# Patient Record
Sex: FEMALE | Race: WHITE | NOT HISPANIC OR LATINO
[De-identification: names, ages, dates, MRNs, and addresses within clinical notes are randomized per-mention and may not be internally consistent; named-entity substitution may affect disease eponyms.]

---

## 2017-01-10 ENCOUNTER — APPOINTMENT (OUTPATIENT)
Dept: OBGYN | Facility: CLINIC | Age: 37
End: 2017-01-10

## 2017-02-13 ENCOUNTER — RESULT REVIEW (OUTPATIENT)
Age: 37
End: 2017-02-13

## 2017-02-14 ENCOUNTER — APPOINTMENT (OUTPATIENT)
Dept: OBGYN | Facility: CLINIC | Age: 37
End: 2017-02-14

## 2017-05-31 ENCOUNTER — APPOINTMENT (OUTPATIENT)
Dept: OBGYN | Facility: CLINIC | Age: 37
End: 2017-05-31

## 2017-06-22 ENCOUNTER — APPOINTMENT (OUTPATIENT)
Dept: OBGYN | Facility: CLINIC | Age: 37
End: 2017-06-22

## 2017-07-10 ENCOUNTER — APPOINTMENT (OUTPATIENT)
Dept: OBGYN | Facility: CLINIC | Age: 37
End: 2017-07-10

## 2017-08-09 ENCOUNTER — APPOINTMENT (OUTPATIENT)
Dept: OBGYN | Facility: CLINIC | Age: 37
End: 2017-08-09
Payer: COMMERCIAL

## 2017-08-09 PROCEDURE — 36415 COLL VENOUS BLD VENIPUNCTURE: CPT

## 2017-08-09 PROCEDURE — 0502F SUBSEQUENT PRENATAL CARE: CPT

## 2017-08-31 ENCOUNTER — ASOB RESULT (OUTPATIENT)
Age: 37
End: 2017-08-31

## 2017-08-31 ENCOUNTER — APPOINTMENT (OUTPATIENT)
Dept: ANTEPARTUM | Facility: CLINIC | Age: 37
End: 2017-08-31
Payer: COMMERCIAL

## 2017-08-31 PROCEDURE — 76811 OB US DETAILED SNGL FETUS: CPT

## 2017-09-07 ENCOUNTER — APPOINTMENT (OUTPATIENT)
Dept: OBGYN | Facility: CLINIC | Age: 37
End: 2017-09-07

## 2017-09-11 ENCOUNTER — APPOINTMENT (OUTPATIENT)
Dept: OBGYN | Facility: CLINIC | Age: 37
End: 2017-09-11
Payer: COMMERCIAL

## 2017-09-11 PROCEDURE — 0502F SUBSEQUENT PRENATAL CARE: CPT

## 2017-10-03 ENCOUNTER — APPOINTMENT (OUTPATIENT)
Dept: OBGYN | Facility: CLINIC | Age: 37
End: 2017-10-03
Payer: COMMERCIAL

## 2017-10-03 PROCEDURE — 0502F SUBSEQUENT PRENATAL CARE: CPT

## 2017-10-25 ENCOUNTER — APPOINTMENT (OUTPATIENT)
Dept: OBGYN | Facility: CLINIC | Age: 37
End: 2017-10-25
Payer: COMMERCIAL

## 2017-10-25 PROCEDURE — 0502F SUBSEQUENT PRENATAL CARE: CPT

## 2017-10-25 PROCEDURE — 76817 TRANSVAGINAL US OBSTETRIC: CPT

## 2017-10-25 PROCEDURE — 36415 COLL VENOUS BLD VENIPUNCTURE: CPT

## 2017-10-25 PROCEDURE — 76816 OB US FOLLOW-UP PER FETUS: CPT

## 2017-10-30 ENCOUNTER — INPATIENT (INPATIENT)
Facility: HOSPITAL | Age: 37
LOS: 3 days | Discharge: HOME | DRG: 782 | End: 2017-11-03
Attending: OBSTETRICS & GYNECOLOGY | Admitting: OBSTETRICS & GYNECOLOGY
Payer: COMMERCIAL

## 2017-10-30 DIAGNOSIS — Z3A.00 WEEKS OF GESTATION OF PREGNANCY NOT SPECIFIED: ICD-10-CM

## 2017-10-30 DIAGNOSIS — O26.899 OTHER SPECIFIED PREGNANCY RELATED CONDITIONS, UNSPECIFIED TRIMESTER: ICD-10-CM

## 2017-10-30 DIAGNOSIS — Z34.80 ENCOUNTER FOR SUPERVISION OF OTHER NORMAL PREGNANCY, UNSPECIFIED TRIMESTER: ICD-10-CM

## 2017-10-31 ENCOUNTER — APPOINTMENT (OUTPATIENT)
Dept: ANTEPARTUM | Facility: CLINIC | Age: 37
End: 2017-10-31

## 2017-10-31 ENCOUNTER — ASOB RESULT (OUTPATIENT)
Age: 37
End: 2017-10-31

## 2017-10-31 VITALS — WEIGHT: 171.96 LBS | HEIGHT: 64.5 IN

## 2017-10-31 LAB
APPEARANCE UR: CLEAR — SIGNIFICANT CHANGE UP
APTT BLD: 27.5 SEC — SIGNIFICANT CHANGE UP (ref 27.5–37.4)
BACTERIA # UR AUTO: ABNORMAL /HPF
BASOPHILS # BLD AUTO: 0 K/UL — SIGNIFICANT CHANGE UP (ref 0–0.2)
BASOPHILS NFR BLD AUTO: 0.4 % — SIGNIFICANT CHANGE UP (ref 0–2)
BILIRUB UR-MCNC: NEGATIVE — SIGNIFICANT CHANGE UP
BLD GP AB SCN SERPL QL: NEGATIVE — SIGNIFICANT CHANGE UP
COLOR SPEC: SIGNIFICANT CHANGE UP
DIFF PNL FLD: ABNORMAL
EOSINOPHIL # BLD AUTO: 0 K/UL — SIGNIFICANT CHANGE UP (ref 0–0.5)
EOSINOPHIL NFR BLD AUTO: 0.4 % — SIGNIFICANT CHANGE UP (ref 0–6)
EPI CELLS # UR: SIGNIFICANT CHANGE UP /HPF
FIBRINOGEN PPP-MCNC: 466 MG/DL — SIGNIFICANT CHANGE UP (ref 310–510)
GLUCOSE UR QL: NEGATIVE — SIGNIFICANT CHANGE UP
HCT VFR BLD CALC: 33.9 % — LOW (ref 34.5–45)
HGB BLD-MCNC: 12.2 G/DL — SIGNIFICANT CHANGE UP (ref 11.5–15.5)
INR BLD: 0.93 RATIO — SIGNIFICANT CHANGE UP (ref 0.88–1.16)
KETONES UR-MCNC: NEGATIVE — SIGNIFICANT CHANGE UP
KLEIHAUER-BETKE CALCULATION: 0.1 % — SIGNIFICANT CHANGE UP (ref 0–0.3)
LEUKOCYTE ESTERASE UR-ACNC: NEGATIVE — SIGNIFICANT CHANGE UP
LYMPHOCYTES # BLD AUTO: 2.9 K/UL — SIGNIFICANT CHANGE UP (ref 1–3.3)
LYMPHOCYTES # BLD AUTO: 25.8 % — SIGNIFICANT CHANGE UP (ref 13–44)
MAGNESIUM SERPL-MCNC: 5.4 MG/DL — HIGH (ref 1.6–2.6)
MAGNESIUM SERPL-MCNC: 6 MG/DL — HIGH (ref 1.6–2.6)
MAGNESIUM SERPL-MCNC: 6.1 MG/DL — HIGH (ref 1.6–2.6)
MCHC RBC-ENTMCNC: 34.6 PG — HIGH (ref 27–34)
MCHC RBC-ENTMCNC: 36.1 GM/DL — HIGH (ref 32–36)
MCV RBC AUTO: 95.7 FL — SIGNIFICANT CHANGE UP (ref 80–100)
MONOCYTES # BLD AUTO: 0.9 K/UL — SIGNIFICANT CHANGE UP (ref 0–0.9)
MONOCYTES NFR BLD AUTO: 7.6 % — SIGNIFICANT CHANGE UP (ref 2–14)
NEUTROPHILS # BLD AUTO: 7.5 K/UL — HIGH (ref 1.8–7.4)
NEUTROPHILS NFR BLD AUTO: 65.9 % — SIGNIFICANT CHANGE UP (ref 43–77)
NITRITE UR-MCNC: NEGATIVE — SIGNIFICANT CHANGE UP
PH UR: 6.5 — SIGNIFICANT CHANGE UP (ref 5–8)
PLATELET # BLD AUTO: 197 K/UL — SIGNIFICANT CHANGE UP (ref 150–400)
PROT UR-MCNC: NEGATIVE — SIGNIFICANT CHANGE UP
PROTHROM AB SERPL-ACNC: 10.1 SEC — SIGNIFICANT CHANGE UP (ref 9.8–12.7)
RBC # BLD: 3.54 M/UL — LOW (ref 3.8–5.2)
RBC # FLD: 11.9 % — SIGNIFICANT CHANGE UP (ref 10.3–14.5)
RBC CASTS # UR COMP ASSIST: >50 /HPF (ref 0–2)
RH IG SCN BLD-IMP: POSITIVE — SIGNIFICANT CHANGE UP
SP GR SPEC: 1.01 — LOW (ref 1.01–1.02)
T PALLIDUM AB TITR SER: NEGATIVE — SIGNIFICANT CHANGE UP
UROBILINOGEN FLD QL: NEGATIVE — SIGNIFICANT CHANGE UP
WBC # BLD: 11.3 K/UL — HIGH (ref 3.8–10.5)
WBC # FLD AUTO: 11.3 K/UL — HIGH (ref 3.8–10.5)
WBC UR QL: SIGNIFICANT CHANGE UP /HPF (ref 0–5)

## 2017-10-31 PROCEDURE — 76816 OB US FOLLOW-UP PER FETUS: CPT | Mod: 26

## 2017-10-31 PROCEDURE — 76817 TRANSVAGINAL US OBSTETRIC: CPT | Mod: 26

## 2017-10-31 RX ORDER — AMPICILLIN TRIHYDRATE 250 MG
CAPSULE ORAL
Qty: 0 | Refills: 0 | Status: DISCONTINUED | OUTPATIENT
Start: 2017-10-31 | End: 2017-10-31

## 2017-10-31 RX ORDER — AMPICILLIN TRIHYDRATE 250 MG
2 CAPSULE ORAL ONCE
Qty: 0 | Refills: 0 | Status: COMPLETED | OUTPATIENT
Start: 2017-10-31 | End: 2017-10-31

## 2017-10-31 RX ORDER — MAGNESIUM SULFATE 500 MG/ML
4 VIAL (ML) INJECTION ONCE
Qty: 0 | Refills: 0 | Status: COMPLETED | OUTPATIENT
Start: 2017-10-30 | End: 2017-10-31

## 2017-10-31 RX ORDER — MAGNESIUM SULFATE 500 MG/ML
2.5 VIAL (ML) INJECTION
Qty: 40 | Refills: 0 | Status: DISCONTINUED | OUTPATIENT
Start: 2017-10-30 | End: 2017-11-01

## 2017-10-31 RX ORDER — INFLUENZA VIRUS VACCINE 15; 15; 15; 15 UG/.5ML; UG/.5ML; UG/.5ML; UG/.5ML
0.5 SUSPENSION INTRAMUSCULAR ONCE
Qty: 0 | Refills: 0 | Status: COMPLETED | OUTPATIENT
Start: 2017-10-31 | End: 2017-11-03

## 2017-10-31 RX ORDER — INDOMETHACIN 50 MG
50 CAPSULE ORAL ONCE
Qty: 0 | Refills: 0 | Status: COMPLETED | OUTPATIENT
Start: 2017-10-31 | End: 2017-10-31

## 2017-10-31 RX ORDER — SODIUM CHLORIDE 9 MG/ML
1000 INJECTION, SOLUTION INTRAVENOUS
Qty: 0 | Refills: 0 | Status: DISCONTINUED | OUTPATIENT
Start: 2017-10-30 | End: 2017-11-01

## 2017-10-31 RX ORDER — INDOMETHACIN 50 MG
25 CAPSULE ORAL EVERY 6 HOURS
Qty: 0 | Refills: 0 | Status: COMPLETED | OUTPATIENT
Start: 2017-10-31 | End: 2017-10-31

## 2017-10-31 RX ORDER — INDOMETHACIN 50 MG
25 CAPSULE ORAL EVERY 6 HOURS
Qty: 0 | Refills: 0 | Status: COMPLETED | OUTPATIENT
Start: 2017-10-31 | End: 2017-11-01

## 2017-10-31 RX ORDER — AMPICILLIN TRIHYDRATE 250 MG
1 CAPSULE ORAL EVERY 4 HOURS
Qty: 0 | Refills: 0 | Status: DISCONTINUED | OUTPATIENT
Start: 2017-10-31 | End: 2017-10-31

## 2017-10-31 RX ADMIN — Medication 108 GRAM(S): at 10:51

## 2017-10-31 RX ADMIN — Medication 25 MILLIGRAM(S): at 09:05

## 2017-10-31 RX ADMIN — SODIUM CHLORIDE 125 MILLILITER(S): 9 INJECTION, SOLUTION INTRAVENOUS at 06:33

## 2017-10-31 RX ADMIN — Medication 300 GRAM(S): at 00:25

## 2017-10-31 RX ADMIN — Medication 25 MILLIGRAM(S): at 14:58

## 2017-10-31 RX ADMIN — Medication 50 MILLIGRAM(S): at 03:06

## 2017-10-31 RX ADMIN — Medication 12 MILLIGRAM(S): at 00:18

## 2017-10-31 RX ADMIN — Medication 108 GRAM(S): at 06:37

## 2017-10-31 RX ADMIN — Medication 50 GM/HR: at 00:52

## 2017-10-31 RX ADMIN — Medication 25 MILLIGRAM(S): at 21:11

## 2017-10-31 RX ADMIN — Medication 116 GRAM(S): at 02:16

## 2017-10-31 RX ADMIN — Medication 108 GRAM(S): at 14:42

## 2017-11-01 LAB
GROUP B BETA STREP DNA (PCR): SIGNIFICANT CHANGE UP
GROUP B BETA STREP INTERPRETATION: SIGNIFICANT CHANGE UP
SOURCE GROUP B STREP: SIGNIFICANT CHANGE UP

## 2017-11-01 PROCEDURE — 99232 SBSQ HOSP IP/OBS MODERATE 35: CPT

## 2017-11-01 PROCEDURE — 59025 FETAL NON-STRESS TEST: CPT | Mod: 26

## 2017-11-01 RX ADMIN — Medication 25 MILLIGRAM(S): at 15:18

## 2017-11-01 RX ADMIN — Medication 25 MILLIGRAM(S): at 03:09

## 2017-11-01 RX ADMIN — Medication 1 TABLET(S): at 13:01

## 2017-11-01 RX ADMIN — Medication 12 MILLIGRAM(S): at 00:05

## 2017-11-01 RX ADMIN — Medication 25 MILLIGRAM(S): at 09:08

## 2017-11-02 ENCOUNTER — APPOINTMENT (OUTPATIENT)
Dept: OBGYN | Facility: CLINIC | Age: 37
End: 2017-11-02

## 2017-11-02 PROCEDURE — 59025 FETAL NON-STRESS TEST: CPT | Mod: 26

## 2017-11-02 RX ADMIN — Medication 1 TABLET(S): at 12:09

## 2017-11-03 ENCOUNTER — TRANSCRIPTION ENCOUNTER (OUTPATIENT)
Age: 37
End: 2017-11-03

## 2017-11-03 VITALS
HEART RATE: 82 BPM | SYSTOLIC BLOOD PRESSURE: 95 MMHG | TEMPERATURE: 98 F | RESPIRATION RATE: 18 BRPM | DIASTOLIC BLOOD PRESSURE: 58 MMHG

## 2017-11-03 PROCEDURE — 59025 FETAL NON-STRESS TEST: CPT

## 2017-11-03 PROCEDURE — 90686 IIV4 VACC NO PRSV 0.5 ML IM: CPT

## 2017-11-03 PROCEDURE — 85384 FIBRINOGEN ACTIVITY: CPT

## 2017-11-03 PROCEDURE — 85460 HEMOGLOBIN FETAL: CPT

## 2017-11-03 PROCEDURE — 86780 TREPONEMA PALLIDUM: CPT

## 2017-11-03 PROCEDURE — 85610 PROTHROMBIN TIME: CPT

## 2017-11-03 PROCEDURE — 85027 COMPLETE CBC AUTOMATED: CPT

## 2017-11-03 PROCEDURE — 86901 BLOOD TYPING SEROLOGIC RH(D): CPT

## 2017-11-03 PROCEDURE — G0463: CPT

## 2017-11-03 PROCEDURE — 85730 THROMBOPLASTIN TIME PARTIAL: CPT

## 2017-11-03 PROCEDURE — 86850 RBC ANTIBODY SCREEN: CPT

## 2017-11-03 PROCEDURE — 87653 STREP B DNA AMP PROBE: CPT

## 2017-11-03 PROCEDURE — 59050 FETAL MONITOR W/REPORT: CPT

## 2017-11-03 PROCEDURE — 83735 ASSAY OF MAGNESIUM: CPT

## 2017-11-03 PROCEDURE — 86900 BLOOD TYPING SEROLOGIC ABO: CPT

## 2017-11-03 PROCEDURE — 81001 URINALYSIS AUTO W/SCOPE: CPT

## 2017-11-03 RX ADMIN — Medication 1 TABLET(S): at 11:24

## 2017-11-03 RX ADMIN — INFLUENZA VIRUS VACCINE 0.5 MILLILITER(S): 15; 15; 15; 15 SUSPENSION INTRAMUSCULAR at 11:21

## 2017-11-03 NOTE — DISCHARGE NOTE ANTEPARTUM - MEDICATION SUMMARY - MEDICATIONS TO STOP TAKING
I will STOP taking the medications listed below when I get home from the hospital:    ibuprofen 600 mg oral tablet  -- 1 tab(s) by mouth every 6 hours, As needed, Mild-to-moderate pain or cramping

## 2017-11-03 NOTE — DISCHARGE NOTE ANTEPARTUM - PATIENT PORTAL LINK FT
“You can access the [employer] Patient Portal, offered by [facility], by registering with the following website: [email or web address]”

## 2017-11-03 NOTE — DISCHARGE NOTE ANTEPARTUM - CARE PLAN
Principal Discharge DX:	Vaginal bleeding in pregnancy, third trimester  Goal:	continue pregnancy  Instructions for follow-up, activity and diet:	no lifting or exercise, nothing in the vagina, do not return to work, reduced activity  Secondary Diagnosis:	Low lying placenta with hemorrhage, third trimester

## 2017-11-03 NOTE — DISCHARGE NOTE ANTEPARTUM - CARE PROVIDER_API CALL
Brandy Marquez (MD), OBSGYN  [address] [address]  Phone: [phone]  Fax: [phone]

## 2017-11-03 NOTE — DISCHARGE NOTE ANTEPARTUM - HOSPITAL COURSE
35yo P1 admitted @ 28+ weeks with vaginal bleeding, has known low lying placenta, pt found to be unique and treated with magnesium sulfate and indocin tocolysis, received betamethasone course.  Bleeding subsided and patient discharged home on modified bed rest

## 2017-11-03 NOTE — DISCHARGE NOTE ANTEPARTUM - MEDICATION SUMMARY - MEDICATIONS TO TAKE
I will START or STAY ON the medications listed below when I get home from the hospital:    Prenatal Multivitamins with Folic Acid 1 mg oral tablet  -- 1 tab(s) by mouth once a day  -- Indication: For nutrition    docusate sodium 100 mg oral capsule  -- 1 cap(s) by mouth 2 times a day, As needed, Stool Softening  -- Indication: For Stool softener

## 2017-11-03 NOTE — PROVIDER CONTACT NOTE (OTHER) - ASSESSMENT
Pt denies cramping ctx or feeling faint, verbalizes positive fetal movement, abdomen soft on palpation.  Spotting noted on underwear, Patient placed on NST, Vitals temp 98.2, HR 82, BP 95/58 Resp 18, No apparent signs of distress noted

## 2017-11-03 NOTE — DISCHARGE NOTE ANTEPARTUM - PLAN OF CARE
continue pregnancy no lifting or exercise, nothing in the vagina, do not return to work, reduced activity

## 2017-11-06 ENCOUNTER — APPOINTMENT (OUTPATIENT)
Dept: OBGYN | Facility: CLINIC | Age: 37
End: 2017-11-06
Payer: COMMERCIAL

## 2017-11-06 PROCEDURE — 0502F SUBSEQUENT PRENATAL CARE: CPT

## 2017-11-08 ENCOUNTER — APPOINTMENT (OUTPATIENT)
Dept: OBGYN | Facility: CLINIC | Age: 37
End: 2017-11-08

## 2017-11-14 ENCOUNTER — APPOINTMENT (OUTPATIENT)
Dept: OBGYN | Facility: CLINIC | Age: 37
End: 2017-11-14
Payer: COMMERCIAL

## 2017-11-14 PROCEDURE — 76815 OB US LIMITED FETUS(S): CPT

## 2017-11-14 PROCEDURE — 76817 TRANSVAGINAL US OBSTETRIC: CPT

## 2017-11-21 ENCOUNTER — APPOINTMENT (OUTPATIENT)
Dept: OBGYN | Facility: CLINIC | Age: 37
End: 2017-11-21
Payer: COMMERCIAL

## 2017-11-21 PROCEDURE — 0502F SUBSEQUENT PRENATAL CARE: CPT

## 2017-11-30 ENCOUNTER — APPOINTMENT (OUTPATIENT)
Dept: OBGYN | Facility: CLINIC | Age: 37
End: 2017-11-30
Payer: COMMERCIAL

## 2017-11-30 PROCEDURE — 0502F SUBSEQUENT PRENATAL CARE: CPT

## 2017-12-04 ENCOUNTER — APPOINTMENT (OUTPATIENT)
Dept: OBGYN | Facility: CLINIC | Age: 37
End: 2017-12-04
Payer: COMMERCIAL

## 2017-12-04 ENCOUNTER — OUTPATIENT (OUTPATIENT)
Dept: OUTPATIENT SERVICES | Facility: HOSPITAL | Age: 37
LOS: 1 days | End: 2017-12-04
Payer: COMMERCIAL

## 2017-12-04 DIAGNOSIS — Z3A.00 WEEKS OF GESTATION OF PREGNANCY NOT SPECIFIED: ICD-10-CM

## 2017-12-04 DIAGNOSIS — O26.899 OTHER SPECIFIED PREGNANCY RELATED CONDITIONS, UNSPECIFIED TRIMESTER: ICD-10-CM

## 2017-12-04 PROCEDURE — 59025 FETAL NON-STRESS TEST: CPT

## 2017-12-04 PROCEDURE — 76819 FETAL BIOPHYS PROFIL W/O NST: CPT

## 2017-12-04 PROCEDURE — 0502F SUBSEQUENT PRENATAL CARE: CPT

## 2017-12-04 PROCEDURE — 76816 OB US FOLLOW-UP PER FETUS: CPT

## 2017-12-04 PROCEDURE — G0463: CPT

## 2017-12-14 ENCOUNTER — APPOINTMENT (OUTPATIENT)
Dept: OBGYN | Facility: CLINIC | Age: 37
End: 2017-12-14
Payer: COMMERCIAL

## 2017-12-14 PROCEDURE — 76817 TRANSVAGINAL US OBSTETRIC: CPT

## 2017-12-14 PROCEDURE — 76818 FETAL BIOPHYS PROFILE W/NST: CPT

## 2017-12-14 PROCEDURE — 0502F SUBSEQUENT PRENATAL CARE: CPT

## 2017-12-18 ENCOUNTER — APPOINTMENT (OUTPATIENT)
Dept: OBGYN | Facility: CLINIC | Age: 37
End: 2017-12-18
Payer: COMMERCIAL

## 2017-12-18 PROCEDURE — 0502F SUBSEQUENT PRENATAL CARE: CPT

## 2017-12-18 PROCEDURE — 76819 FETAL BIOPHYS PROFIL W/O NST: CPT

## 2017-12-26 ENCOUNTER — APPOINTMENT (OUTPATIENT)
Dept: OBGYN | Facility: CLINIC | Age: 37
End: 2017-12-26
Payer: COMMERCIAL

## 2017-12-26 PROCEDURE — 0502F SUBSEQUENT PRENATAL CARE: CPT

## 2018-01-03 ENCOUNTER — APPOINTMENT (OUTPATIENT)
Dept: OBGYN | Facility: CLINIC | Age: 38
End: 2018-01-03
Payer: COMMERCIAL

## 2018-01-03 PROCEDURE — 0502F SUBSEQUENT PRENATAL CARE: CPT

## 2018-01-03 PROCEDURE — 59025 FETAL NON-STRESS TEST: CPT

## 2018-01-08 ENCOUNTER — ASOB RESULT (OUTPATIENT)
Age: 38
End: 2018-01-08

## 2018-01-08 ENCOUNTER — APPOINTMENT (OUTPATIENT)
Dept: ANTEPARTUM | Facility: CLINIC | Age: 38
End: 2018-01-08
Payer: COMMERCIAL

## 2018-01-08 ENCOUNTER — OUTPATIENT (OUTPATIENT)
Dept: OUTPATIENT SERVICES | Facility: HOSPITAL | Age: 38
LOS: 1 days | End: 2018-01-08
Payer: COMMERCIAL

## 2018-01-08 DIAGNOSIS — Z01.818 ENCOUNTER FOR OTHER PREPROCEDURAL EXAMINATION: ICD-10-CM

## 2018-01-08 PROCEDURE — 59412 ANTEPARTUM MANIPULATION: CPT

## 2018-01-08 PROCEDURE — 76805 OB US >/= 14 WKS SNGL FETUS: CPT

## 2018-01-08 PROCEDURE — 76818 FETAL BIOPHYS PROFILE W/NST: CPT

## 2018-01-08 PROCEDURE — 76818 FETAL BIOPHYS PROFILE W/NST: CPT | Mod: 26

## 2018-01-08 PROCEDURE — 76805 OB US >/= 14 WKS SNGL FETUS: CPT | Mod: 26

## 2018-01-09 ENCOUNTER — RESULT REVIEW (OUTPATIENT)
Age: 38
End: 2018-01-09

## 2018-01-09 ENCOUNTER — INPATIENT (INPATIENT)
Facility: HOSPITAL | Age: 38
LOS: 2 days | Discharge: HOME | End: 2018-01-12
Attending: OBSTETRICS & GYNECOLOGY | Admitting: OBSTETRICS & GYNECOLOGY
Payer: COMMERCIAL

## 2018-01-09 VITALS — HEIGHT: 64 IN | WEIGHT: 178.57 LBS

## 2018-01-09 DIAGNOSIS — O44.10 COMPLETE PLACENTA PREVIA WITH HEMORRHAGE, UNSPECIFIED TRIMESTER: ICD-10-CM

## 2018-01-09 DIAGNOSIS — O46.93 ANTEPARTUM HEMORRHAGE, UNSPECIFIED, THIRD TRIMESTER: ICD-10-CM

## 2018-01-09 DIAGNOSIS — O44.1: ICD-10-CM

## 2018-01-09 LAB
BASOPHILS # BLD AUTO: 0.1 K/UL — SIGNIFICANT CHANGE UP (ref 0–0.2)
BASOPHILS NFR BLD AUTO: 0.8 % — SIGNIFICANT CHANGE UP (ref 0–2)
BLD GP AB SCN SERPL QL: NEGATIVE — SIGNIFICANT CHANGE UP
EOSINOPHIL # BLD AUTO: 0 K/UL — SIGNIFICANT CHANGE UP (ref 0–0.5)
EOSINOPHIL NFR BLD AUTO: 0.4 % — SIGNIFICANT CHANGE UP (ref 0–6)
HCT VFR BLD CALC: 35.6 % — SIGNIFICANT CHANGE UP (ref 34.5–45)
HGB BLD-MCNC: 12.2 G/DL — SIGNIFICANT CHANGE UP (ref 11.5–15.5)
LYMPHOCYTES # BLD AUTO: 1.8 K/UL — SIGNIFICANT CHANGE UP (ref 1–3.3)
LYMPHOCYTES # BLD AUTO: 21.5 % — SIGNIFICANT CHANGE UP (ref 13–44)
MCHC RBC-ENTMCNC: 32.9 PG — SIGNIFICANT CHANGE UP (ref 27–34)
MCHC RBC-ENTMCNC: 34.2 GM/DL — SIGNIFICANT CHANGE UP (ref 32–36)
MCV RBC AUTO: 96.1 FL — SIGNIFICANT CHANGE UP (ref 80–100)
MONOCYTES # BLD AUTO: 0.7 K/UL — SIGNIFICANT CHANGE UP (ref 0–0.9)
MONOCYTES NFR BLD AUTO: 8.2 % — SIGNIFICANT CHANGE UP (ref 2–14)
NEUTROPHILS # BLD AUTO: 5.7 K/UL — SIGNIFICANT CHANGE UP (ref 1.8–7.4)
NEUTROPHILS NFR BLD AUTO: 69.1 % — SIGNIFICANT CHANGE UP (ref 43–77)
PLATELET # BLD AUTO: 164 K/UL — SIGNIFICANT CHANGE UP (ref 150–400)
RBC # BLD: 3.71 M/UL — LOW (ref 3.8–5.2)
RBC # FLD: 11.7 % — SIGNIFICANT CHANGE UP (ref 10.3–14.5)
RH IG SCN BLD-IMP: POSITIVE — SIGNIFICANT CHANGE UP
T PALLIDUM AB TITR SER: NEGATIVE — SIGNIFICANT CHANGE UP
WBC # BLD: 8.2 K/UL — SIGNIFICANT CHANGE UP (ref 3.8–10.5)
WBC # FLD AUTO: 8.2 K/UL — SIGNIFICANT CHANGE UP (ref 3.8–10.5)

## 2018-01-09 PROCEDURE — 59510 CESAREAN DELIVERY: CPT

## 2018-01-09 RX ORDER — SODIUM CHLORIDE 9 MG/ML
1000 INJECTION, SOLUTION INTRAVENOUS ONCE
Qty: 0 | Refills: 0 | Status: COMPLETED | OUTPATIENT
Start: 2018-01-09 | End: 2018-01-09

## 2018-01-09 RX ORDER — NALOXONE HYDROCHLORIDE 4 MG/.1ML
0.1 SPRAY NASAL
Qty: 0 | Refills: 0 | Status: DISCONTINUED | OUTPATIENT
Start: 2018-01-09 | End: 2018-01-11

## 2018-01-09 RX ORDER — IBUPROFEN 200 MG
600 TABLET ORAL EVERY 6 HOURS
Qty: 0 | Refills: 0 | Status: COMPLETED | OUTPATIENT
Start: 2018-01-09 | End: 2018-12-08

## 2018-01-09 RX ORDER — METOCLOPRAMIDE HCL 10 MG
10 TABLET ORAL ONCE
Qty: 0 | Refills: 0 | Status: COMPLETED | OUTPATIENT
Start: 2018-01-09 | End: 2018-01-10

## 2018-01-09 RX ORDER — ACETAMINOPHEN 500 MG
975 TABLET ORAL EVERY 6 HOURS
Qty: 0 | Refills: 0 | Status: DISCONTINUED | OUTPATIENT
Start: 2018-01-09 | End: 2018-01-12

## 2018-01-09 RX ORDER — SODIUM CHLORIDE 9 MG/ML
1000 INJECTION, SOLUTION INTRAVENOUS
Qty: 0 | Refills: 0 | Status: DISCONTINUED | OUTPATIENT
Start: 2018-01-09 | End: 2018-01-12

## 2018-01-09 RX ORDER — HEPARIN SODIUM 5000 [USP'U]/ML
5000 INJECTION INTRAVENOUS; SUBCUTANEOUS EVERY 12 HOURS
Qty: 0 | Refills: 0 | Status: DISCONTINUED | OUTPATIENT
Start: 2018-01-09 | End: 2018-01-12

## 2018-01-09 RX ORDER — SODIUM CHLORIDE 9 MG/ML
1000 INJECTION, SOLUTION INTRAVENOUS
Qty: 0 | Refills: 0 | Status: DISCONTINUED | OUTPATIENT
Start: 2018-01-09 | End: 2018-01-09

## 2018-01-09 RX ORDER — LANOLIN
1 OINTMENT (GRAM) TOPICAL
Qty: 0 | Refills: 0 | Status: DISCONTINUED | OUTPATIENT
Start: 2018-01-09 | End: 2018-01-12

## 2018-01-09 RX ORDER — FAMOTIDINE 10 MG/ML
20 INJECTION INTRAVENOUS ONCE
Qty: 0 | Refills: 0 | Status: COMPLETED | OUTPATIENT
Start: 2018-01-09 | End: 2018-01-09

## 2018-01-09 RX ORDER — OXYTOCIN 10 UNIT/ML
41.67 VIAL (ML) INJECTION
Qty: 20 | Refills: 0 | Status: DISCONTINUED | OUTPATIENT
Start: 2018-01-09 | End: 2018-01-09

## 2018-01-09 RX ORDER — FERROUS SULFATE 325(65) MG
325 TABLET ORAL DAILY
Qty: 0 | Refills: 0 | Status: DISCONTINUED | OUTPATIENT
Start: 2018-01-09 | End: 2018-01-10

## 2018-01-09 RX ORDER — OXYTOCIN 10 UNIT/ML
41.67 VIAL (ML) INJECTION
Qty: 20 | Refills: 0 | Status: DISCONTINUED | OUTPATIENT
Start: 2018-01-09 | End: 2018-01-12

## 2018-01-09 RX ORDER — OXYCODONE HYDROCHLORIDE 5 MG/1
5 TABLET ORAL
Qty: 0 | Refills: 0 | Status: COMPLETED | OUTPATIENT
Start: 2018-01-09 | End: 2018-01-16

## 2018-01-09 RX ORDER — TETANUS TOXOID, REDUCED DIPHTHERIA TOXOID AND ACELLULAR PERTUSSIS VACCINE, ADSORBED 5; 2.5; 8; 8; 2.5 [IU]/.5ML; [IU]/.5ML; UG/.5ML; UG/.5ML; UG/.5ML
0.5 SUSPENSION INTRAMUSCULAR ONCE
Qty: 0 | Refills: 0 | Status: DISCONTINUED | OUTPATIENT
Start: 2018-01-09 | End: 2018-01-12

## 2018-01-09 RX ORDER — HYDROMORPHONE HYDROCHLORIDE 2 MG/ML
30 INJECTION INTRAMUSCULAR; INTRAVENOUS; SUBCUTANEOUS
Qty: 0 | Refills: 0 | Status: DISCONTINUED | OUTPATIENT
Start: 2018-01-09 | End: 2018-01-11

## 2018-01-09 RX ORDER — OXYCODONE HYDROCHLORIDE 5 MG/1
5 TABLET ORAL EVERY 4 HOURS
Qty: 0 | Refills: 0 | Status: COMPLETED | OUTPATIENT
Start: 2018-01-09 | End: 2018-01-16

## 2018-01-09 RX ORDER — DOCUSATE SODIUM 100 MG
100 CAPSULE ORAL
Qty: 0 | Refills: 0 | Status: DISCONTINUED | OUTPATIENT
Start: 2018-01-09 | End: 2018-01-12

## 2018-01-09 RX ORDER — ONDANSETRON 8 MG/1
4 TABLET, FILM COATED ORAL EVERY 6 HOURS
Qty: 0 | Refills: 0 | Status: DISCONTINUED | OUTPATIENT
Start: 2018-01-09 | End: 2018-01-11

## 2018-01-09 RX ORDER — DIPHENHYDRAMINE HCL 50 MG
25 CAPSULE ORAL EVERY 6 HOURS
Qty: 0 | Refills: 0 | Status: DISCONTINUED | OUTPATIENT
Start: 2018-01-09 | End: 2018-01-12

## 2018-01-09 RX ORDER — GLYCERIN ADULT
1 SUPPOSITORY, RECTAL RECTAL AT BEDTIME
Qty: 0 | Refills: 0 | Status: DISCONTINUED | OUTPATIENT
Start: 2018-01-09 | End: 2018-01-12

## 2018-01-09 RX ORDER — KETOROLAC TROMETHAMINE 30 MG/ML
30 SYRINGE (ML) INJECTION EVERY 6 HOURS
Qty: 0 | Refills: 0 | Status: DISCONTINUED | OUTPATIENT
Start: 2018-01-09 | End: 2018-01-12

## 2018-01-09 RX ORDER — CITRIC ACID/SODIUM CITRATE 300-500 MG
30 SOLUTION, ORAL ORAL ONCE
Qty: 0 | Refills: 0 | Status: COMPLETED | OUTPATIENT
Start: 2018-01-09 | End: 2018-01-09

## 2018-01-09 RX ORDER — SIMETHICONE 80 MG/1
80 TABLET, CHEWABLE ORAL EVERY 4 HOURS
Qty: 0 | Refills: 0 | Status: DISCONTINUED | OUTPATIENT
Start: 2018-01-09 | End: 2018-01-12

## 2018-01-09 RX ORDER — OXYTOCIN 10 UNIT/ML
333.33 VIAL (ML) INJECTION
Qty: 20 | Refills: 0 | Status: DISCONTINUED | OUTPATIENT
Start: 2018-01-09 | End: 2018-01-12

## 2018-01-09 RX ADMIN — Medication 125 MILLIUNIT(S)/MIN: at 15:23

## 2018-01-09 RX ADMIN — Medication 0.2 MILLIGRAM(S): at 14:10

## 2018-01-09 RX ADMIN — Medication 30 MILLIGRAM(S): at 14:15

## 2018-01-09 RX ADMIN — SODIUM CHLORIDE 125 MILLILITER(S): 9 INJECTION, SOLUTION INTRAVENOUS at 18:14

## 2018-01-09 RX ADMIN — SODIUM CHLORIDE 125 MILLILITER(S): 9 INJECTION, SOLUTION INTRAVENOUS at 13:21

## 2018-01-09 RX ADMIN — Medication 1000 MILLIUNIT(S)/MIN: at 15:23

## 2018-01-09 RX ADMIN — SODIUM CHLORIDE 2000 MILLILITER(S): 9 INJECTION, SOLUTION INTRAVENOUS at 12:00

## 2018-01-09 RX ADMIN — HYDROMORPHONE HYDROCHLORIDE 30 MILLILITER(S): 2 INJECTION INTRAMUSCULAR; INTRAVENOUS; SUBCUTANEOUS at 18:52

## 2018-01-09 RX ADMIN — Medication 30 MILLIGRAM(S): at 21:05

## 2018-01-09 RX ADMIN — FAMOTIDINE 20 MILLIGRAM(S): 10 INJECTION INTRAVENOUS at 12:20

## 2018-01-09 RX ADMIN — Medication 30 MILLILITER(S): at 12:20

## 2018-01-09 RX ADMIN — HYDROMORPHONE HYDROCHLORIDE 30 MILLILITER(S): 2 INJECTION INTRAMUSCULAR; INTRAVENOUS; SUBCUTANEOUS at 16:15

## 2018-01-09 RX ADMIN — Medication 30 MILLIGRAM(S): at 20:34

## 2018-01-10 ENCOUNTER — TRANSCRIPTION ENCOUNTER (OUTPATIENT)
Age: 38
End: 2018-01-10

## 2018-01-10 ENCOUNTER — APPOINTMENT (OUTPATIENT)
Dept: OBGYN | Facility: CLINIC | Age: 38
End: 2018-01-10

## 2018-01-10 LAB
BASOPHILS # BLD AUTO: 0.01 K/UL — SIGNIFICANT CHANGE UP (ref 0–0.2)
BASOPHILS NFR BLD AUTO: 0.1 % — SIGNIFICANT CHANGE UP (ref 0–2)
EOSINOPHIL # BLD AUTO: 0.01 K/UL — SIGNIFICANT CHANGE UP (ref 0–0.5)
EOSINOPHIL NFR BLD AUTO: 0.1 % — SIGNIFICANT CHANGE UP (ref 0–6)
HCT VFR BLD CALC: 26.4 % — LOW (ref 34.5–45)
HGB BLD-MCNC: 8.6 G/DL — LOW (ref 11.5–15.5)
IMM GRANULOCYTES NFR BLD AUTO: 0.2 % — SIGNIFICANT CHANGE UP (ref 0–1.5)
LYMPHOCYTES # BLD AUTO: 1.73 K/UL — SIGNIFICANT CHANGE UP (ref 1–3.3)
LYMPHOCYTES # BLD AUTO: 18 % — SIGNIFICANT CHANGE UP (ref 13–44)
MCHC RBC-ENTMCNC: 31.3 PG — SIGNIFICANT CHANGE UP (ref 27–34)
MCHC RBC-ENTMCNC: 32.6 GM/DL — SIGNIFICANT CHANGE UP (ref 32–36)
MCV RBC AUTO: 96 FL — SIGNIFICANT CHANGE UP (ref 80–100)
MONOCYTES # BLD AUTO: 0.99 K/UL — HIGH (ref 0–0.9)
MONOCYTES NFR BLD AUTO: 10.3 % — SIGNIFICANT CHANGE UP (ref 2–14)
NEUTROPHILS # BLD AUTO: 6.84 K/UL — SIGNIFICANT CHANGE UP (ref 1.8–7.4)
NEUTROPHILS NFR BLD AUTO: 71.3 % — SIGNIFICANT CHANGE UP (ref 43–77)
PLATELET # BLD AUTO: 142 K/UL — LOW (ref 150–400)
RBC # BLD: 2.75 M/UL — LOW (ref 3.8–5.2)
RBC # FLD: 13.5 % — SIGNIFICANT CHANGE UP (ref 10.3–14.5)
WBC # BLD: 9.6 K/UL — SIGNIFICANT CHANGE UP (ref 3.8–10.5)
WBC # FLD AUTO: 9.6 K/UL — SIGNIFICANT CHANGE UP (ref 3.8–10.5)

## 2018-01-10 RX ORDER — FERROUS SULFATE 325(65) MG
325 TABLET ORAL
Qty: 0 | Refills: 0 | Status: DISCONTINUED | OUTPATIENT
Start: 2018-01-10 | End: 2018-01-12

## 2018-01-10 RX ORDER — ASCORBIC ACID 60 MG
250 TABLET,CHEWABLE ORAL THREE TIMES A DAY
Qty: 0 | Refills: 0 | Status: DISCONTINUED | OUTPATIENT
Start: 2018-01-10 | End: 2018-01-12

## 2018-01-10 RX ADMIN — HEPARIN SODIUM 5000 UNIT(S): 5000 INJECTION INTRAVENOUS; SUBCUTANEOUS at 06:30

## 2018-01-10 RX ADMIN — Medication 30 MILLIGRAM(S): at 02:25

## 2018-01-10 RX ADMIN — ONDANSETRON 4 MILLIGRAM(S): 8 TABLET, FILM COATED ORAL at 08:22

## 2018-01-10 RX ADMIN — Medication 975 MILLIGRAM(S): at 18:26

## 2018-01-10 RX ADMIN — HEPARIN SODIUM 5000 UNIT(S): 5000 INJECTION INTRAVENOUS; SUBCUTANEOUS at 17:56

## 2018-01-10 RX ADMIN — HYDROMORPHONE HYDROCHLORIDE 30 MILLILITER(S): 2 INJECTION INTRAMUSCULAR; INTRAVENOUS; SUBCUTANEOUS at 16:46

## 2018-01-10 RX ADMIN — Medication 30 MILLIGRAM(S): at 16:16

## 2018-01-10 RX ADMIN — Medication 30 MILLIGRAM(S): at 23:00

## 2018-01-10 RX ADMIN — Medication 30 MILLIGRAM(S): at 08:50

## 2018-01-10 RX ADMIN — Medication 250 MILLIGRAM(S): at 22:32

## 2018-01-10 RX ADMIN — Medication 30 MILLIGRAM(S): at 16:30

## 2018-01-10 RX ADMIN — Medication 30 MILLIGRAM(S): at 01:55

## 2018-01-10 RX ADMIN — Medication 10 MILLIGRAM(S): at 10:30

## 2018-01-10 RX ADMIN — Medication 975 MILLIGRAM(S): at 11:53

## 2018-01-10 RX ADMIN — Medication 975 MILLIGRAM(S): at 12:30

## 2018-01-10 RX ADMIN — Medication 30 MILLIGRAM(S): at 22:32

## 2018-01-10 RX ADMIN — Medication 325 MILLIGRAM(S): at 18:26

## 2018-01-10 RX ADMIN — Medication 30 MILLIGRAM(S): at 08:33

## 2018-01-10 RX ADMIN — Medication 975 MILLIGRAM(S): at 17:57

## 2018-01-10 NOTE — CHART NOTE - NSCHARTNOTEFT_GEN_A_CORE
ALEXIS AVILES      POD#1     Vital Signs Last 24 Hrs  T(C): 36.5 (10 Fernando 2018 13:00), Max: 36.9 (2018 17:40)  T(F): 97.7 (10 Fernando 2018 13:00), Max: 98.4 (2018 17:40)  HR: 88 (10 Fernando 2018 13:00) (78 - 98)  BP: 98/67 (10 Fernando 2018 13:00) (94/60 - 127/62)  BP(mean): 70 (2018 17:40) (70 - 77)  RR: 18 (10 Fernando 2018 13:00) (16 - 43)  SpO2: 100% (10 Fernando 2018 13:00) (95% - 100%)                          8.6    9.60  )-----------( 142      ( 10 Fernando 2018 09:25 )             26.4           Plan:  - Ferrous Sulfate, Colace, Vitamin C supplementation.  - Repeat CBC POD #3  - Monitor for signs/symptoms of anemia.   Marcella Sahni PA-C ALEXIS AVILES      POD#1     Denies complaints, denies dizziness, SOB, CP or palpitation, feels well    Vital Signs Last 24 Hrs  T(C): 36.5 (10 Fernando 2018 13:00), Max: 36.9 (2018 17:40)  T(F): 97.7 (10 Fernando 2018 13:00), Max: 98.4 (2018 17:40)  HR: 88 (10 Fernando 2018 13:00) (78 - 98)  BP: 98/67 (10 Fernando 2018 13:00) (94/60 - 127/62)  BP(mean): 70 (2018 17:40) (70 - 77)  RR: 18 (10 Fernando 2018 13:00) (16 - 43)  SpO2: 100% (10 Fernando 2018 13:00) (95% - 100%)                          8.6    9.60  )-----------( 142      ( 10 Fernando 2018 09:25 )             26.4           Plan:  - Ferrous Sulfate, Colace, Vitamin C supplementation.  - Repeat CBC POD #3  - Monitor for signs/symptoms of anemia.   Marcella Sahni PA-C

## 2018-01-11 LAB
HCT VFR BLD CALC: 28.7 % — LOW (ref 34.5–45)
HGB BLD-MCNC: 9.5 G/DL — LOW (ref 11.5–15.5)
MCHC RBC-ENTMCNC: 31.9 PG — SIGNIFICANT CHANGE UP (ref 27–34)
MCHC RBC-ENTMCNC: 33.1 GM/DL — SIGNIFICANT CHANGE UP (ref 32–36)
MCV RBC AUTO: 96.3 FL — SIGNIFICANT CHANGE UP (ref 80–100)
PLATELET # BLD AUTO: 184 K/UL — SIGNIFICANT CHANGE UP (ref 150–400)
RBC # BLD: 2.98 M/UL — LOW (ref 3.8–5.2)
RBC # FLD: 13.5 % — SIGNIFICANT CHANGE UP (ref 10.3–14.5)
WBC # BLD: 12.18 K/UL — HIGH (ref 3.8–10.5)
WBC # FLD AUTO: 12.18 K/UL — HIGH (ref 3.8–10.5)

## 2018-01-11 RX ORDER — OXYCODONE HYDROCHLORIDE 5 MG/1
5 TABLET ORAL
Qty: 0 | Refills: 0 | Status: DISCONTINUED | OUTPATIENT
Start: 2018-01-11 | End: 2018-01-12

## 2018-01-11 RX ORDER — OXYCODONE HYDROCHLORIDE 5 MG/1
5 TABLET ORAL EVERY 4 HOURS
Qty: 0 | Refills: 0 | Status: DISCONTINUED | OUTPATIENT
Start: 2018-01-11 | End: 2018-01-12

## 2018-01-11 RX ORDER — IBUPROFEN 200 MG
600 TABLET ORAL EVERY 6 HOURS
Qty: 0 | Refills: 0 | Status: DISCONTINUED | OUTPATIENT
Start: 2018-01-11 | End: 2018-01-12

## 2018-01-11 RX ADMIN — Medication 100 MILLIGRAM(S): at 05:37

## 2018-01-11 RX ADMIN — Medication 250 MILLIGRAM(S): at 13:48

## 2018-01-11 RX ADMIN — Medication 30 MILLIGRAM(S): at 06:10

## 2018-01-11 RX ADMIN — Medication 975 MILLIGRAM(S): at 14:00

## 2018-01-11 RX ADMIN — Medication 975 MILLIGRAM(S): at 06:10

## 2018-01-11 RX ADMIN — Medication 975 MILLIGRAM(S): at 13:48

## 2018-01-11 RX ADMIN — Medication 325 MILLIGRAM(S): at 11:43

## 2018-01-11 RX ADMIN — Medication 1 TABLET(S): at 11:43

## 2018-01-11 RX ADMIN — Medication 250 MILLIGRAM(S): at 21:53

## 2018-01-11 RX ADMIN — OXYCODONE HYDROCHLORIDE 5 MILLIGRAM(S): 5 TABLET ORAL at 18:10

## 2018-01-11 RX ADMIN — Medication 325 MILLIGRAM(S): at 08:04

## 2018-01-11 RX ADMIN — HEPARIN SODIUM 5000 UNIT(S): 5000 INJECTION INTRAVENOUS; SUBCUTANEOUS at 05:38

## 2018-01-11 RX ADMIN — Medication 600 MILLIGRAM(S): at 11:43

## 2018-01-11 RX ADMIN — HEPARIN SODIUM 5000 UNIT(S): 5000 INJECTION INTRAVENOUS; SUBCUTANEOUS at 17:39

## 2018-01-11 RX ADMIN — Medication 975 MILLIGRAM(S): at 05:38

## 2018-01-11 RX ADMIN — Medication 325 MILLIGRAM(S): at 17:39

## 2018-01-11 RX ADMIN — Medication 975 MILLIGRAM(S): at 22:22

## 2018-01-11 RX ADMIN — Medication 100 MILLIGRAM(S): at 21:52

## 2018-01-11 RX ADMIN — Medication 600 MILLIGRAM(S): at 12:10

## 2018-01-11 RX ADMIN — Medication 600 MILLIGRAM(S): at 17:39

## 2018-01-11 RX ADMIN — Medication 975 MILLIGRAM(S): at 00:54

## 2018-01-11 RX ADMIN — Medication 975 MILLIGRAM(S): at 01:25

## 2018-01-11 RX ADMIN — Medication 30 MILLIGRAM(S): at 05:38

## 2018-01-11 RX ADMIN — OXYCODONE HYDROCHLORIDE 5 MILLIGRAM(S): 5 TABLET ORAL at 16:18

## 2018-01-11 RX ADMIN — Medication 975 MILLIGRAM(S): at 21:52

## 2018-01-11 RX ADMIN — Medication 250 MILLIGRAM(S): at 05:38

## 2018-01-11 RX ADMIN — Medication 600 MILLIGRAM(S): at 18:10

## 2018-01-12 ENCOUNTER — TRANSCRIPTION ENCOUNTER (OUTPATIENT)
Age: 38
End: 2018-01-12

## 2018-01-12 VITALS
RESPIRATION RATE: 18 BRPM | DIASTOLIC BLOOD PRESSURE: 60 MMHG | SYSTOLIC BLOOD PRESSURE: 109 MMHG | TEMPERATURE: 98 F | HEART RATE: 76 BPM

## 2018-01-12 LAB
BASOPHILS # BLD AUTO: 0 K/UL — SIGNIFICANT CHANGE UP (ref 0–0.2)
BASOPHILS NFR BLD AUTO: 0.3 % — SIGNIFICANT CHANGE UP (ref 0–2)
EOSINOPHIL # BLD AUTO: 0.1 K/UL — SIGNIFICANT CHANGE UP (ref 0–0.5)
EOSINOPHIL NFR BLD AUTO: 0.7 % — SIGNIFICANT CHANGE UP (ref 0–6)
HCT VFR BLD CALC: 26.2 % — LOW (ref 34.5–45)
HGB BLD-MCNC: 9.1 G/DL — LOW (ref 11.5–15.5)
LYMPHOCYTES # BLD AUTO: 2.2 K/UL — SIGNIFICANT CHANGE UP (ref 1–3.3)
LYMPHOCYTES # BLD AUTO: 26.7 % — SIGNIFICANT CHANGE UP (ref 13–44)
MCHC RBC-ENTMCNC: 33.4 PG — SIGNIFICANT CHANGE UP (ref 27–34)
MCHC RBC-ENTMCNC: 34.9 GM/DL — SIGNIFICANT CHANGE UP (ref 32–36)
MCV RBC AUTO: 95.8 FL — SIGNIFICANT CHANGE UP (ref 80–100)
MONOCYTES # BLD AUTO: 0.5 K/UL — SIGNIFICANT CHANGE UP (ref 0–0.9)
MONOCYTES NFR BLD AUTO: 6.2 % — SIGNIFICANT CHANGE UP (ref 2–14)
NEUTROPHILS # BLD AUTO: 5.4 K/UL — SIGNIFICANT CHANGE UP (ref 1.8–7.4)
NEUTROPHILS NFR BLD AUTO: 66.1 % — SIGNIFICANT CHANGE UP (ref 43–77)
PLATELET # BLD AUTO: 174 K/UL — SIGNIFICANT CHANGE UP (ref 150–400)
RBC # BLD: 2.73 M/UL — LOW (ref 3.8–5.2)
RBC # FLD: 11.7 % — SIGNIFICANT CHANGE UP (ref 10.3–14.5)
WBC # BLD: 8.2 K/UL — SIGNIFICANT CHANGE UP (ref 3.8–10.5)
WBC # FLD AUTO: 8.2 K/UL — SIGNIFICANT CHANGE UP (ref 3.8–10.5)

## 2018-01-12 PROCEDURE — 59025 FETAL NON-STRESS TEST: CPT

## 2018-01-12 PROCEDURE — 86901 BLOOD TYPING SEROLOGIC RH(D): CPT

## 2018-01-12 PROCEDURE — 86850 RBC ANTIBODY SCREEN: CPT

## 2018-01-12 PROCEDURE — 59050 FETAL MONITOR W/REPORT: CPT

## 2018-01-12 PROCEDURE — 86900 BLOOD TYPING SEROLOGIC ABO: CPT

## 2018-01-12 PROCEDURE — 85027 COMPLETE CBC AUTOMATED: CPT

## 2018-01-12 PROCEDURE — 86780 TREPONEMA PALLIDUM: CPT

## 2018-01-12 RX ORDER — OXYCODONE HYDROCHLORIDE 5 MG/1
1 TABLET ORAL
Qty: 24 | Refills: 0
Start: 2018-01-12 | End: 2018-01-15

## 2018-01-12 RX ORDER — IBUPROFEN 200 MG
1 TABLET ORAL
Qty: 0 | Refills: 0 | DISCHARGE
Start: 2018-01-12

## 2018-01-12 RX ORDER — ACETAMINOPHEN 500 MG
3 TABLET ORAL
Qty: 0 | Refills: 0 | DISCHARGE
Start: 2018-01-12

## 2018-01-12 RX ADMIN — Medication 250 MILLIGRAM(S): at 05:51

## 2018-01-12 RX ADMIN — Medication 325 MILLIGRAM(S): at 09:40

## 2018-01-12 RX ADMIN — Medication 100 MILLIGRAM(S): at 05:52

## 2018-01-12 RX ADMIN — Medication 600 MILLIGRAM(S): at 06:21

## 2018-01-12 RX ADMIN — Medication 600 MILLIGRAM(S): at 05:51

## 2018-01-12 RX ADMIN — Medication 975 MILLIGRAM(S): at 09:40

## 2018-01-12 RX ADMIN — HEPARIN SODIUM 5000 UNIT(S): 5000 INJECTION INTRAVENOUS; SUBCUTANEOUS at 05:52

## 2018-01-12 NOTE — DISCHARGE NOTE OB - PATIENT PORTAL LINK FT
“You can access the Follow[employer] Patient Portal, offered by [facility], by registering with the following website: [email or web address]”

## 2018-01-12 NOTE — DISCHARGE NOTE OB - MEDICATION SUMMARY - MEDICATIONS TO TAKE
I will START or STAY ON the medications listed below when I get home from the hospital:    ibuprofen 600 mg oral tablet  -- 1 tab(s) by mouth every 6 hours  -- Indication: For  delivery delivered    acetaminophen 325 mg oral tablet  -- 3 tab(s) by mouth every 6 hours  -- Indication: For  delivery delivered    oxyCODONE 5 mg oral tablet  -- 1 tab(s) by mouth every 4 hours, As needed, Severe Pain (7 - 10) MDD:5  -- Indication: For  delivery delivered

## 2018-01-12 NOTE — DISCHARGE NOTE OB - CARE PLAN
Principal Discharge DX:	 delivery delivered  Goal:	Return to normal activity  Instructions for follow-up, activity and diet:	regular diet; activity as tolerated; Appt 2 weeks- please call Principal Discharge DX:	 delivery delivered  Goal:	Return to normal activity  Assessment and plan of treatment:	regular diet; activity as tolerated; Appt 2 weeks- please call

## 2018-01-12 NOTE — DISCHARGE NOTE OB - HOSPITAL COURSE
section was performed without complications.  The patient met all appropriate post operative milestones.  The patient was able to void, tolerated a regular diet, and ambulated on her own.  The patient was discharged afebrile, with stable vital signs and with instructions on follow up.

## 2018-01-12 NOTE — DISCHARGE NOTE OB - MATERIALS PROVIDED
Shaken Baby Prevention Handout/Breastfeeding Guide and Packet/Birth Certificate Instructions/Breastfeeding Mother’s Support Group Information/Guide to Postpartum Care/Back To Sleep Handout/[facility]  Screening Program/Breastfeeding Log/Bottle Feeding Log

## 2018-01-12 NOTE — PROGRESS NOTE ADULT - SUBJECTIVE AND OBJECTIVE BOX
OB Postpartum Note:  Delivery, POD#3    S: 36yo  POD#3 s/p LTCS. The patient feels well.  Pain is well controlled. She is tolerating a regular diet and passing flatus. She is voiding spontaneously, and ambulating without difficulty. Denies CP/SOB. Denies lightheadedness/dizziness. Denies N/V.    O:  Vitals:  Vital Signs Last 24 Hrs  T(C): 36.4 (2018 21:15), Max: 36.6 (2018 05:00)  T(F): 97.5 (2018 21:15), Max: 97.9 (2018 05:00)  HR: 83 (2018 21:15) (74 - 85)  BP: 103/69 (2018 21:15) (95/66 - 116/74)  BP(mean): --  RR: 18 (2018 21:15) (18 - 18)  SpO2: --    MEDICATIONS  (STANDING):  acetaminophen   Tablet. 975 milliGRAM(s) Oral every 6 hours  ascorbic acid 250 milliGRAM(s) Oral three times a day  diphtheria/tetanus/pertussis (acellular) Vaccine (ADAcel) 0.5 milliLiter(s) IntraMuscular once  ferrous    sulfate 325 milliGRAM(s) Oral three times a day with meals  heparin  Injectable 5000 Unit(s) SubCutaneous every 12 hours  ibuprofen  Tablet 600 milliGRAM(s) Oral every 6 hours  ketorolac   Injectable 30 milliGRAM(s) IV Push every 6 hours  lactated ringers. 1000 milliLiter(s) (125 mL/Hr) IV Continuous <Continuous>  oxyCODONE    IR 5 milliGRAM(s) Oral every 3 hours  oxytocin Infusion 333.333 milliUNIT(s)/Min (1000 mL/Hr) IV Continuous <Continuous>  oxytocin Infusion 41.667 milliUNIT(s)/Min (125 mL/Hr) IV Continuous <Continuous>  prenatal multivitamin 1 Tablet(s) Oral daily    MEDICATIONS  (PRN):  diphenhydrAMINE   Capsule 25 milliGRAM(s) Oral every 6 hours PRN Itching  docusate sodium 100 milliGRAM(s) Oral two times a day PRN Stool Softening  glycerin Suppository - Adult 1 Suppository(s) Rectal at bedtime PRN Constipation  lanolin Ointment 1 Application(s) Topical every 3 hours PRN Sore Nipples  oxyCODONE    IR 5 milliGRAM(s) Oral every 4 hours PRN Severe Pain (7 - 10)  simethicone 80 milliGRAM(s) Chew every 4 hours PRN Gas      LABS:  Blood type: A Positive  Rubella IgG: RPR: Negative                          9.5<L>   12.18<H> >-----------< 184    (  @ 09:02 )             28.7<L>                        8.6<L>   9.60 >-----------< 142<L>    ( 01-10 @ 09:25 )             26.4<L>                        12.2   8.2 >-----------< 164    (  @ 11:08 )             35.6                  Physical exam:  Gen: NAD  Abdomen: Soft, nontender, no distension , firm uterine fundus  Incision: Clean, dry, and intact   Pelvic: Normal lochia noted  Ext: No calf tenderness
Day 1 of Anesthesia Pain Management Service    SUBJECTIVE: I'm okay  Pain Scale Score:    [X] Refer to charted pain scores    THERAPY: Epidural Bupivacaine 0.01 % and Fentanyl 3 micrograms/mL     Demand Dose: 3 mL  Lockout: 15 minutes   Continuous Rate:  10 mL    MEDICATIONS  (STANDING):  acetaminophen   Tablet. 975 milliGRAM(s) Oral every 6 hours  diphtheria/tetanus/pertussis (acellular) Vaccine (ADAcel) 0.5 milliLiter(s) IntraMuscular once  ferrous    sulfate 325 milliGRAM(s) Oral daily  heparin  Injectable 5000 Unit(s) SubCutaneous every 12 hours  HYDROmorphone PCA (1 mG/mL) 30 milliLiter(s) PCA Continuous PCA Continuous  ibuprofen  Tablet 600 milliGRAM(s) Oral every 6 hours  ketorolac   Injectable 30 milliGRAM(s) IV Push every 6 hours  lactated ringers. 1000 milliLiter(s) (125 mL/Hr) IV Continuous <Continuous>  oxyCODONE    IR 5 milliGRAM(s) Oral every 3 hours  oxytocin Infusion 333.333 milliUNIT(s)/Min (1000 mL/Hr) IV Continuous <Continuous>  oxytocin Infusion 41.667 milliUNIT(s)/Min (125 mL/Hr) IV Continuous <Continuous>  prenatal multivitamin 1 Tablet(s) Oral daily    MEDICATIONS  (PRN):  diphenhydrAMINE   Capsule 25 milliGRAM(s) Oral every 6 hours PRN Itching  docusate sodium 100 milliGRAM(s) Oral two times a day PRN Stool Softening  glycerin Suppository - Adult 1 Suppository(s) Rectal at bedtime PRN Constipation  lanolin Ointment 1 Application(s) Topical every 3 hours PRN Sore Nipples  metoclopramide Injectable 10 milliGRAM(s) IV Push once PRN Nausea and/or Vomiting  naloxone Injectable 0.1 milliGRAM(s) IV Push every 3 minutes PRN For ANY of the following changes in patient status:  A. RR LESS THAN 10 breaths per minute, B. Oxygen saturation LESS THAN 90%, C. Sedation score of 6  ondansetron Injectable 4 milliGRAM(s) IV Push every 6 hours PRN Nausea  oxyCODONE    IR 5 milliGRAM(s) Oral every 4 hours PRN Severe Pain (7 - 10)  simethicone 80 milliGRAM(s) Chew every 4 hours PRN Gas      OBJECTIVE:    Assessment of Epidural Catheter Site: 	    [X] Dressing intact	[X] Site non-tender	[X] Site without erythema, discharge, edema  [X] Epidural tubing and connection checked	[X] Gross neurological exam within normal limits  [ ] Catheter removed – tip intact		                          12.2   8.2   )-----------( 164      ( 09 Jan 2018 11:08 )             35.6     Vital Signs Last 24 Hrs  T(C): 36.7 (01-10-18 @ 09:00), Max: 36.9 (01-09-18 @ 17:40)  T(F): 98.1 (01-10-18 @ 09:00), Max: 98.4 (01-09-18 @ 17:40)  HR: 78 (01-10-18 @ 09:00) (76 - 98)  BP: 95/60 (01-10-18 @ 09:00) (91/59 - 127/62)  BP(mean): 70 (01-09-18 @ 17:40) (70 - 78)  RR: 18 (01-10-18 @ 09:00) (15 - 43)  SpO2: 99% (01-10-18 @ 09:00) (94% - 100%)      Sedation Score:	[X] Alert	[ ] Drowsy	[ ] Arousable  [ ] Asleep  [ ] Unresponsive    Side Effects:	[X] None	[ ] Nausea	[ ] Vomiting  [ ] Pruritus  		[ ] Weakness  [ ] Numbness  [ ] Other:    ASSESSMENT/ PLAN:    Therapy:                         [X] Continue   [ ] Discontinue   [ ] Change to PRN Analgesics    Documentation and Verification of current medications:  [X] Done	[ ] Not done, not eligible, reason:    Comments: I feel a little dizzy. Continuous rate decreased to 8mL.
Day 1 of Anesthesia Pain Management Service    SUBJECTIVE: Patient is doing well with IV PCA    Pain Scale Score:	[X] Refer to charted pain scores    THERAPY:    [ ] IV PCA Morphine		[ ] 5 mg/mL	[ ] 1 mg/mL  [X] IV PCA Hydromorphone	[ ] 5 mg/mL	[X] 1 mg/mL  [ ] IV PCA Fentanyl		[ ] 50 micrograms/mL    Demand dose: 0.2 mg     Lockout: 6 minutes   Continuous Rate: 0 mg/hr  4 Hour Limit: 4 mg    MEDICATIONS  (STANDING):  acetaminophen   Tablet. 975 milliGRAM(s) Oral every 6 hours  diphtheria/tetanus/pertussis (acellular) Vaccine (ADAcel) 0.5 milliLiter(s) IntraMuscular once  ferrous    sulfate 325 milliGRAM(s) Oral daily  heparin  Injectable 5000 Unit(s) SubCutaneous every 12 hours  HYDROmorphone PCA (1 mG/mL) 30 milliLiter(s) PCA Continuous PCA Continuous  ibuprofen  Tablet 600 milliGRAM(s) Oral every 6 hours  ketorolac   Injectable 30 milliGRAM(s) IV Push every 6 hours  lactated ringers. 1000 milliLiter(s) (125 mL/Hr) IV Continuous <Continuous>  oxyCODONE    IR 5 milliGRAM(s) Oral every 3 hours  oxytocin Infusion 333.333 milliUNIT(s)/Min (1000 mL/Hr) IV Continuous <Continuous>  oxytocin Infusion 41.667 milliUNIT(s)/Min (125 mL/Hr) IV Continuous <Continuous>  prenatal multivitamin 1 Tablet(s) Oral daily    MEDICATIONS  (PRN):  diphenhydrAMINE   Capsule 25 milliGRAM(s) Oral every 6 hours PRN Itching  docusate sodium 100 milliGRAM(s) Oral two times a day PRN Stool Softening  glycerin Suppository - Adult 1 Suppository(s) Rectal at bedtime PRN Constipation  lanolin Ointment 1 Application(s) Topical every 3 hours PRN Sore Nipples  metoclopramide Injectable 10 milliGRAM(s) IV Push once PRN Nausea and/or Vomiting  naloxone Injectable 0.1 milliGRAM(s) IV Push every 3 minutes PRN For ANY of the following changes in patient status:  A. RR LESS THAN 10 breaths per minute, B. Oxygen saturation LESS THAN 90%, C. Sedation score of 6  ondansetron Injectable 4 milliGRAM(s) IV Push every 6 hours PRN Nausea  oxyCODONE    IR 5 milliGRAM(s) Oral every 4 hours PRN Severe Pain (7 - 10)  simethicone 80 milliGRAM(s) Chew every 4 hours PRN Gas      OBJECTIVE:    Sedation Score:	[ X] Alert	[ ] Drowsy 	[ ] Arousable	[ ] Asleep	[ ] Unresponsive    Side Effects:	[X ] None	[ ] Nausea	[ ] Vomiting	[ ] Pruritus  		[ ] Other:    Vital Signs Last 24 Hrs  T(C): 36.7 (10 Fernando 2018 09:00), Max: 36.9 (09 Jan 2018 17:40)  T(F): 98.1 (10 Fernando 2018 09:00), Max: 98.4 (09 Jan 2018 17:40)  HR: 78 (10 Fernando 2018 09:00) (76 - 98)  BP: 95/60 (10 Fernando 2018 09:00) (91/59 - 127/62)  BP(mean): 70 (09 Jan 2018 17:40) (70 - 78)  RR: 18 (10 Fernando 2018 09:00) (15 - 43)  SpO2: 99% (10 Fernando 2018 09:00) (94% - 100%)    ASSESSMENT/ PLAN    Therapy to  be:               [X] Continued   [ ] Discontinued   [ ] Changed to PRN Analgesics    Documentation and Verification of current medications:   [X] Done	[ ] Not done, not eligible    Comments:
Day 2 of Anesthesia Pain Management Service    SUBJECTIVE: Patient is doing well with IV PCA    Pain Scale Score:	[X] Refer to charted pain scores    THERAPY:    [ ] IV PCA Morphine		[ ] 5 mg/mL	[ ] 1 mg/mL  [X] IV PCA Hydromorphone	[ ] 5 mg/mL	[X] 1 mg/mL  [ ] IV PCA Fentanyl		[ ] 50 micrograms/mL    Demand dose: 0.1 mg     Lockout: 6 minutes   Continuous Rate: 0 mg/hr  4 Hour Limit: 4 mg    MEDICATIONS  (STANDING):  acetaminophen   Tablet. 975 milliGRAM(s) Oral every 6 hours  ascorbic acid 250 milliGRAM(s) Oral three times a day  diphtheria/tetanus/pertussis (acellular) Vaccine (ADAcel) 0.5 milliLiter(s) IntraMuscular once  ferrous    sulfate 325 milliGRAM(s) Oral three times a day with meals  heparin  Injectable 5000 Unit(s) SubCutaneous every 12 hours  HYDROmorphone PCA (1 mG/mL) 30 milliLiter(s) PCA Continuous PCA Continuous  ibuprofen  Tablet 600 milliGRAM(s) Oral every 6 hours  ketorolac   Injectable 30 milliGRAM(s) IV Push every 6 hours  lactated ringers. 1000 milliLiter(s) (125 mL/Hr) IV Continuous <Continuous>  oxyCODONE    IR 5 milliGRAM(s) Oral every 3 hours  oxytocin Infusion 333.333 milliUNIT(s)/Min (1000 mL/Hr) IV Continuous <Continuous>  oxytocin Infusion 41.667 milliUNIT(s)/Min (125 mL/Hr) IV Continuous <Continuous>  prenatal multivitamin 1 Tablet(s) Oral daily    MEDICATIONS  (PRN):  diphenhydrAMINE   Capsule 25 milliGRAM(s) Oral every 6 hours PRN Itching  docusate sodium 100 milliGRAM(s) Oral two times a day PRN Stool Softening  glycerin Suppository - Adult 1 Suppository(s) Rectal at bedtime PRN Constipation  lanolin Ointment 1 Application(s) Topical every 3 hours PRN Sore Nipples  naloxone Injectable 0.1 milliGRAM(s) IV Push every 3 minutes PRN For ANY of the following changes in patient status:  A. RR LESS THAN 10 breaths per minute, B. Oxygen saturation LESS THAN 90%, C. Sedation score of 6  ondansetron Injectable 4 milliGRAM(s) IV Push every 6 hours PRN Nausea  oxyCODONE    IR 5 milliGRAM(s) Oral every 4 hours PRN Severe Pain (7 - 10)  simethicone 80 milliGRAM(s) Chew every 4 hours PRN Gas      OBJECTIVE:    Sedation Score:	[ X] Alert	[ ] Drowsy 	[ ] Arousable	[ ] Asleep	[ ] Unresponsive    Side Effects:	[X ] None	[ ] Nausea	[ ] Vomiting	[ ] Pruritus  		[ ] Other:    Vital Signs Last 24 Hrs  T(C): 36.6 (11 Jan 2018 05:00), Max: 36.9 (10 Fernando 2018 21:46)  T(F): 97.9 (11 Jan 2018 05:00), Max: 98.5 (10 Fernando 2018 21:46)  HR: 85 (11 Jan 2018 05:00) (78 - 92)  BP: 116/74 (11 Jan 2018 05:00) (90/55 - 116/74)  BP(mean): --  RR: 18 (11 Jan 2018 05:00) (18 - 18)  SpO2: 98% (10 Fernando 2018 21:46) (98% - 100%)    ASSESSMENT/ PLAN    Therapy to  be:               [X] Continued   [ ] Discontinued   [ ] Changed to PRN Analgesics    Documentation and Verification of current medications:   [X] Done	[ ] Not done, not elligible    Comments:
Day 2 of Anesthesia Pain Management Service    SUBJECTIVE: Patient is doing well with IV PCA    Pain Scale Score:	[X] Refer to charted pain scores    THERAPY:    [ ] IV PCA Morphine		[ ] 5 mg/mL	[ ] 1 mg/mL  [X] IV PCA Hydromorphone	[ ] 5 mg/mL	[X] 1 mg/mL  [ ] IV PCA Fentanyl		[ ] 50 micrograms/mL    Demand dose: 0.1 mg     Lockout: 6 minutes   Continuous Rate: 0 mg/hr  4 Hour Limit: 4 mg    MEDICATIONS  (STANDING):  acetaminophen   Tablet. 975 milliGRAM(s) Oral every 6 hours  ascorbic acid 250 milliGRAM(s) Oral three times a day  diphtheria/tetanus/pertussis (acellular) Vaccine (ADAcel) 0.5 milliLiter(s) IntraMuscular once  ferrous    sulfate 325 milliGRAM(s) Oral three times a day with meals  heparin  Injectable 5000 Unit(s) SubCutaneous every 12 hours  ibuprofen  Tablet 600 milliGRAM(s) Oral every 6 hours  ketorolac   Injectable 30 milliGRAM(s) IV Push every 6 hours  lactated ringers. 1000 milliLiter(s) (125 mL/Hr) IV Continuous <Continuous>  oxyCODONE    IR 5 milliGRAM(s) Oral every 3 hours  oxytocin Infusion 333.333 milliUNIT(s)/Min (1000 mL/Hr) IV Continuous <Continuous>  oxytocin Infusion 41.667 milliUNIT(s)/Min (125 mL/Hr) IV Continuous <Continuous>  prenatal multivitamin 1 Tablet(s) Oral daily    MEDICATIONS  (PRN):  diphenhydrAMINE   Capsule 25 milliGRAM(s) Oral every 6 hours PRN Itching  docusate sodium 100 milliGRAM(s) Oral two times a day PRN Stool Softening  glycerin Suppository - Adult 1 Suppository(s) Rectal at bedtime PRN Constipation  lanolin Ointment 1 Application(s) Topical every 3 hours PRN Sore Nipples  oxyCODONE    IR 5 milliGRAM(s) Oral every 4 hours PRN Severe Pain (7 - 10)  simethicone 80 milliGRAM(s) Chew every 4 hours PRN Gas      OBJECTIVE:    Sedation Score:	[ X] Alert	[ ] Drowsy 	[ ] Arousable	[ ] Asleep	[ ] Unresponsive    Side Effects:	[ ] None	[ ] Nausea	[ ] Vomiting	[X] Pruritus  		[ ] Other:    Vital Signs Last 24 Hrs  T(C): 36.6 (11 Jan 2018 05:00), Max: 36.9 (10 Fernando 2018 21:46)  T(F): 97.9 (11 Jan 2018 05:00), Max: 98.5 (10 Fernando 2018 21:46)  HR: 85 (11 Jan 2018 05:00) (79 - 92)  BP: 116/74 (11 Jan 2018 05:00) (90/55 - 116/74)  BP(mean): --  RR: 18 (11 Jan 2018 05:00) (18 - 18)  SpO2: 98% (10 Fernando 2018 21:46) (98% - 100%)    ASSESSMENT/ PLAN    Therapy to  be:               [ ] Continued   [X] Discontinued   [X] Changed to PRN Analgesics    Documentation and Verification of current medications:   [X] Done	[ ] Not done, not eligible    Comments:
OB Progress Note:  Delivery, POD#1    S: 38yo POD#1 s/p LTCS . Her pain is well controlled. She is tolerating a regular diet and passing flatus. Denies N/V. Denies CP/SOB/lightheadedness/dizziness.   She has been minimally ambulating in room without difficulty.  Indwelling catheter has been removed in AM, DTV.     O:   Vital Signs Last 24 Hrs  T(C): 36.7 (10 Fernando 2018 05:00), Max: 36.9 (2018 17:40)  T(F): 98 (10 Fernando 2018 05:00), Max: 98.4 (2018 17:40)  HR: 91 (10 Fernando 2018 05:00) (76 - 98)  BP: 127/62 (10 Fernando 2018 05:00) (91/59 - 127/62)  BP(mean): 70 (2018 17:40) (70 - 78)  RR: 18 (10 Fernando 2018 05:00) (15 - 43)  SpO2: 97% (10 Fernando 2018 05:00) (94% - 100%)    Labs:  Blood type: A Positive  Rubella IgG: RPR: Negative                          12.2   8.2 >-----------< 164    (  @ 11:08 )             35.6                  PE:  General: NAD  Abdomen: Mildly distended, appropriately tender, incision c/d/i.  Extremities: No erythema, no pitting edema
OB Progress Note: TLCS, POD#2    S: 38yo  POD#2 s/p LTCS. Pain is well controlled. She is tolerating a regular diet and passing flatus. She is voiding spontaneously, and ambulating without difficulty. Denies CP/SOB. Denies lightheadedness/dizziness. Denies N/V.    O:  Vitals:  Vital Signs Last 24 Hrs  T(C): 36.8 (11 Jan 2018 00:59), Max: 36.9 (10 Fernando 2018 21:46)  T(F): 98.2 (11 Jan 2018 00:59), Max: 98.5 (10 Fernando 2018 21:46)  HR: 85 (11 Jan 2018 00:59) (78 - 92)  BP: 90/55 (11 Jan 2018 00:59) (90/55 - 101/64)  BP(mean): --  RR: 18 (11 Jan 2018 00:59) (18 - 18)  SpO2: 98% (10 Fernando 2018 21:46) (98% - 100%)    MEDICATIONS  (STANDING):  acetaminophen   Tablet. 975 milliGRAM(s) Oral every 6 hours  ascorbic acid 250 milliGRAM(s) Oral three times a day  diphtheria/tetanus/pertussis (acellular) Vaccine (ADAcel) 0.5 milliLiter(s) IntraMuscular once  ferrous    sulfate 325 milliGRAM(s) Oral three times a day with meals  heparin  Injectable 5000 Unit(s) SubCutaneous every 12 hours  HYDROmorphone PCA (1 mG/mL) 30 milliLiter(s) PCA Continuous PCA Continuous  ibuprofen  Tablet 600 milliGRAM(s) Oral every 6 hours  ketorolac   Injectable 30 milliGRAM(s) IV Push every 6 hours  lactated ringers. 1000 milliLiter(s) (125 mL/Hr) IV Continuous <Continuous>  oxyCODONE    IR 5 milliGRAM(s) Oral every 3 hours  oxytocin Infusion 333.333 milliUNIT(s)/Min (1000 mL/Hr) IV Continuous <Continuous>  oxytocin Infusion 41.667 milliUNIT(s)/Min (125 mL/Hr) IV Continuous <Continuous>  prenatal multivitamin 1 Tablet(s) Oral daily      MEDICATIONS  (PRN):  diphenhydrAMINE   Capsule 25 milliGRAM(s) Oral every 6 hours PRN Itching  docusate sodium 100 milliGRAM(s) Oral two times a day PRN Stool Softening  glycerin Suppository - Adult 1 Suppository(s) Rectal at bedtime PRN Constipation  lanolin Ointment 1 Application(s) Topical every 3 hours PRN Sore Nipples  naloxone Injectable 0.1 milliGRAM(s) IV Push every 3 minutes PRN For ANY of the following changes in patient status:  A. RR LESS THAN 10 breaths per minute, B. Oxygen saturation LESS THAN 90%, C. Sedation score of 6  ondansetron Injectable 4 milliGRAM(s) IV Push every 6 hours PRN Nausea  oxyCODONE    IR 5 milliGRAM(s) Oral every 4 hours PRN Severe Pain (7 - 10)  simethicone 80 milliGRAM(s) Chew every 4 hours PRN Gas      Labs:  Blood type: A Positive  Rubella IgG: RPR: Negative                          8.6<L>   9.60 >-----------< 142<L>    ( 01-10 @ 09:25 )             26.4<L>                        12.2   8.2 >-----------< 164    ( 01-09 @ 11:08 )             35.6                  PE:  General: NAD  Abdomen: Soft, appropriately tender, incision c/d/i.  Pelvic: Lochia normal   Extremities: NTBL
Pain Management Attending Addendum    SUBJECTIVE: Patient doing well with PCEA    Therapy:    [X] PCEA    OBJECTIVE:   [X] Pain appropriately controlled    [ ] Other:    Side Effects:  [X] None	             [ ] Nausea              [ ] Pruritis        [ ] Weakness          [ ] Numbness        	[ ] Other:    ASSESSMENT/PLAN:    [ ] Continue current therapy    [X] Therapy changed to:    [X] PRN Analgesics   [ ] IV PCA    Comments:    I discussed the case with the resident and agree with their plan.
Pain Management Attending Addendum    SUBJECTIVE: Patient doing well with PCEA    Therapy:    [X] PCEA    OBJECTIVE:   [X] Pain appropriately controlled    [ ] Other:    Side Effects:  [X] None	             [ ] Nausea              [ ] Pruritis        [ ] Weakness          [ ] Numbness        	[ ] Other:    ASSESSMENT/PLAN:    [X] Continue current therapy    [ ] Therapy changed to:    [ ] PRN Analgesics   [ ] IV PCA    Comments:

## 2018-01-12 NOTE — PROGRESS NOTE ADULT - PROBLEM SELECTOR PLAN 1
- Continue motrin, tylenol, oxycodone PRN for pain control.  - Increase ambulation  - Continue regular diet  - Discharge planning    Guerita Flynn DO PGY1
- Continue PO analgesia.   - Continue regular diet.  - Increase ambulation.      Guerita Flynn D.O. PGY1
- Continue regular diet.  - Increase ambulation.  - Continue motrin, tylenol, oxycodone PRN for pain control.  .  - DTV today   - F/u AM JESSE Swartz PGY-1

## 2018-01-12 NOTE — PROGRESS NOTE ADULT - ASSESSMENT
A/P: 36yo  POD#3 s/p LTCS.  Patient is stable and is doing well post-operatively.
A/P: 36yo  POD#1 s/p LTCS.  Patient is stable and doing well post-operatively.
A/P: 36yo POD#2 s/p LTCS.  Patient is stable and doing well post-operatively.

## 2018-01-12 NOTE — DISCHARGE NOTE OB - CARE PROVIDER_API CALL
Brandy Marquez (MD), OBSGYN  General [address] [address]  Phone: [phone]  Fax: [phone]

## 2018-01-23 ENCOUNTER — APPOINTMENT (OUTPATIENT)
Dept: OBGYN | Facility: CLINIC | Age: 38
End: 2018-01-23
Payer: COMMERCIAL

## 2018-01-23 LAB — SURGICAL PATHOLOGY STUDY: SIGNIFICANT CHANGE UP

## 2018-01-23 PROCEDURE — 0503F POSTPARTUM CARE VISIT: CPT

## 2018-02-23 ENCOUNTER — APPOINTMENT (OUTPATIENT)
Dept: OBGYN | Facility: CLINIC | Age: 38
End: 2018-02-23
Payer: COMMERCIAL

## 2018-02-23 PROCEDURE — 0503F POSTPARTUM CARE VISIT: CPT

## 2018-05-25 ENCOUNTER — APPOINTMENT (OUTPATIENT)
Dept: OBGYN | Facility: CLINIC | Age: 38
End: 2018-05-25
Payer: COMMERCIAL

## 2018-05-25 ENCOUNTER — RESULT REVIEW (OUTPATIENT)
Age: 38
End: 2018-05-25

## 2018-05-25 PROCEDURE — 99213 OFFICE O/P EST LOW 20 MIN: CPT | Mod: 25

## 2018-05-25 PROCEDURE — 99395 PREV VISIT EST AGE 18-39: CPT

## 2018-06-12 ENCOUNTER — APPOINTMENT (OUTPATIENT)
Dept: ULTRASOUND IMAGING | Facility: CLINIC | Age: 38
End: 2018-06-12
Payer: COMMERCIAL

## 2018-06-12 ENCOUNTER — OUTPATIENT (OUTPATIENT)
Dept: OUTPATIENT SERVICES | Facility: HOSPITAL | Age: 38
LOS: 1 days | End: 2018-06-12
Payer: COMMERCIAL

## 2018-06-12 DIAGNOSIS — Z00.8 ENCOUNTER FOR OTHER GENERAL EXAMINATION: ICD-10-CM

## 2018-06-12 PROCEDURE — 76830 TRANSVAGINAL US NON-OB: CPT

## 2018-06-12 PROCEDURE — 76856 US EXAM PELVIC COMPLETE: CPT | Mod: 26

## 2018-06-12 PROCEDURE — 76830 TRANSVAGINAL US NON-OB: CPT | Mod: 26

## 2018-06-12 PROCEDURE — 76856 US EXAM PELVIC COMPLETE: CPT

## 2018-07-02 ENCOUNTER — APPOINTMENT (OUTPATIENT)
Dept: OBGYN | Facility: CLINIC | Age: 38
End: 2018-07-02

## 2019-07-31 ENCOUNTER — RESULT REVIEW (OUTPATIENT)
Age: 39
End: 2019-07-31

## 2019-08-01 ENCOUNTER — APPOINTMENT (OUTPATIENT)
Dept: OBGYN | Facility: CLINIC | Age: 39
End: 2019-08-01
Payer: COMMERCIAL

## 2019-08-01 ENCOUNTER — FORM ENCOUNTER (OUTPATIENT)
Age: 39
End: 2019-08-01

## 2019-08-01 PROCEDURE — 99395 PREV VISIT EST AGE 18-39: CPT

## 2020-04-07 ENCOUNTER — FORM ENCOUNTER (OUTPATIENT)
Age: 40
End: 2020-04-07

## 2020-04-17 ENCOUNTER — TRANSCRIPTION ENCOUNTER (OUTPATIENT)
Age: 40
End: 2020-04-17

## 2020-06-08 NOTE — PROGRESS NOTE ADULT - PROBLEM/PLAN-1
Patient resting comfortably on stretcher, urinal provided, NAD and no requests at this time Side rails up x2, bed Low locked position   
DISPLAY PLAN FREE TEXT

## 2020-06-25 ENCOUNTER — APPOINTMENT (OUTPATIENT)
Dept: OBGYN | Facility: CLINIC | Age: 40
End: 2020-06-25
Payer: COMMERCIAL

## 2020-06-25 PROCEDURE — 99212 OFFICE O/P EST SF 10 MIN: CPT | Mod: 95

## 2020-10-20 ENCOUNTER — APPOINTMENT (OUTPATIENT)
Dept: ULTRASOUND IMAGING | Facility: IMAGING CENTER | Age: 40
End: 2020-10-20
Payer: COMMERCIAL

## 2020-10-20 ENCOUNTER — RESULT REVIEW (OUTPATIENT)
Age: 40
End: 2020-10-20

## 2020-10-20 ENCOUNTER — OUTPATIENT (OUTPATIENT)
Dept: OUTPATIENT SERVICES | Facility: HOSPITAL | Age: 40
LOS: 1 days | End: 2020-10-20
Payer: COMMERCIAL

## 2020-10-20 ENCOUNTER — APPOINTMENT (OUTPATIENT)
Dept: MAMMOGRAPHY | Facility: IMAGING CENTER | Age: 40
End: 2020-10-20
Payer: COMMERCIAL

## 2020-10-20 DIAGNOSIS — Z00.8 ENCOUNTER FOR OTHER GENERAL EXAMINATION: ICD-10-CM

## 2020-10-20 PROCEDURE — 77066 DX MAMMO INCL CAD BI: CPT

## 2020-10-20 PROCEDURE — 77066 DX MAMMO INCL CAD BI: CPT | Mod: 26

## 2020-10-20 PROCEDURE — 76641 ULTRASOUND BREAST COMPLETE: CPT | Mod: 26,50

## 2020-10-20 PROCEDURE — G0279: CPT

## 2020-10-20 PROCEDURE — G0279: CPT | Mod: 26

## 2020-10-20 PROCEDURE — 76641 ULTRASOUND BREAST COMPLETE: CPT

## 2020-10-25 ENCOUNTER — FORM ENCOUNTER (OUTPATIENT)
Age: 40
End: 2020-10-25

## 2020-11-24 ENCOUNTER — FORM ENCOUNTER (OUTPATIENT)
Age: 40
End: 2020-11-24

## 2020-11-25 ENCOUNTER — RESULT REVIEW (OUTPATIENT)
Age: 40
End: 2020-11-25

## 2020-11-25 ENCOUNTER — APPOINTMENT (OUTPATIENT)
Dept: OBGYN | Facility: CLINIC | Age: 40
End: 2020-11-25
Payer: COMMERCIAL

## 2020-11-25 VITALS
BODY MASS INDEX: 26.63 KG/M2 | SYSTOLIC BLOOD PRESSURE: 120 MMHG | WEIGHT: 156 LBS | HEIGHT: 64 IN | DIASTOLIC BLOOD PRESSURE: 74 MMHG

## 2020-11-25 PROCEDURE — 99395 PREV VISIT EST AGE 18-39: CPT

## 2020-11-25 PROCEDURE — 36415 COLL VENOUS BLD VENIPUNCTURE: CPT

## 2020-12-15 NOTE — PATIENT PROFILE OB - HOW PATIENT ADDRESSED, OB PROFILE
[facility] Intake/Output Summary (Last 24 hours) at 12/15/2020 0900  Last data filed at 12/15/2020 0900  Gross per 24 hour   Intake 1000 ml   Output --   Net 1000 ml

## 2021-04-06 ENCOUNTER — FORM ENCOUNTER (OUTPATIENT)
Age: 41
End: 2021-04-06

## 2021-11-12 ENCOUNTER — NON-APPOINTMENT (OUTPATIENT)
Age: 41
End: 2021-11-12

## 2021-11-12 DIAGNOSIS — Z78.9 OTHER SPECIFIED HEALTH STATUS: ICD-10-CM

## 2021-11-12 DIAGNOSIS — N91.2 AMENORRHEA, UNSPECIFIED: ICD-10-CM

## 2021-11-12 DIAGNOSIS — Z80.3 FAMILY HISTORY OF MALIGNANT NEOPLASM OF BREAST: ICD-10-CM

## 2021-11-12 DIAGNOSIS — Z80.0 FAMILY HISTORY OF MALIGNANT NEOPLASM OF DIGESTIVE ORGANS: ICD-10-CM

## 2021-12-13 DIAGNOSIS — M79.629 PAIN IN UNSPECIFIED UPPER ARM: ICD-10-CM

## 2021-12-13 DIAGNOSIS — Z92.89 PERSONAL HISTORY OF OTHER MEDICAL TREATMENT: ICD-10-CM

## 2021-12-13 DIAGNOSIS — Z78.9 OTHER SPECIFIED HEALTH STATUS: ICD-10-CM

## 2022-01-26 ENCOUNTER — APPOINTMENT (OUTPATIENT)
Dept: OBGYN | Facility: CLINIC | Age: 42
End: 2022-01-26
Payer: COMMERCIAL

## 2022-01-26 VITALS
WEIGHT: 165 LBS | BODY MASS INDEX: 28.17 KG/M2 | HEIGHT: 64 IN | SYSTOLIC BLOOD PRESSURE: 122 MMHG | HEART RATE: 88 BPM | DIASTOLIC BLOOD PRESSURE: 79 MMHG

## 2022-01-26 DIAGNOSIS — Z11.51 ENCOUNTER FOR SCREENING FOR HUMAN PAPILLOMAVIRUS (HPV): ICD-10-CM

## 2022-01-26 PROCEDURE — 99395 PREV VISIT EST AGE 18-39: CPT

## 2022-01-26 NOTE — PLAN
[FreeTextEntry1] : 40 y/o  LMP 22 female presents for annual wellness exam.\par \par -Discussed and reviewed importance of monthly BSE\par -Pap done today\par -Mammo UTD\par -f/u PCP for recommended HCM, vaccinations and CA screening\par -yearly mammo starting age 42\par -RTO 1 year

## 2022-01-26 NOTE — HISTORY OF PRESENT ILLNESS
[FreeTextEntry1] : 40 y/o  LMP 22 female presents for annual wellness exam. Last seen in 2020, negative pap and HPV. Pt had baseline mammo in 10/2020.  Pt expresses no desire to get pregnant and is currently tracking ovulation for contraception. Pt has never been on OCPs, and does not express interests in starting birth control or getting an IUD. Pt is working from home.\par \par ObHx: NSVDx1, CDx1 (2018, breech)\par NKDA

## 2022-01-28 LAB — HPV HIGH+LOW RISK DNA PNL CVX: NOT DETECTED

## 2022-02-04 LAB — CYTOLOGY CVX/VAG DOC THIN PREP: NORMAL

## 2023-01-13 ENCOUNTER — APPOINTMENT (OUTPATIENT)
Dept: ULTRASOUND IMAGING | Facility: IMAGING CENTER | Age: 43
End: 2023-01-13
Payer: COMMERCIAL

## 2023-01-13 ENCOUNTER — APPOINTMENT (OUTPATIENT)
Dept: MAMMOGRAPHY | Facility: IMAGING CENTER | Age: 43
End: 2023-01-13
Payer: COMMERCIAL

## 2023-01-13 ENCOUNTER — OUTPATIENT (OUTPATIENT)
Dept: OUTPATIENT SERVICES | Facility: HOSPITAL | Age: 43
LOS: 1 days | End: 2023-01-13
Payer: COMMERCIAL

## 2023-01-13 DIAGNOSIS — Z00.8 ENCOUNTER FOR OTHER GENERAL EXAMINATION: ICD-10-CM

## 2023-01-13 PROCEDURE — 77063 BREAST TOMOSYNTHESIS BI: CPT | Mod: 26

## 2023-01-13 PROCEDURE — 76641 ULTRASOUND BREAST COMPLETE: CPT

## 2023-01-13 PROCEDURE — 77067 SCR MAMMO BI INCL CAD: CPT

## 2023-01-13 PROCEDURE — 77067 SCR MAMMO BI INCL CAD: CPT | Mod: 26

## 2023-01-13 PROCEDURE — 76641 ULTRASOUND BREAST COMPLETE: CPT | Mod: 26,50

## 2023-01-13 PROCEDURE — 77063 BREAST TOMOSYNTHESIS BI: CPT

## 2023-01-17 ENCOUNTER — APPOINTMENT (OUTPATIENT)
Dept: MAMMOGRAPHY | Facility: IMAGING CENTER | Age: 43
End: 2023-01-17
Payer: COMMERCIAL

## 2023-01-17 ENCOUNTER — OUTPATIENT (OUTPATIENT)
Dept: OUTPATIENT SERVICES | Facility: HOSPITAL | Age: 43
LOS: 1 days | End: 2023-01-17
Payer: COMMERCIAL

## 2023-01-17 DIAGNOSIS — Z00.8 ENCOUNTER FOR OTHER GENERAL EXAMINATION: ICD-10-CM

## 2023-01-17 PROCEDURE — 77065 DX MAMMO INCL CAD UNI: CPT | Mod: 26,RT

## 2023-01-17 PROCEDURE — G0279: CPT | Mod: 26

## 2023-01-17 PROCEDURE — 77065 DX MAMMO INCL CAD UNI: CPT

## 2023-01-17 PROCEDURE — G0279: CPT

## 2023-01-19 ENCOUNTER — OUTPATIENT (OUTPATIENT)
Dept: OUTPATIENT SERVICES | Facility: HOSPITAL | Age: 43
LOS: 1 days | End: 2023-01-19
Payer: COMMERCIAL

## 2023-01-19 ENCOUNTER — RESULT REVIEW (OUTPATIENT)
Age: 43
End: 2023-01-19

## 2023-01-19 ENCOUNTER — APPOINTMENT (OUTPATIENT)
Dept: MAMMOGRAPHY | Facility: CLINIC | Age: 43
End: 2023-01-19
Payer: COMMERCIAL

## 2023-01-19 DIAGNOSIS — R92.1 MAMMOGRAPHIC CALCIFICATION FOUND ON DIAGNOSTIC IMAGING OF BREAST: ICD-10-CM

## 2023-01-19 PROCEDURE — A4648: CPT

## 2023-01-19 PROCEDURE — 77065 DX MAMMO INCL CAD UNI: CPT | Mod: 26,RT

## 2023-01-19 PROCEDURE — 19081 BX BREAST 1ST LESION STRTCTC: CPT

## 2023-01-19 PROCEDURE — 88360 TUMOR IMMUNOHISTOCHEM/MANUAL: CPT | Mod: 26

## 2023-01-19 PROCEDURE — 88360 TUMOR IMMUNOHISTOCHEM/MANUAL: CPT

## 2023-01-19 PROCEDURE — 77065 DX MAMMO INCL CAD UNI: CPT

## 2023-01-19 PROCEDURE — 88305 TISSUE EXAM BY PATHOLOGIST: CPT | Mod: 26

## 2023-01-19 PROCEDURE — 88341 IMHCHEM/IMCYTCHM EA ADD ANTB: CPT

## 2023-01-19 PROCEDURE — 88342 IMHCHEM/IMCYTCHM 1ST ANTB: CPT | Mod: 26

## 2023-01-19 PROCEDURE — 19081 BX BREAST 1ST LESION STRTCTC: CPT | Mod: RT

## 2023-01-19 PROCEDURE — 88305 TISSUE EXAM BY PATHOLOGIST: CPT

## 2023-01-26 ENCOUNTER — NON-APPOINTMENT (OUTPATIENT)
Age: 43
End: 2023-01-26

## 2023-01-27 ENCOUNTER — NON-APPOINTMENT (OUTPATIENT)
Age: 43
End: 2023-01-27

## 2023-01-31 ENCOUNTER — OUTPATIENT (OUTPATIENT)
Dept: OUTPATIENT SERVICES | Facility: HOSPITAL | Age: 43
LOS: 1 days | End: 2023-01-31
Payer: COMMERCIAL

## 2023-01-31 ENCOUNTER — APPOINTMENT (OUTPATIENT)
Dept: MRI IMAGING | Facility: CLINIC | Age: 43
End: 2023-01-31
Payer: COMMERCIAL

## 2023-01-31 DIAGNOSIS — D05.91 UNSPECIFIED TYPE OF CARCINOMA IN SITU OF RIGHT BREAST: ICD-10-CM

## 2023-01-31 PROCEDURE — 77049 MRI BREAST C-+ W/CAD BI: CPT | Mod: 26

## 2023-01-31 PROCEDURE — C8937: CPT

## 2023-01-31 PROCEDURE — A9585: CPT

## 2023-01-31 PROCEDURE — C8908: CPT

## 2023-02-02 ENCOUNTER — NON-APPOINTMENT (OUTPATIENT)
Age: 43
End: 2023-02-02

## 2023-02-02 ENCOUNTER — APPOINTMENT (OUTPATIENT)
Dept: SURGICAL ONCOLOGY | Facility: CLINIC | Age: 43
End: 2023-02-02
Payer: COMMERCIAL

## 2023-02-02 ENCOUNTER — APPOINTMENT (OUTPATIENT)
Dept: SURGICAL ONCOLOGY | Facility: CLINIC | Age: 43
End: 2023-02-02

## 2023-02-02 VITALS
BODY MASS INDEX: 26.63 KG/M2 | WEIGHT: 156 LBS | DIASTOLIC BLOOD PRESSURE: 83 MMHG | HEIGHT: 64 IN | RESPIRATION RATE: 16 BRPM | SYSTOLIC BLOOD PRESSURE: 130 MMHG | HEART RATE: 102 BPM | OXYGEN SATURATION: 98 %

## 2023-02-02 DIAGNOSIS — R92.8 OTHER ABNORMAL AND INCONCLUSIVE FINDINGS ON DIAGNOSTIC IMAGING OF BREAST: ICD-10-CM

## 2023-02-02 PROCEDURE — 99205 OFFICE O/P NEW HI 60 MIN: CPT

## 2023-02-03 ENCOUNTER — APPOINTMENT (OUTPATIENT)
Dept: OBGYN | Facility: CLINIC | Age: 43
End: 2023-02-03
Payer: COMMERCIAL

## 2023-02-03 ENCOUNTER — ASOB RESULT (OUTPATIENT)
Age: 43
End: 2023-02-03

## 2023-02-03 VITALS — DIASTOLIC BLOOD PRESSURE: 68 MMHG | SYSTOLIC BLOOD PRESSURE: 93 MMHG

## 2023-02-03 DIAGNOSIS — Z01.419 ENCOUNTER FOR GYNECOLOGICAL EXAMINATION (GENERAL) (ROUTINE) W/OUT ABNORMAL FINDINGS: ICD-10-CM

## 2023-02-03 PROCEDURE — 99396 PREV VISIT EST AGE 40-64: CPT

## 2023-02-03 PROCEDURE — 76830 TRANSVAGINAL US NON-OB: CPT

## 2023-02-03 NOTE — PROCEDURE
[Cervical Pap Smear] : cervical Pap smear [Liquid Base] : liquid base [Tolerated Well] : the patient tolerated the procedure well [No Complications] : there were no complications [Transvaginal Ultrasound] : transvaginal ultrasound [FreeTextEntry9] : recent diagnosis of breast cancer [FreeTextEntry4] : Left ovary corpus luteum cyst measuring 2.3 cm. Normal right ovary

## 2023-02-03 NOTE — END OF VISIT
[FreeTextEntry2] : I, Tracy Franks, acted as a scribe on behalf of Dr. Brandy Marquez on 02/03/2023 .\par \par All medical entries made by the scribe were at my, Dr. Brandy Marquez, direction and personally dictated by me on 02/03/2023. I have reviewed the chart and agree that the record accurately reflects my personal performance of the history, physical exam, assessment and plan. I have also personally directed, reviewed, and agreed with the chart.\par

## 2023-02-03 NOTE — PLAN
[FreeTextEntry1] : 43 y/o presents for routine annual GYN exam. \par \par -PAP/HPV\par -Pelvic US done today, normal\par -continue f/u with breast surgeon\par -BSE\par -RTO in 1 year

## 2023-02-06 ENCOUNTER — APPOINTMENT (OUTPATIENT)
Dept: ORTHOPEDIC SURGERY | Facility: CLINIC | Age: 43
End: 2023-02-06

## 2023-02-07 ENCOUNTER — APPOINTMENT (OUTPATIENT)
Dept: BREAST CENTER | Facility: CLINIC | Age: 43
End: 2023-02-07

## 2023-02-11 ENCOUNTER — TRANSCRIPTION ENCOUNTER (OUTPATIENT)
Age: 43
End: 2023-02-11

## 2023-02-11 LAB
CYTOLOGY CVX/VAG DOC THIN PREP: NORMAL
HPV HIGH+LOW RISK DNA PNL CVX: NOT DETECTED

## 2023-02-13 ENCOUNTER — OUTPATIENT (OUTPATIENT)
Dept: OUTPATIENT SERVICES | Facility: HOSPITAL | Age: 43
LOS: 1 days | End: 2023-02-13

## 2023-02-13 VITALS
TEMPERATURE: 97 F | RESPIRATION RATE: 16 BRPM | OXYGEN SATURATION: 99 % | HEART RATE: 79 BPM | SYSTOLIC BLOOD PRESSURE: 106 MMHG | WEIGHT: 156.97 LBS | HEIGHT: 64.5 IN | DIASTOLIC BLOOD PRESSURE: 62 MMHG

## 2023-02-13 DIAGNOSIS — D05.11 INTRADUCTAL CARCINOMA IN SITU OF RIGHT BREAST: ICD-10-CM

## 2023-02-13 DIAGNOSIS — Z98.891 HISTORY OF UTERINE SCAR FROM PREVIOUS SURGERY: Chronic | ICD-10-CM

## 2023-02-13 LAB
ANION GAP SERPL CALC-SCNC: 10 MMOL/L — SIGNIFICANT CHANGE UP (ref 7–14)
BUN SERPL-MCNC: 14 MG/DL — SIGNIFICANT CHANGE UP (ref 7–23)
CALCIUM SERPL-MCNC: 9 MG/DL — SIGNIFICANT CHANGE UP (ref 8.4–10.5)
CHLORIDE SERPL-SCNC: 105 MMOL/L — SIGNIFICANT CHANGE UP (ref 98–107)
CO2 SERPL-SCNC: 24 MMOL/L — SIGNIFICANT CHANGE UP (ref 22–31)
CREAT SERPL-MCNC: 0.71 MG/DL — SIGNIFICANT CHANGE UP (ref 0.5–1.3)
EGFR: 110 ML/MIN/1.73M2 — SIGNIFICANT CHANGE UP
GLUCOSE SERPL-MCNC: 86 MG/DL — SIGNIFICANT CHANGE UP (ref 70–99)
HCG SERPL-ACNC: <5 MIU/ML — SIGNIFICANT CHANGE UP
HCT VFR BLD CALC: 39.4 % — SIGNIFICANT CHANGE UP (ref 34.5–45)
HGB BLD-MCNC: 12.7 G/DL — SIGNIFICANT CHANGE UP (ref 11.5–15.5)
MCHC RBC-ENTMCNC: 29.5 PG — SIGNIFICANT CHANGE UP (ref 27–34)
MCHC RBC-ENTMCNC: 32.2 GM/DL — SIGNIFICANT CHANGE UP (ref 32–36)
MCV RBC AUTO: 91.6 FL — SIGNIFICANT CHANGE UP (ref 80–100)
NRBC # BLD: 0 /100 WBCS — SIGNIFICANT CHANGE UP (ref 0–0)
NRBC # FLD: 0 K/UL — SIGNIFICANT CHANGE UP (ref 0–0)
PLATELET # BLD AUTO: 223 K/UL — SIGNIFICANT CHANGE UP (ref 150–400)
POTASSIUM SERPL-MCNC: 3.9 MMOL/L — SIGNIFICANT CHANGE UP (ref 3.5–5.3)
POTASSIUM SERPL-SCNC: 3.9 MMOL/L — SIGNIFICANT CHANGE UP (ref 3.5–5.3)
RBC # BLD: 4.3 M/UL — SIGNIFICANT CHANGE UP (ref 3.8–5.2)
RBC # FLD: 12.7 % — SIGNIFICANT CHANGE UP (ref 10.3–14.5)
SODIUM SERPL-SCNC: 139 MMOL/L — SIGNIFICANT CHANGE UP (ref 135–145)
WBC # BLD: 5.57 K/UL — SIGNIFICANT CHANGE UP (ref 3.8–10.5)
WBC # FLD AUTO: 5.57 K/UL — SIGNIFICANT CHANGE UP (ref 3.8–10.5)

## 2023-02-13 NOTE — H&P PST ADULT - NSICDXFAMILYHX_GEN_ALL_CORE_FT
FAMILY HISTORY:  Father  Still living? No  FH: lung cancer, Age at diagnosis: Age Unknown  FHx: esophageal cancer, Age at diagnosis: Age Unknown    Mother  Still living? Unknown  FH: lung cancer, Age at diagnosis: Age Unknown

## 2023-02-13 NOTE — H&P PST ADULT - HISTORY OF PRESENT ILLNESS
Patient is a 42 year old presented to [facility] proep dx of breast Ca. 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 Patient is scheduled for right rosmery localized bracketed partial lumpectomy possible tissue transfer

## 2023-02-13 NOTE — H&P PST ADULT - NSWEIGHTCALCTOOLDRUG_GEN_A_CORE
Cardiology Progress Note - Verito Martinez 56 y o  male MRN: [id number]    Unit/Bed#: [facility] 631-01 Encounter: [id number]      Assessment:  Principal Problem:    COPD with exacerbation ([facility] )  Active Problems:    Colitis    Coronary artery disease involving native coronary artery of native heart    Leukocytosis    Dyslipidemia    Acute kidney failure (HCC)    Alcohol abuse    Tobacco abuse    Alcohol dependence with withdrawal (HCC)    Acute respiratory insufficiency    Agitation    Heart failure, systolic, due to idiopathic cardiomyopathy (HCC)    Presence of ileostomy ([facility])    Moderate protein-calorie malnutrition (HCC)    Anemia    Hypocalcemia    Perforation of colon ([facility] )      Plan:    He has no chest pain  He is in sinus rhythm on telemetry  He does appear to have mild confusion  He did have some respiratory difficulty which seems improved at the present time  He continues on amiodarone and metoprolol  His BMP today is stable  I have made no change in his medical regimen  He is postop day four after bowel surgery  Subjective:   Patient seen and examined  No significant events overnight   negative  Objective:     Vitals: Blood pressure 139/87, pulse (!) 109, temperature 98 4 °F (36 9 °C), temperature source Oral, resp  rate 22, height 5' 6" (1 676 m), weight 85 8 kg (189 lb 2 5 oz), SpO2 91 % , Body mass index is 30 53 kg/m² , Orthostatic Blood Pressures      Most Recent Value   Blood Pressure  139/87 filed at 05/26/2018 0126   Patient Position - Orthostatic VS  Lying filed at 05/25/2018 1900      ,      Intake/Output Summary (Last 24 hours) at 05/26/18 0722  Last data filed at 05/26/18 0558   Gross per 24 hour   Intake              738 ml   Output             2431 ml   Net            -1693 ml       No significant arrhythmias seen on telemetry review   Intermittent sinus tachycardia      Physical Exam:    GEN: Verito Martinez  NECK: supple, no carotid bruits, no JVD or HJR  HEART: normal rate, regular rhythm, normal S1 and S2, no murmurs, clicks, gallops or rubs   LUNGS:  Reduced breath sounds  ABDOMEN: normal bowel sounds, soft, no tenderness, no distention  EXTREMITIES: peripheral pulses normal; no clubbing, cyanosis, or edema  SKIN: warm and well perfused, no suspicious lesions on exposed skin    Labs & Results:    Admission on 05/15/2018   No results displayed because visit has over 200 results  Ct Chest Abdomen Pelvis Wo Contrast    Result Date: 5/15/2018  Narrative: CT CHEST, ABDOMEN AND PELVIS WITHOUT IV CONTRAST INDICATION:   sob  Malaise, weakness, incontinence COMPARISON: 12/7/2016 TECHNIQUE: CT examination of the chest, abdomen and pelvis was performed without intravenous contrast   Axial, sagittal, and coronal 2D reformatted images were created from the source data and submitted for interpretation  Radiation dose length product (DLP) for this visit:  727 44 mGy-cm   This examination, like all CT scans performed in the [facility], was performed utilizing techniques to minimize radiation dose exposure, including the use of iterative  reconstruction and automated exposure control  Enteric contrast was not administered  FINDINGS: CHEST LUNGS:  Subtle increased ground glass and nodular densities right lung apex may be inflammatory/infectious  3 mm right lower lung nodular density series 3 image 39, is unchanged  Additional scattered tiny nodular densities bilaterally appear similar  There is no tracheal or endobronchial lesion  PLEURA:  Unremarkable  HEART/GREAT VESSELS:  Unremarkable for patient's age  Coronary artery calcifications  MEDIASTINUM AND ADRIANNA:  Unremarkable  CHEST WALL AND LOWER NECK:   Unremarkable  ABDOMEN LIVER/BILIARY TREE:  Hepatic steatosis  GALLBLADDER:  Tiny layering calcified gallstone  No pericholecystic inflammatory change  SPLEEN:  Unremarkable  PANCREAS:  Unremarkable  ADRENAL GLANDS:  Unremarkable   KIDNEYS/URETERS:  Nonobstructing renal calculi bilaterally measuring up to 6 mm  No hydronephrosis  Stable probable proteinaceous cyst left upper kidney measuring 2 x 1 6 cm  STOMACH AND BOWEL:  Although exam is limited without intravenous contrast, there appears to be wall thickening centered around the sutures in the hepatic flexure of the colon  There is associated inflammatory change and infiltration of the adjacent fat  There may be some involvement of the adjacent descending duodenum  Findings may be related to the history of Crohn's disease/ulcerative colitis  Occult tumor not excluded at this time  No bowel obstruction  APPENDIX:  No findings to suggest appendicitis  ABDOMINOPELVIC CAVITY:  No ascites or free intraperitoneal air  No lymphadenopathy  VESSELS:  Unremarkable for patient's age  PELVIS REPRODUCTIVE ORGANS:  Unremarkable for patient's age  Prostate calcifications  URINARY BLADDER:  Unremarkable  ABDOMINAL WALL/INGUINAL REGIONS:  Unremarkable  OSSEOUS STRUCTURES:  No acute fracture or destructive osseous lesion  Impression: 1  Although exam is limited without intravenous contrast, there appears to be wall thickening centered around the sutures in the hepatic flexure of the colon, with associated inflammatory change and infiltration of the adjacent fat  There may be some involvement of the adjacent descending duodenum  Findings may be related to the history of Crohn's disease/ulcerative colitis  Occult tumor not excluded at this time  Follow-up to resolution recommended  2   Subtle increased ground glass and nodular densities right lung apex may be inflammatory/infectious  CT follow-up in 3-6 months may be considered to ensure resolution  Additional tiny nodular densities bilaterally appear similar  3   Nonobstructing renal calculi  Workstation performed: [id number]     Xr Chest Portable    Result Date: 5/23/2018  Narrative: CHEST INDICATION:   Intubated   COMPARISON:  Chest 5/22/2018 EXAM PERFORMED/VIEWS:  XR CHEST PORTABLE FINDINGS:  Endotracheal tube is present, in satisfactory position with its tip above the level of the amy  Enteric tube is present with its tip extending below the left hemidiaphragm  Cardiomediastinal silhouette appears unremarkable  There is a persistent left basilar opacity and blunting of left costophrenic angle  No additional focal consolidation or pneumothorax  Persistent pulmonary vascular congestion  Osseous structures appear within normal limits for patient age  Impression: 1  Persistent left basilar opacity compatible with effusion and a component of atelectasis or infection  2   Pulmonary vascular congestion  Workstation performed: [id number]     Xr Chest Portable    Result Date: 5/22/2018  Narrative: CHEST INDICATION:   Status post intubation  COMPARISON:  5/22/2018 EXAM PERFORMED/VIEWS:  XR CHEST PORTABLE FINDINGS:  Endotracheal tube is present, in satisfactory position with its tip above the level of the amy  Enteric tube is present with its tip extending below the left hemidiaphragm  Cardiomediastinal silhouette appears unremarkable  There is increased left basilar opacity compatible with small pleural effusion and probable left basilar consolidation on the basis of atelectasis or pneumonia  There is diffusely increased interstitial prominence system with fluid overload  No pneumothorax  Osseous structures appear within normal limits for patient age  Impression: Endotracheal tube in satisfactory position  Vascular congestion  Small left pleural effusion with probable left basilar consolidation due to atelectasis or pneumonia  Workstation performed: [id number]     Xr Chest Portable    Result Date: 5/19/2018  Narrative: CHEST INDICATION:   post intubation  COMPARISON:  5/19/2018 EXAM PERFORMED/VIEWS:  XR CHEST PORTABLE FINDINGS:  ET tube tip is approximately 2 5 cm above the amy  NG tube tip below the diaphragm  Cardiomediastinal silhouette appears unremarkable  Interval increased interstitial and hazy airspace densities bilaterally  Osseous structures appear within normal limits for patient age  Impression: Interval increased interstitial and hazy airspace densities bilaterally  Workstation performed: [id number]     Xr Chest Portable    Result Date: 5/19/2018  Narrative: CHEST INDICATION:   severe respiratory distress r/o Pneumothorax  COMPARISON:  5/19/2018 EXAM PERFORMED/VIEWS:  XR CHEST PORTABLE FINDINGS: Heart shadow is enlarged but unchanged from prior exam  The lungs are clear  No pneumothorax or pleural effusion  Osseous structures appear within normal limits for patient age  Impression: No acute cardiopulmonary disease  Workstation performed: [id number]     Xr Chest Portable    Result Date: 5/19/2018  Narrative: CHEST INDICATION:   hypoxia  COMPARISON:  5/17/2018 EXAM PERFORMED/VIEWS:  XR CHEST PORTABLE FINDINGS: The cardiac silhouette is without change from the prior study  No acute pulmonary disease, no pneumothorax or pleural effusion  Osseous structures appear within normal limits for patient age  Impression: No change from 5/17/2018 Workstation performed: [id number]     Xr Chest Portable    Result Date: 5/18/2018  Narrative: CHEST INDICATION:   hypoxia  COMPARISON:  5/16/2018 EXAM PERFORMED/VIEWS:  XR CHEST PORTABLE FINDINGS: Cardiomediastinal silhouette appears unremarkable  The lungs are clear  No pneumothorax or pleural effusion  Osseous structures appear within normal limits for patient age  Impression: No acute cardiopulmonary disease  Workstation performed: [id number]     Xr Chest Portable    Result Date: 5/16/2018  Narrative: CHEST INDICATION:   Shortness of breath  COMPARISON:  5/15/2018 EXAM PERFORMED/VIEWS:  XR CHEST PORTABLE FINDINGS: The cardiac silhouette is without change from the prior study  A very small left effusion is suspected  Bibasilar atelectasis noted  No pneumothorax or pulmonary edema   Osseous structures appear within normal limits for patient age  Impression: Very small left effusion with bibasilar subsegmental atelectasis Workstation performed: [id number]     Xr Chest Portable    Result Date: 5/15/2018  Narrative: CHEST INDICATION: 72-year-old male, shortness of breath COMPARISON: 12/7/2016 chest x-ray EXAM PERFORMED/VIEWS:  XR CHEST PORTABLE Single image FINDINGS: Cardiomediastinal silhouette appears unremarkable  The lungs are clear  No pneumothorax or pleural effusion  Osseous structures appear within normal limits for patient age  Impression: No acute cardiopulmonary disease  Findings are stable  Findings are consistent with emergency provider's preliminary reading Workstation performed: [id number]     Xr Abdomen 1 Vw Portable    Result Date: 5/22/2018  Narrative: ABDOMEN INDICATION: Instrument miscount in OR  COMPARISON:  10/23/2016 VIEWS:  AP supine FINDINGS: There are surgical drain projecting over the left lateral abdomen and right lower quadrant  No metallic surgical instruments identified  Partially imaged enterogastric feeding tube in the left upper quadrant  There is a nonspecific bowel gas pattern  Visualized osseous structures are unremarkable for the patient's age  Impression: No metallic surgical instrument identified  Workstation performed: [id number]     Xr Chest Portable Icu    Result Date: 5/22/2018  Narrative: CHEST INDICATION:   f/u resp failure  COMPARISON:  Chest x-ray from 5/21/2018  EXAM PERFORMED/VIEWS:  XR CHEST PORTABLE ICU FINDINGS:  Endotracheal tube and nasogastric tube have been removed  Cardiomediastinal silhouette appears unremarkable  The lungs are clear  No pneumothorax or pleural effusion  Osseous structures appear within normal limits for patient age  There is free intraperitoneal air  In retrospect, this was present on the 5/21/2018 chest x-ray, but was obscured by overlying EKG leads  Impression: There is free intraperitoneal air   I personally discussed this study with Diana Perez on 5/22/2018 at 8:51 AM  Workstation performed: [id number]     Xr Chest Portable Icu    Result Date: 5/21/2018  Narrative: CHEST INDICATION:   RESP FAILURE  COMPARISON:  5/20/2018 EXAM PERFORMED/VIEWS:  XR CHEST PORTABLE ICU FINDINGS: Cardiomegaly noted as before  Endotracheal and nasogastric tubes both in satisfactory position  Mild central congestion  No consolidation or pneumothorax  Small left basilar pleural effusion  Osseous structures appear within normal limits for patient age  Impression: Stable chest  Workstation performed: [id number]     Xr Chest Portable Icu    Result Date: 5/20/2018  Narrative: CHEST INDICATION:   Respiratory failure  COMPARISON:  May 19, 2018 EXAM PERFORMED/VIEWS:  XR CHEST PORTABLE ICU FINDINGS:  Endotracheal tube and nasogastric tube in place  Cardiomediastinal silhouette appears unremarkable  Patchy right infrahilar airspace opacity likely atelectasis  Stable from prior  Improved aeration in the left lung  Osseous structures appear within normal limits for patient age  Impression: Improved aeration left lung  Persistent right infrahilar opacity is likely atelectasis  Workstation performed: [id number]       EKG personally reviewed by Lawyer Abhinav MD      Counseling / Coordination of Care  Total floor / unit time spent today 30 minutes  Greater than 50% of total time was spent with the patient and / or family counseling and / or coordination of care   used

## 2023-02-13 NOTE — H&P PST ADULT - PROBLEM SELECTOR PLAN 1
Patient tentatively scheduled for Right Nika Localized bracketed partial lumpectomy possible tissue transfer on 2/27/23    Pre-op instructions provided. Pt given verbal and written instructions with teach back on chlorhexidine shampoo and Pepcid. Pt verbalized understanding with return demonstration.    Pt has a scheduled preop COVID test.

## 2023-02-13 NOTE — H&P PST ADULT - ATTENDING COMMENTS
Laterality was confirmed with the patient and marked accordingly.   H&P reviewed as above.  Risks  and benefits were discussed with the patient.  Informed consent was obtained.  Ok to proceed with scheduled surgery:  Right wire-localized partial mastectomy, possible tissue transfer.

## 2023-02-26 ENCOUNTER — TRANSCRIPTION ENCOUNTER (OUTPATIENT)
Age: 43
End: 2023-02-26

## 2023-02-27 ENCOUNTER — RESULT REVIEW (OUTPATIENT)
Age: 43
End: 2023-02-27

## 2023-02-27 ENCOUNTER — APPOINTMENT (OUTPATIENT)
Dept: MAMMOGRAPHY | Facility: IMAGING CENTER | Age: 43
End: 2023-02-27
Payer: COMMERCIAL

## 2023-02-27 ENCOUNTER — TRANSCRIPTION ENCOUNTER (OUTPATIENT)
Age: 43
End: 2023-02-27

## 2023-02-27 ENCOUNTER — APPOINTMENT (OUTPATIENT)
Dept: SURGICAL ONCOLOGY | Facility: AMBULATORY SURGERY CENTER | Age: 43
End: 2023-02-27

## 2023-02-27 ENCOUNTER — OUTPATIENT (OUTPATIENT)
Dept: OUTPATIENT SERVICES | Facility: HOSPITAL | Age: 43
LOS: 1 days | End: 2023-02-27
Payer: COMMERCIAL

## 2023-02-27 ENCOUNTER — OUTPATIENT (OUTPATIENT)
Dept: OUTPATIENT SERVICES | Facility: HOSPITAL | Age: 43
LOS: 1 days | Discharge: HOME | End: 2023-02-27
Payer: COMMERCIAL

## 2023-02-27 VITALS
WEIGHT: 156.97 LBS | HEIGHT: 64.5 IN | SYSTOLIC BLOOD PRESSURE: 106 MMHG | OXYGEN SATURATION: 100 % | TEMPERATURE: 98 F | DIASTOLIC BLOOD PRESSURE: 71 MMHG | RESPIRATION RATE: 15 BRPM | HEART RATE: 95 BPM

## 2023-02-27 VITALS
RESPIRATION RATE: 12 BRPM | SYSTOLIC BLOOD PRESSURE: 115 MMHG | OXYGEN SATURATION: 99 % | HEART RATE: 98 BPM | DIASTOLIC BLOOD PRESSURE: 66 MMHG

## 2023-02-27 DIAGNOSIS — D05.11 INTRADUCTAL CARCINOMA IN SITU OF RIGHT BREAST: ICD-10-CM

## 2023-02-27 DIAGNOSIS — Z98.891 HISTORY OF UTERINE SCAR FROM PREVIOUS SURGERY: Chronic | ICD-10-CM

## 2023-02-27 DIAGNOSIS — Z00.8 ENCOUNTER FOR OTHER GENERAL EXAMINATION: ICD-10-CM

## 2023-02-27 PROCEDURE — 19282 PERQ DEVICE BREAST EA IMAG: CPT

## 2023-02-27 PROCEDURE — 19282 PERQ DEVICE BREAST EA IMAG: CPT | Mod: 59,RT

## 2023-02-27 PROCEDURE — 76098 X-RAY EXAM SURGICAL SPECIMEN: CPT | Mod: 26

## 2023-02-27 PROCEDURE — 76098 X-RAY EXAM SURGICAL SPECIMEN: CPT

## 2023-02-27 PROCEDURE — 14301 TIS TRNFR ANY 30.1-60 SQ CM: CPT

## 2023-02-27 PROCEDURE — 19281 PERQ DEVICE BREAST 1ST IMAG: CPT

## 2023-02-27 PROCEDURE — 19301 PARTIAL MASTECTOMY: CPT | Mod: RT

## 2023-02-27 PROCEDURE — 88307 TISSUE EXAM BY PATHOLOGIST: CPT | Mod: 26

## 2023-02-27 PROCEDURE — 88305 TISSUE EXAM BY PATHOLOGIST: CPT | Mod: 26

## 2023-02-27 PROCEDURE — 19281 PERQ DEVICE BREAST 1ST IMAG: CPT | Mod: RT

## 2023-02-27 PROCEDURE — C1889: CPT

## 2023-02-27 PROCEDURE — 88360 TUMOR IMMUNOHISTOCHEM/MANUAL: CPT | Mod: 26

## 2023-02-27 PROCEDURE — C1769: CPT

## 2023-02-27 DEVICE — CLIP APPLIER ETHICON LIGACLIP 11.5" MEDIUM: Type: IMPLANTABLE DEVICE | Status: FUNCTIONAL

## 2023-02-27 RX ORDER — OXYCODONE HYDROCHLORIDE 5 MG/1
1 TABLET ORAL
Qty: 5 | Refills: 0
Start: 2023-02-27 | End: 2023-02-28

## 2023-02-27 RX ORDER — SODIUM CHLORIDE 9 MG/ML
1000 INJECTION, SOLUTION INTRAVENOUS
Refills: 0 | Status: DISCONTINUED | OUTPATIENT
Start: 2023-02-27 | End: 2023-03-13

## 2023-02-27 RX ORDER — OXYCODONE HYDROCHLORIDE 5 MG/1
5 TABLET ORAL
Qty: 5 | Refills: 0
Start: 2023-02-27

## 2023-02-27 NOTE — ASU DISCHARGE PLAN (ADULT/PEDIATRIC) - ASU DC SPECIAL INSTRUCTIONSFT
Please see discharge instruction sheet. Please see discharge instruction sheet.  tylenol given at 1100 AM next dose 5 PM

## 2023-02-27 NOTE — ASU DISCHARGE PLAN (ADULT/PEDIATRIC) - CARE PROVIDER_API CALL
Reyes, Sylvia A (MD)  Surgery  [address] [place] [address]  Phone: [phone]  Fax: [phone]  Follow Up Time: 2 weeks

## 2023-02-27 NOTE — ASU PREOP CHECKLIST - SKIN PREP
How Severe Is Your Skin Lesion?: severe Has Your Skin Lesion Been Treated?: not been treated Is This A New Presentation, Or A Follow-Up?: Growth n/a

## 2023-02-27 NOTE — BRIEF OPERATIVE NOTE - OPERATION/FINDINGS
Right breast mass at 6:00 removed - localized with Wire x3. Wires x2 localized calcifications both anterior and posterior to biopsy clip Right breast mass at 6:00 removed - localized with Wire x3. Wires localized the known cancer and calcifications both anterior and posterior to biopsy clip. Tissue transfer.

## 2023-02-27 NOTE — ASU DISCHARGE PLAN (ADULT/PEDIATRIC) - NS MD DC FALL RISK RISK
For information on Fall & Injury Prevention, visit: [email or web address] OR  [email or web address] OR  https://www.cdc.gov/steadi/patient.html

## 2023-03-06 ENCOUNTER — NON-APPOINTMENT (OUTPATIENT)
Age: 43
End: 2023-03-06

## 2023-03-07 LAB — SURGICAL PATHOLOGY STUDY: SIGNIFICANT CHANGE UP

## 2023-03-08 ENCOUNTER — NON-APPOINTMENT (OUTPATIENT)
Age: 43
End: 2023-03-08

## 2023-03-09 ENCOUNTER — APPOINTMENT (OUTPATIENT)
Dept: SURGICAL ONCOLOGY | Facility: CLINIC | Age: 43
End: 2023-03-09
Payer: COMMERCIAL

## 2023-03-09 PROCEDURE — 99214 OFFICE O/P EST MOD 30 MIN: CPT | Mod: 24

## 2023-03-09 NOTE — HISTORY OF PRESENT ILLNESS
[FreeTextEntry1] : SCOTTY ALANIZ is a 42 year F w biopsy proven Right Breast cancer (DCIS, LCIS) found on routine imaging. \par s/p Right breast Wire localized x3 partial mastectomy, Tissue transfer on 2/2/7/23 at [facility].   Final path upgrade to Invasive Ductal Carcinoma, 3.5mm, and DCIS.   \par [facility] Genetic panel Negative. \par \par Doing well POD 10.  Denies pain.  No fevers or chills.  No erythema.  \par

## 2023-03-09 NOTE — DATA REVIEWED
[FreeTextEntry1] : \par BREAST PATH/RAD REVIEW\par \par 10/20/2020 BL Dx MG/US: BIRADs 1, Heterogeneously dense \par 1/16/2023 BL SC MG/US: BIRADs 0, R slightly LOQ calc's \par 1/17/2023 R Dx MG: BIRADs 4B, R LOQ with 3 groupings of similar appearing calc's -> Rec R Stereo bx of middle grouping and lateral-most grouping, if bx is benign then rec 6 month f/u R Dx MG to confirm stability of other 2 groupings\par \par 1/19/23 R Stereo Bx (R Lower outer breast)\par - DCIS, intermediate nuclear grade, cribriform and micropapillary type with calc's and focal necrosis. (Top hat clip) ER: Positive ( 95%), [place]: Positive (90%), Concordant \par - Lobular carcinoma in situ (LCIS), classic type with calcs, Fibrocystic changes with Calc's\par - Note: additional clusters of faint calcifications seen both anterior and posterior to the site of biopsy spanning an entirety of up to 5 cm Anterior to posterior > Rec Breast MRI \par \par 1/31/23 Breast MRI: BIRADs 6, Biopsy proven malignancy in Right breast demonstrates no suspicious enhancement. No evidence for multifocal or multicentric disease. No lymphadenopathy \par \par ~~~~ Images were reviewed with breast radiology. Given the proximity of the 3 areas of calcifications, localization with rosmery will be suboptimal. Will proceed with wire localization bracketing on morning of surgery.\par \par ~ 2/27/23 s/p Right breast Wire localized x3 partial mastectomy, Tissue transfer at [facility] \par

## 2023-03-09 NOTE — ASSESSMENT
[FreeTextEntry1] : SCOTTY ALANIZ is a 42 year F with Right breast cancer,  dx January 2023 - ( R LOQ DCIS and LCIS, ER: positive 95%, [place]: Positive 90%), s/p Right breast Wire localized x3 partial mastectomy, Tissue transfer on 2/2/7/23, \par Final path with 3.5mm IDC, DCIS.  \par \par Scotty is doing well after surgery.  We discussed her surgical Pathology which confirmed well differentiated Invasive ductal carcinoma,3.5mm,  DCIS w/ intermediate grade nuclear atypia estimated to measure 1.8 cm  and LCIS classic and focal florid type.   We discussed that final margins are negative.  I reviewed her surgical pathology report in great detail and we discussed the high risk lesions that were prominent throughout her final margins.   We discussed that future surveillance will include an annual MRI and annual mammogram, given her high risk status.  After review of path, Scotty prefers to undergo bilateral mastectomy due to extent of non-invasive disease and the recent upgrade to invasive breast cancer.  We discussed the 1%/yr recurrence risk with invasive disease, however, future high risk screening in the setting of invasive disease would cause extreme anxiety for her and she prefers to proceed with mastectomy for her cancer treatment.   She is not a candidate for nipple sparing mastectomy.  We discussed expectations, risks and benefits of mastectomy.  \par \par - We discussed plastic surgery options for immediate reconstruction after mastectomy.  She will meet with Dr. Roland for consultation.  \par \par Surgical plan:   Bilateral skin sparing mastectomy, Right sentinel lymph node biopsy\par Date:  TBD\par \par \par \par

## 2023-03-09 NOTE — PHYSICAL EXAM
[Normal] : well developed, well nourished, in no acute distress [de-identified] : Right breast periareolar incision is well-approximated, no parenchymal defect, no evidence of infection, no erythema, no ecchymosis.   [de-identified] : Normal respiratory effort

## 2023-03-09 NOTE — REASON FOR VISIT
[de-identified] : Right breast Wire localized x3 partial mastectomy, Tissue transfer  [de-identified] : 2/27/2023 [de-identified] : 10 [de-identified] : 2

## 2023-03-10 ENCOUNTER — NON-APPOINTMENT (OUTPATIENT)
Age: 43
End: 2023-03-10

## 2023-03-15 ENCOUNTER — APPOINTMENT (OUTPATIENT)
Dept: PLASTIC SURGERY | Facility: CLINIC | Age: 43
End: 2023-03-15
Payer: COMMERCIAL

## 2023-03-15 VITALS
HEIGHT: 65 IN | HEART RATE: 83 BPM | TEMPERATURE: 97.2 F | RESPIRATION RATE: 2 BRPM | BODY MASS INDEX: 25.99 KG/M2 | SYSTOLIC BLOOD PRESSURE: 112 MMHG | WEIGHT: 156 LBS | OXYGEN SATURATION: 98 % | DIASTOLIC BLOOD PRESSURE: 77 MMHG

## 2023-03-15 DIAGNOSIS — Z80.1 FAMILY HISTORY OF MALIGNANT NEOPLASM OF TRACHEA, BRONCHUS AND LUNG: ICD-10-CM

## 2023-03-15 DIAGNOSIS — Z42.1 ENCOUNTER FOR BREAST RECONSTRUCTION FOLLOWING MASTECTOMY: ICD-10-CM

## 2023-03-15 PROCEDURE — 99205 OFFICE O/P NEW HI 60 MIN: CPT

## 2023-03-20 ENCOUNTER — APPOINTMENT (OUTPATIENT)
Dept: SURGICAL ONCOLOGY | Facility: CLINIC | Age: 43
End: 2023-03-20
Payer: COMMERCIAL

## 2023-03-20 PROCEDURE — 99213 OFFICE O/P EST LOW 20 MIN: CPT | Mod: 24,95

## 2023-03-28 ENCOUNTER — APPOINTMENT (OUTPATIENT)
Dept: SURGICAL ONCOLOGY | Facility: CLINIC | Age: 43
End: 2023-03-28
Payer: COMMERCIAL

## 2023-03-28 VITALS
WEIGHT: 156 LBS | BODY MASS INDEX: 25.99 KG/M2 | SYSTOLIC BLOOD PRESSURE: 121 MMHG | DIASTOLIC BLOOD PRESSURE: 76 MMHG | OXYGEN SATURATION: 98 % | HEART RATE: 84 BPM | HEIGHT: 65 IN | RESPIRATION RATE: 16 BRPM

## 2023-03-28 PROCEDURE — 99024 POSTOP FOLLOW-UP VISIT: CPT

## 2023-03-31 ENCOUNTER — OUTPATIENT (OUTPATIENT)
Dept: OUTPATIENT SERVICES | Facility: HOSPITAL | Age: 43
LOS: 1 days | End: 2023-03-31
Payer: COMMERCIAL

## 2023-03-31 VITALS
SYSTOLIC BLOOD PRESSURE: 114 MMHG | HEART RATE: 77 BPM | WEIGHT: 156.09 LBS | TEMPERATURE: 98 F | RESPIRATION RATE: 16 BRPM | OXYGEN SATURATION: 100 % | DIASTOLIC BLOOD PRESSURE: 80 MMHG | HEIGHT: 64 IN

## 2023-03-31 DIAGNOSIS — C50.911 MALIGNANT NEOPLASM OF UNSPECIFIED SITE OF RIGHT FEMALE BREAST: ICD-10-CM

## 2023-03-31 DIAGNOSIS — Z01.818 ENCOUNTER FOR OTHER PREPROCEDURAL EXAMINATION: ICD-10-CM

## 2023-03-31 DIAGNOSIS — Z98.890 OTHER SPECIFIED POSTPROCEDURAL STATES: Chronic | ICD-10-CM

## 2023-03-31 DIAGNOSIS — Z98.891 HISTORY OF UTERINE SCAR FROM PREVIOUS SURGERY: Chronic | ICD-10-CM

## 2023-03-31 LAB
HCG SERPL-ACNC: <1 MIU/ML — SIGNIFICANT CHANGE UP
SURGICAL PATHOLOGY STUDY: SIGNIFICANT CHANGE UP

## 2023-03-31 PROCEDURE — 84702 CHORIONIC GONADOTROPIN TEST: CPT

## 2023-03-31 PROCEDURE — 36415 COLL VENOUS BLD VENIPUNCTURE: CPT

## 2023-03-31 PROCEDURE — G0463: CPT

## 2023-03-31 NOTE — H&P PST ADULT - PROBLEM SELECTOR PLAN 1
pt scheduled for right axillary sentinel lymph node biopsy on 04/03/23  Preop instructions provided. Pt verbalized understanding.    written and verbal instructions with teach back on chlorhexidine shampoo provided,  pt verbalized understanding   CBC, BMP in chart, Hcg done @[facility]

## 2023-03-31 NOTE — H&P PST ADULT - ATTENDING COMMENTS
Laterality was confirmed with the patient and marked accordingly.   No new changes since H&P.   Risks and benefits of procedure were discussed with the patient, including but not limited to bleeding, infection, altered cosmesis, numbness, and possible need for additional surgery.  Informed consent was obtained.    OK to proceed with scheduled surgery.

## 2023-03-31 NOTE — H&P PST ADULT - LYMPHATICS COMMENTS
No cervical/supraclavicular lymphadenopathy palpated on exam. No cervical/supraclavicular, axillary lymphadenopathy palpated on exam.

## 2023-03-31 NOTE — H&P PST ADULT - LAST CARDIAC ANGIOGRAM/IMAGING
Anesthesia ROS/Med Hx        Cardiovascular Review:    Exercise tolerance: good    GI/HEPATIC/RENAL Review:    Pt. positive for GERD    End/Other Review:    Pt. positive for obesity class III - 40.00 - 49.99    Anesthesia Plan  ASA Status: 3  Anesthesia Type: General  Induction: Intravenous  Preferred Airway Type: ETT  Reviewed: Lab Results, Pre-Induction Reassessment, Patient Summary, Beta Blocker Status, NPO Status, Allergies, Medications and Past Med History  The proposed anesthetic plan, including its risks and benefits, have been discussed with the Patient - along with the risks and benefits of alternatives.  Questions were encouraged and answered and the patient and/or representative agrees to proceed.  Blood Products: Not Anticipated      Physical Exam  Mallampati: III  cardiovascular exam normal       denies

## 2023-03-31 NOTE — H&P PST ADULT - COMMENTS
activity: walking 30 min 3-4 x week, house chores, ADL, able to climb 1 flight of stairs w/o SOB  METS: >4

## 2023-03-31 NOTE — H&P PST ADULT - HISTORY OF PRESENT ILLNESS
42 year old presented to [facility] with preop dx of malignant neoplasm of unspecified site of right female breast. Patient is scheduled for right axillary sentinel lymph node biopsy 42 year old female with h/o malignant neoplasm of unspecified site of right female breast, s/p right breast lumpectomy, presents to [facility] for evaluation scheduled for right axillary sentinel lymph node biopsy

## 2023-03-31 NOTE — H&P PST ADULT - CONSTITUTIONAL
Patient crying in triage and won't answer any questions, seen at Urgent care about 1 week ago DX: anxiety
well-groomed/no distress

## 2023-03-31 NOTE — H&P PST ADULT - NSANTHOSAYNRD_GEN_A_CORE
neck 13.5 inches/No. DIEGO screening performed.  STOP BANG Legend: 0-2 = LOW Risk; 3-4 = INTERMEDIATE Risk; 5-8 = HIGH Risk

## 2023-03-31 NOTE — H&P PST ADULT - ASSESSMENT
42 y.o. female with preop diagnosis of malignant neoplasm of unspecified site of right female breast

## 2023-04-02 ENCOUNTER — TRANSCRIPTION ENCOUNTER (OUTPATIENT)
Age: 43
End: 2023-04-02

## 2023-04-03 ENCOUNTER — OUTPATIENT (OUTPATIENT)
Dept: OUTPATIENT SERVICES | Facility: HOSPITAL | Age: 43
LOS: 1 days | End: 2023-04-03
Payer: COMMERCIAL

## 2023-04-03 ENCOUNTER — TRANSCRIPTION ENCOUNTER (OUTPATIENT)
Age: 43
End: 2023-04-03

## 2023-04-03 ENCOUNTER — APPOINTMENT (OUTPATIENT)
Dept: SURGICAL ONCOLOGY | Facility: HOSPITAL | Age: 43
End: 2023-04-03

## 2023-04-03 ENCOUNTER — RESULT REVIEW (OUTPATIENT)
Age: 43
End: 2023-04-03

## 2023-04-03 VITALS
RESPIRATION RATE: 14 BRPM | OXYGEN SATURATION: 100 % | SYSTOLIC BLOOD PRESSURE: 117 MMHG | HEART RATE: 81 BPM | HEIGHT: 64 IN | TEMPERATURE: 97 F | DIASTOLIC BLOOD PRESSURE: 78 MMHG | WEIGHT: 156.53 LBS

## 2023-04-03 VITALS
RESPIRATION RATE: 16 BRPM | TEMPERATURE: 97 F | DIASTOLIC BLOOD PRESSURE: 73 MMHG | OXYGEN SATURATION: 98 % | HEART RATE: 87 BPM | SYSTOLIC BLOOD PRESSURE: 111 MMHG

## 2023-04-03 DIAGNOSIS — C50.911 MALIGNANT NEOPLASM OF UNSPECIFIED SITE OF RIGHT FEMALE BREAST: ICD-10-CM

## 2023-04-03 DIAGNOSIS — Z98.890 OTHER SPECIFIED POSTPROCEDURAL STATES: Chronic | ICD-10-CM

## 2023-04-03 DIAGNOSIS — Z98.891 HISTORY OF UTERINE SCAR FROM PREVIOUS SURGERY: Chronic | ICD-10-CM

## 2023-04-03 PROCEDURE — 88307 TISSUE EXAM BY PATHOLOGIST: CPT

## 2023-04-03 PROCEDURE — 38792 RA TRACER ID OF SENTINL NODE: CPT | Mod: 79,59,RT

## 2023-04-03 PROCEDURE — 38900 IO MAP OF SENT LYMPH NODE: CPT | Mod: RT

## 2023-04-03 PROCEDURE — 38525 BIOPSY/REMOVAL LYMPH NODES: CPT | Mod: RT

## 2023-04-03 PROCEDURE — A9541: CPT

## 2023-04-03 PROCEDURE — 38900 IO MAP OF SENT LYMPH NODE: CPT | Mod: 79,RT

## 2023-04-03 PROCEDURE — 38525 BIOPSY/REMOVAL LYMPH NODES: CPT | Mod: 79,RT

## 2023-04-03 PROCEDURE — 88307 TISSUE EXAM BY PATHOLOGIST: CPT | Mod: 26

## 2023-04-03 RX ORDER — ONDANSETRON 8 MG/1
4 TABLET, FILM COATED ORAL ONCE
Refills: 0 | Status: DISCONTINUED | OUTPATIENT
Start: 2023-04-03 | End: 2023-04-03

## 2023-04-03 RX ORDER — SODIUM CHLORIDE 9 MG/ML
1000 INJECTION, SOLUTION INTRAVENOUS
Refills: 0 | Status: DISCONTINUED | OUTPATIENT
Start: 2023-04-03 | End: 2023-04-03

## 2023-04-03 RX ORDER — HYDROMORPHONE HYDROCHLORIDE 2 MG/ML
0.5 INJECTION INTRAMUSCULAR; INTRAVENOUS; SUBCUTANEOUS
Refills: 0 | Status: DISCONTINUED | OUTPATIENT
Start: 2023-04-03 | End: 2023-04-03

## 2023-04-03 RX ADMIN — SODIUM CHLORIDE 50 MILLILITER(S): 9 INJECTION, SOLUTION INTRAVENOUS at 09:17

## 2023-04-03 NOTE — ASU PATIENT PROFILE, ADULT - MENTAL HEALTH CONDITIONS/SYMPTOMS, PROFILE
Bed: 07  Expected date: 7/1/17  Expected time: 12:46 AM  Means of arrival: [facility]  Comments:  Ingestion of midol and motrin, SI  25 y/o    none

## 2023-04-03 NOTE — ASU DISCHARGE PLAN (ADULT/PEDIATRIC) - CARE PROVIDER_API CALL
Reyes, Sylvia A (MD)  Surgery  [address] [place] [address]  Phone: [phone]  Fax: [phone]  Established Patient  Follow Up Time: 2 weeks

## 2023-04-03 NOTE — BRIEF OPERATIVE NOTE - NSICDXBRIEFPROCEDURE_GEN_ALL_CORE_FT
PROCEDURES:  Intraoperative sentinel node mapping 03-Apr-2023 12:05:18 Right Axilla Fernanda Luo  Biopsy or excision, deep lymph node, axillary 03-Apr-2023 12:05:52 Right axilla Fernanda Luo

## 2023-04-03 NOTE — ASU PATIENT PROFILE, ADULT - FALL HARM RISK - UNIVERSAL INTERVENTIONS
Bed in lowest position, wheels locked, appropriate side rails in place/Call bell, personal items and telephone in reach/Instruct patient to call for assistance before getting out of bed or chair/Non-slip footwear when patient is out of bed/[place] to call system/Physically safe environment - no spills, clutter or unnecessary equipment/Purposeful Proactive Rounding/Room/bathroom lighting operational, light cord in reach

## 2023-04-03 NOTE — BRIEF OPERATIVE NOTE - OPERATION/FINDINGS
Right Axilla injected with isosulfan blue and nuclear injection, localization of Right axillary [place] lymph node # 1 and # 2  (Both Reactive, Blue)

## 2023-04-03 NOTE — PRE-ANESTHESIA EVALUATION ADULT - NSANTHADDINFOFT_GEN_ALL_CORE
Discussed GA with LMA in detail with patient and all questions sought and answered. Pt. agrees to all plans and wishes to proceed with surgical care.

## 2023-04-03 NOTE — ASU DISCHARGE PLAN (ADULT/PEDIATRIC) - NS MD DC FALL RISK RISK
For information on Fall & Injury Prevention, visit: [email or web address] OR  [email or web address] OR  https://www.cdc.gov/steadi/patient.html

## 2023-04-09 NOTE — RESULTS/DATA
[FreeTextEntry1] : BREAST PATH/RAD REVIEW\par \par 10/20/2020 BL Dx MG/US: BIRADs 1, Heterogeneously dense \par 1/16/2023 BL SC MG/US: BIRADs 0, R slightly LOQ calc's \par 1/17/2023 R Dx MG: BIRADs 4B, R LOQ with 3 groupings of similar appearing calc's -> Rec R Stereo bx of middle grouping and lateral-most grouping, if bx is benign then rec 6 month f/u R Dx MG to confirm stability of other 2 groupings\par \par 1/19/23 R Stereo Bx (R Lower outer breast)\par - DCIS, intermediate nuclear grade, cribriform and micropapillary type with calc's and focal necrosis. (Top hat clip) ER: Positive ( 95%), VT: Positive (90%), Concordant \par - Lobular carcinoma in situ (LCIS), classic type with calcs, Fibrocystic changes with Calc's\par - Note: additional clusters of faint calcifications seen both anterior and posterior to the site of biopsy spanning an entirety of up to 5 cm Anterior to posterior > Rec Breast MRI \par \par 1/31/23 Breast MRI: BIRADs 6, Biopsy proven malignancy in Right breast demonstrates no suspicious enhancement. No evidence for multifocal or multicentric disease. No lymphadenopathy \par \par \par \par

## 2023-04-09 NOTE — HISTORY OF PRESENT ILLNESS
[de-identified] : SCOTTY ALANIZ is a 42 year F who presents for initial evaluation of Right Breast cancer (DCIS) found on routine imaging. \par \par Screen mammogram and US on 23 rated BIRADs 0, with repeat diagnostic mammo/US rated BIRADs 4B for 3 suspicious groupings of calcification in the lower outer right breast. 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 \par \par Referred by: Breast  \par GYN: Dr Brandy Marquez \par PCP: Dr Qi Graham  \par \par PMHx: Denies\par PSHx:  x1 (2018, breech) \par Meds: Multivitamin \par NKDA\par Family Hx: Breast Cancer ( Maternal Great aunt dx early 70s), No ovarian Cancer, Lung Cancer ( Mother dx 73), Lung and Cervical cancer ( maternal Grandmother dx 83), Esophageal and throat cancer ( Father) \par GYN: , menarche age 15, LMP 25. Age at first pregnancy 32.  Yes, 1-3 months .\par Has never been on OCPs \par Bra size: 34C \par 
English

## 2023-04-09 NOTE — PHYSICAL EXAM
[Normal] : normal breast inspection and palpation of axillas [Normal Neck Lymph Nodes] : normal neck lymph nodes  [Normal Supraclavicular Lymph Nodes] : normal supraclavicular lymph nodes [Normal Axillary Lymph Nodes] : normal axillary lymph nodes [Normal] : oriented to person, place and time, with appropriate affect [de-identified] : Normal respiratory effort [de-identified] : No palpable mass bilaterally.  No skin changes.  Bilateral normal nipple eversion.

## 2023-04-09 NOTE — ASSESSMENT
[FreeTextEntry1] : SCOTTY ALANIZ is a 42 year F with recently diagnosed Right breast cancer- ( R  LOQ DCIS and LCIS, ER: positive  95%, UT: Positive 90%)\par \par We had a lengthy discussion regarding the natural history of breast cancer,her pathology results, her breast Imaging, staging, surgical options and adjuvant treatment. Please see scanned progress noted for personalized care plan\par \par Breast MRI  Biopsy proven malignancy in Right breast demonstrates no additional suspicious enhancement. No evidence for multifocal or multicentric disease. No lymphadenopathy \par \par We also discussed additional clusters of faint calcification seen both anterior and posterior to the site of biopsy spanning an entirety of up to 5 cm (AP).  We discussed targeting the additional calcifications for a wide lumpectomy.  \par We discussed surgical options of wide lumpectomy vs mastectomy and the risks and benefits of each including but not limited to bleeding, infection, altered cosmesis, recovery time, possible need for reoperation, numbness, nerve or vessel injury, and recurrence risk.  She would like to proceed with wide lumpectomy.  \par \par \par Surgical Plan: Right rosmery localized wide lumpectomy (to be bracketed), possible tissue transfer\par Target Surgical Date: 2/27/23\par \par - Genetics to be drawn today in office\par - Preoperative planning: PST, COVID, Rosmery localization (to be bracketed) \par - RTO for post-operative visit \par \par \par ** Addendum/Update:   Images were reviewed with breast radiology.   Given the proximity of the 3 areas of calcifications, localization with rosmery will be suboptimal.  Will proceed with wire localization bracketing on morning of surgery.  This was discussed with Scotty who agrees to proceed with wire loc instead of rosmery.  \par  normal S1, S2 heard normal S1, S2 heard

## 2023-04-10 LAB — SURGICAL PATHOLOGY STUDY: SIGNIFICANT CHANGE UP

## 2023-04-13 ENCOUNTER — APPOINTMENT (OUTPATIENT)
Dept: SURGICAL ONCOLOGY | Facility: CLINIC | Age: 43
End: 2023-04-13
Payer: COMMERCIAL

## 2023-04-13 VITALS
DIASTOLIC BLOOD PRESSURE: 81 MMHG | RESPIRATION RATE: 15 BRPM | SYSTOLIC BLOOD PRESSURE: 119 MMHG | WEIGHT: 156 LBS | HEIGHT: 65 IN | BODY MASS INDEX: 25.99 KG/M2 | HEART RATE: 88 BPM | OXYGEN SATURATION: 99 %

## 2023-04-13 PROCEDURE — 99024 POSTOP FOLLOW-UP VISIT: CPT

## 2023-04-17 ENCOUNTER — OUTPATIENT (OUTPATIENT)
Dept: OUTPATIENT SERVICES | Facility: HOSPITAL | Age: 43
LOS: 1 days | Discharge: HOME | End: 2023-04-17
Payer: COMMERCIAL

## 2023-04-17 DIAGNOSIS — Z98.890 OTHER SPECIFIED POSTPROCEDURAL STATES: Chronic | ICD-10-CM

## 2023-04-17 DIAGNOSIS — Z98.891 HISTORY OF UTERINE SCAR FROM PREVIOUS SURGERY: Chronic | ICD-10-CM

## 2023-04-18 ENCOUNTER — OUTPATIENT (OUTPATIENT)
Dept: OUTPATIENT SERVICES | Facility: HOSPITAL | Age: 43
LOS: 1 days | Discharge: HOME | End: 2023-04-18

## 2023-04-18 DIAGNOSIS — E34.9 ENDOCRINE DISORDER, UNSPECIFIED: ICD-10-CM

## 2023-04-18 DIAGNOSIS — Z98.890 OTHER SPECIFIED POSTPROCEDURAL STATES: Chronic | ICD-10-CM

## 2023-04-18 DIAGNOSIS — Z98.891 HISTORY OF UTERINE SCAR FROM PREVIOUS SURGERY: Chronic | ICD-10-CM

## 2023-04-21 ENCOUNTER — APPOINTMENT (OUTPATIENT)
Dept: HEMATOLOGY ONCOLOGY | Facility: CLINIC | Age: 43
End: 2023-04-21
Payer: COMMERCIAL

## 2023-04-21 ENCOUNTER — NON-APPOINTMENT (OUTPATIENT)
Age: 43
End: 2023-04-21

## 2023-04-21 VITALS
DIASTOLIC BLOOD PRESSURE: 73 MMHG | SYSTOLIC BLOOD PRESSURE: 112 MMHG | WEIGHT: 156.53 LBS | TEMPERATURE: 97.2 F | RESPIRATION RATE: 16 BRPM | HEART RATE: 90 BPM | HEIGHT: 65.35 IN | BODY MASS INDEX: 25.77 KG/M2 | OXYGEN SATURATION: 98 %

## 2023-04-21 DIAGNOSIS — Z80.0 FAMILY HISTORY OF MALIGNANT NEOPLASM OF DIGESTIVE ORGANS: ICD-10-CM

## 2023-04-21 PROCEDURE — 99205 OFFICE O/P NEW HI 60 MIN: CPT

## 2023-04-22 PROBLEM — Z80.0 FH: ESOPHAGEAL CANCER: Status: ACTIVE | Noted: 2023-04-22

## 2023-04-22 NOTE — PHYSICAL EXAM
[Fully active, able to carry on all pre-disease performance without restriction] : Status 0 - Fully active, able to carry on all pre-disease performance without restriction [Normal] : affect appropriate [de-identified] : Right breast with healed surgical incision with volume loss; right SLN biopsy site well healed; no palpable findings in left breast

## 2023-04-22 NOTE — HISTORY OF PRESENT ILLNESS
[Disease: _____________________] : Disease: [unfilled] [T: ___] : T[unfilled] [N: ___] : N[unfilled] [M: ___] : M[unfilled] [100: Normal, no complaints, no evidence of disease.] : 100: Normal, no complaints, no evidence of disease. [ECOG Performance Status: 0 - Fully active, able to carry on all pre-disease performance without restriction] : Performance Status: 0 - Fully active, able to carry on all pre-disease performance without restriction [de-identified] : 42 year old premenopausal woman with no personal of family history of breast or ovarian cancer who at the time of screening mammogram and US on 23 was noted to have right breast calcifications BIRADs 0.  Repeat diagnostic mammo/US rated BIRADs 4B for 3 suspicious groupings of calcification in the lower outer right breast. 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 \par \par She was seen by Breast Surgeon - Dr. Sylvia Reyes\par Pre-operative MRI 23 - Biopsy proven malignancy in right breast with no other suspicious enhancement and no evidence of multifocal or multicentric disease; no Lymph node involvement.\par \par Lumpectomy on 23 with pathologic upstaging; invasive ductal carcinoma 3.5mm noted to be 1mm from incked margin; DCIS intermediate grade 1.8cm, LCIS noted at <1mm from inked margin; all margins clear of invasive disease and DCIS. ER 70%, PR70%, Her2 neg (0 by IHC)\par \par After several discussions with plastic surgery, she opted to proceed with SLN Bx on 4/3/23 which unfortunately yielded no lymphatic tissue despite 2 lymph nodes being submitted. \par She is planned for additional axillary dissection on 23 and ultimately Radiation Oncology evaluation as well as part of her Breast Conservation Therapy.\par \par Risk - , 1st full term pregnancy at age 33, no OCPs; no fertility therapy; maternal aunt with history of breast cancer; mother with lung cancer; father esophageal/throat cancer in his 50s. \par  [de-identified] : 3.5mm invasive; DCIS 1.8cm, LCIS [de-identified] : ER+ OH+ Her2 Negative [de-identified] : Last Mammo/sono - 1/2023\par Last BMD - N/A\par GI - last colonoscopy N/A\par GYN - Dr Brandy Marquez \par PCP -  Dr Qi Graham \par

## 2023-04-22 NOTE — ASSESSMENT
[FreeTextEntry1] : 41 yo woman with initial diagnosis of DCIS and LCIS in 1/2023, underwent Lumpectomy with upstaging of disease to invasive ductal carcinoma 3.5mm, ER+MO+Her2 negative. Initially wished to undergo bilateral mastectomy with reconstruction but after many discussions, opted to undergo SLN dissection and plan for radiation as part of breast conservation. On 4/3/23 SLN surgery was done but no lymph nodes were able to be identified in the specimen. \par \par - planned to proceed with additional axillary LN dissection on 4/26/23\par - Oncotype ordered on invasive tumor however given size of invasive disease at 3.5mm, unclear if Oncotype can be run on such a small specimen and no clear role for adjuvant chemotherapy in tumor that is <5mm\par - as long as axillary LN are negative, would suggest to proceed with radiation oncology evaluation which is currently scheduled for 5/1/23 with Dr. Cristina Kuhn\par - upon completion of radiation, would initiate adjuvant endocrine therapy with tamoxifen 20mg daily; discussed with patient data in small study involving use of lower dose Tamoxifen at 5mg (compared to 10mg) with better side effect profile; advised if she is able to tolerate full dose, would advocate for its use given extensive data to support 20 mg dose.\par - risk/benefits/side effects including increased risk of thrombotic events including pulmonary embolism and DVT, increase risk of endometrial cancer and increased risk of cataracts; encouraged routine follow-up with gynecology and ophthalmology\par - alternatively discussed option of ovarian suppression (either surgical with bilateral oophorectomy or biochemical with GnRH agonist such as Lupron) to induce post menopausal state and use of aromatase inhibitors. Advised that routinely use of this therapy is for higher stage and more aggressive disease. Certainly if she transitions to menopause, would change Tamoxifen to Aromatase Inhibitor. Risk/benefits/side effects including but not limited to arthralgia, myalgia and osteoporosis discussed with patient.\par - Patient had the opportunity to have all their questions answered to their satisfaction \par - f/u about 1 month after initiation of endocrine therapy\par

## 2023-04-22 NOTE — CONSULT LETTER
[Dear  ___] : Dear  [unfilled], [Consult Letter:] : I had the pleasure of evaluating your patient, [unfilled]. [Please see my note below.] : Please see my note below. [Consult Closing:] : Thank you very much for allowing me to participate in the care of this patient.  If you have any questions, please do not hesitate to contact me. [Sincerely,] : Sincerely, [FreeTextEntry3] : Gilda Day MD\par \par Division of Hematology/Oncology\par [facility]\par [address]\[place] [address] \par Tel: [phone]\par Fax: [phone]\par Email: [email or web address]  [DrToyin  ___] : Dr. BARTHOLOMEW [DrToyin ___] : Dr. BARTHOLOMEW

## 2023-04-24 ENCOUNTER — NON-APPOINTMENT (OUTPATIENT)
Age: 43
End: 2023-04-24

## 2023-04-25 ENCOUNTER — TRANSCRIPTION ENCOUNTER (OUTPATIENT)
Age: 43
End: 2023-04-25

## 2023-04-26 ENCOUNTER — TRANSCRIPTION ENCOUNTER (OUTPATIENT)
Age: 43
End: 2023-04-26

## 2023-04-26 ENCOUNTER — OUTPATIENT (OUTPATIENT)
Dept: OUTPATIENT SERVICES | Facility: HOSPITAL | Age: 43
LOS: 1 days | Discharge: HOME | End: 2023-04-26
Payer: COMMERCIAL

## 2023-04-26 ENCOUNTER — APPOINTMENT (OUTPATIENT)
Dept: SURGICAL ONCOLOGY | Facility: HOSPITAL | Age: 43
End: 2023-04-26

## 2023-04-26 ENCOUNTER — APPOINTMENT (OUTPATIENT)
Dept: NUCLEAR MEDICINE | Facility: HOSPITAL | Age: 43
End: 2023-04-26

## 2023-04-26 ENCOUNTER — APPOINTMENT (OUTPATIENT)
Dept: SURGICAL ONCOLOGY | Facility: CLINIC | Age: 43
End: 2023-04-26

## 2023-04-26 ENCOUNTER — RESULT REVIEW (OUTPATIENT)
Age: 43
End: 2023-04-26

## 2023-04-26 VITALS
TEMPERATURE: 98 F | OXYGEN SATURATION: 100 % | DIASTOLIC BLOOD PRESSURE: 71 MMHG | HEIGHT: 65 IN | SYSTOLIC BLOOD PRESSURE: 119 MMHG | WEIGHT: 156.09 LBS | RESPIRATION RATE: 16 BRPM | HEART RATE: 87 BPM

## 2023-04-26 VITALS
DIASTOLIC BLOOD PRESSURE: 66 MMHG | SYSTOLIC BLOOD PRESSURE: 114 MMHG | HEART RATE: 96 BPM | RESPIRATION RATE: 16 BRPM | OXYGEN SATURATION: 96 %

## 2023-04-26 DIAGNOSIS — Z98.891 HISTORY OF UTERINE SCAR FROM PREVIOUS SURGERY: Chronic | ICD-10-CM

## 2023-04-26 DIAGNOSIS — Z98.890 OTHER SPECIFIED POSTPROCEDURAL STATES: Chronic | ICD-10-CM

## 2023-04-26 DIAGNOSIS — C50.919 MALIGNANT NEOPLASM OF UNSPECIFIED SITE OF UNSPECIFIED FEMALE BREAST: ICD-10-CM

## 2023-04-26 LAB — HCG UR QL: NEGATIVE — SIGNIFICANT CHANGE UP

## 2023-04-26 PROCEDURE — 88307 TISSUE EXAM BY PATHOLOGIST: CPT | Mod: 26

## 2023-04-26 PROCEDURE — 38525 BIOPSY/REMOVAL LYMPH NODES: CPT | Mod: 79,RT

## 2023-04-26 PROCEDURE — 38900 IO MAP OF SENT LYMPH NODE: CPT | Mod: 79,RT

## 2023-04-26 PROCEDURE — 78195 LYMPH SYSTEM IMAGING: CPT | Mod: 26

## 2023-04-26 PROCEDURE — 88332 PATH CONSLTJ SURG EA ADD BLK: CPT | Mod: 26

## 2023-04-26 PROCEDURE — 88331 PATH CONSLTJ SURG 1 BLK 1SPC: CPT | Mod: 26

## 2023-04-26 DEVICE — CLIP APPLIER COVIDIEN SURGICLIP 11.5" MEDIUM: Type: IMPLANTABLE DEVICE | Status: FUNCTIONAL

## 2023-04-26 DEVICE — CLIP APPLIER COVIDIEN SURGICLIP III 9" SM: Type: IMPLANTABLE DEVICE | Status: FUNCTIONAL

## 2023-04-26 RX ORDER — SODIUM CHLORIDE 9 MG/ML
1000 INJECTION, SOLUTION INTRAVENOUS
Refills: 0 | Status: DISCONTINUED | OUTPATIENT
Start: 2023-04-26 | End: 2023-04-26

## 2023-04-26 RX ORDER — BENZOCAINE AND MENTHOL 5; 1 G/100ML; G/100ML
1 LIQUID ORAL ONCE
Refills: 0 | Status: COMPLETED | OUTPATIENT
Start: 2023-04-26 | End: 2023-04-26

## 2023-04-26 RX ORDER — KETOROLAC TROMETHAMINE 30 MG/ML
30 SYRINGE (ML) INJECTION ONCE
Refills: 0 | Status: DISCONTINUED | OUTPATIENT
Start: 2023-04-26 | End: 2023-04-26

## 2023-04-26 RX ORDER — HYDROMORPHONE HYDROCHLORIDE 2 MG/ML
0.5 INJECTION INTRAMUSCULAR; INTRAVENOUS; SUBCUTANEOUS
Refills: 0 | Status: DISCONTINUED | OUTPATIENT
Start: 2023-04-26 | End: 2023-04-26

## 2023-04-26 RX ORDER — HYDROMORPHONE HYDROCHLORIDE 2 MG/ML
0.25 INJECTION INTRAMUSCULAR; INTRAVENOUS; SUBCUTANEOUS
Refills: 0 | Status: DISCONTINUED | OUTPATIENT
Start: 2023-04-26 | End: 2023-04-26

## 2023-04-26 RX ORDER — ONDANSETRON 8 MG/1
4 TABLET, FILM COATED ORAL ONCE
Refills: 0 | Status: DISCONTINUED | OUTPATIENT
Start: 2023-04-26 | End: 2023-05-10

## 2023-04-26 RX ORDER — OXYCODONE HYDROCHLORIDE 5 MG/1
5 TABLET ORAL ONCE
Refills: 0 | Status: DISCONTINUED | OUTPATIENT
Start: 2023-04-26 | End: 2023-05-10

## 2023-04-26 RX ORDER — SODIUM CHLORIDE 9 MG/ML
1000 INJECTION, SOLUTION INTRAVENOUS
Refills: 0 | Status: DISCONTINUED | OUTPATIENT
Start: 2023-04-26 | End: 2023-05-10

## 2023-04-26 RX ADMIN — BENZOCAINE AND MENTHOL 1 LOZENGE: 5; 1 LIQUID ORAL at 15:54

## 2023-04-26 RX ADMIN — Medication 30 MILLIGRAM(S): at 15:55

## 2023-04-26 NOTE — ASU DISCHARGE PLAN (ADULT/PEDIATRIC) - FOLLOW UP APPOINTMENTS
692 may also call Recovery Room (PACU) 24/7 @ [phone]/[facility]

## 2023-04-26 NOTE — BRIEF OPERATIVE NOTE - OPERATION/FINDINGS
Right Axilla was re-opened using previous incision and explored with Right axillary sentinel lymph node #1 (hot and blue),  identified using gamma probe and blue, [place] Lymph node # 2 and 3 were also removed ( hot and blue

## 2023-04-26 NOTE — ASU DISCHARGE PLAN (ADULT/PEDIATRIC) - NURSING INSTRUCTIONS
no tylenol or acetominophen until [id number] tonight and no Advil/motrin/ibuprofen until after 955pm tonight

## 2023-04-26 NOTE — ASU DISCHARGE PLAN (ADULT/PEDIATRIC) - CARE PROVIDER_API CALL
Reyes, Sylvia A (MD)  Surgery  [address] [place] [address]  Phone: [phone]  Fax: [phone]  Established Patient  Follow Up Time: 2 weeks

## 2023-04-26 NOTE — H&P ADULT - HISTORY OF PRESENT ILLNESS
SCOTTY ALANIZ is a 42 year F with Right breast cancer, dx January 2023 - ( R LOQ DCIS and LCIS, ER/SD positive, s/p Right breast Wire localized x3 partial mastectomy, Tissue transfer on 2/2/7/23 ~pT1a Final path upgraded to Invasive Ductal Carcinoma, 3.5mm, and DCIS. Final margins though negative demonstrate high risk lesions throughout ( LCIS, classic type, ADH). She is now s/p Right SLNBx (0/0) on 4/3/23 with no lymph node tissue present- who presents for repeat Right axillary sentinel lymph node biopsy

## 2023-04-26 NOTE — BRIEF OPERATIVE NOTE - NSICDXBRIEFPROCEDURE_GEN_ALL_CORE_FT
PROCEDURES:  Biopsy or excision, deep lymph node, axillary 26-Apr-2023 15:25:09 Right Axilla Fernanda Luo

## 2023-04-26 NOTE — ASU PATIENT PROFILE, ADULT - FALL HARM RISK - UNIVERSAL INTERVENTIONS
Bed in lowest position, wheels locked, appropriate side rails in place/Call bell, personal items and telephone in reach/Instruct patient to call for assistance before getting out of bed or chair/Non-slip footwear when patient is out of bed/[place] to call system/Physically safe environment - no spills, clutter or unnecessary equipment/Purposeful Proactive Rounding/Room/bathroom lighting operational, light cord in reach

## 2023-04-26 NOTE — H&P ADULT - NSHPPHYSICALEXAM_GEN_ALL_CORE
General: AAOx3, in NAD   Breast: Noted previous Right breast and axillary incisions well healed   Abdomen: soft, NTTP, ND   Lower extremities: BL well perfused

## 2023-04-26 NOTE — H&P ADULT - ASSESSMENT
SCOTTY ALANIZ is a 42 year F with Right breast cancer, dx January 2023 - ( R LOQ DCIS and LCIS, ER/N[place] positive, s/p Right breast Wire localized x3 partial mastectomy, Tissue transfer on 2/2/7/23 ~pT1a Final path upgraded to Invasive Ductal Carcinoma, 3.5mm, and DCIS. Final margins though negative demonstrate high risk lesions throughout ( LCIS, classic type, ADH). She is now s/p Right SLNBx (0/0) on 4/3/23 with no lymph node tissue present- who presents for repeat Right axillary sentinel lymph node biopsy

## 2023-04-26 NOTE — ASU DISCHARGE PLAN (ADULT/PEDIATRIC) - NS MD DC FALL RISK RISK
For information on Fall & Injury Prevention, visit: [email or web address] OR  [email or web address] OR  https://www.cdc.gov/steadi/patient.html

## 2023-05-01 ENCOUNTER — APPOINTMENT (OUTPATIENT)
Dept: RADIATION ONCOLOGY | Facility: CLINIC | Age: 43
End: 2023-05-01
Payer: COMMERCIAL

## 2023-05-01 VITALS
RESPIRATION RATE: 17 BRPM | TEMPERATURE: 97.7 F | OXYGEN SATURATION: 100 % | BODY MASS INDEX: 26.5 KG/M2 | SYSTOLIC BLOOD PRESSURE: 113 MMHG | HEIGHT: 65 IN | WEIGHT: 159.06 LBS | DIASTOLIC BLOOD PRESSURE: 76 MMHG | HEART RATE: 73 BPM

## 2023-05-01 PROCEDURE — 77263 THER RADIOLOGY TX PLNG CPLX: CPT

## 2023-05-01 PROCEDURE — 99204 OFFICE O/P NEW MOD 45 MIN: CPT | Mod: GC,25

## 2023-05-03 ENCOUNTER — TRANSCRIPTION ENCOUNTER (OUTPATIENT)
Age: 43
End: 2023-05-03

## 2023-05-03 LAB — SURGICAL PATHOLOGY STUDY: SIGNIFICANT CHANGE UP

## 2023-05-03 NOTE — PHYSICAL EXAM
[Sclera] : the sclera and conjunctiva were normal [PERRL With Normal Accommodation] : pupils were equal in size, round, reactive to light [] : no respiratory distress [Exaggerated Use Of Accessory Muscles For Inspiration] : no accessory muscle use [Normal] : oriented to person, place and time, the affect was normal, the mood was normal and not anxious [Heart Rate And Rhythm] : heart rate and rhythm were normal [Abdomen Soft] : soft [Nondistended] : nondistended [Axillary Lymph Nodes Enlarged Bilaterally] : axillary [de-identified] : Right lumpectomy and axillary scars are clean and dry, no erythema, mild tenderness [de-identified] : limited ability to abduct/flex right shoulder after recent surgery

## 2023-05-03 NOTE — PHYSICAL EXAM
[Normal Axillary Lymph Nodes] : normal axillary lymph nodes [Normal] : oriented to person, place and time, with appropriate affect [de-identified] : Right breast periareolar incision is well-approximated, no parenchymal defect, no evidence of infection, no erythema, no ecchymosis.   [de-identified] : Normal respiratory effort

## 2023-05-03 NOTE — ASSESSMENT
[FreeTextEntry1] : SCOTTY ALANIZ is a 42 year F with Right breast cancer, dx January 2023 - ( R LOQ DCIS and LCIS, ER/AR positive, s/p Right breast Wire loc partial mastectomy, Tissue transfer on 2/27/23 ~pT1a\par \par Final path confirming R 3.5 mm IDC ER/AR 70% HER2 Negative, DCIS w/ intermediate grade nuclear atypia estimated to measure 1.8 cm and LCIS classic and focal florid type. We discussed that final margins are negative and that high risk lesions were prominent throughout her final margins ( LCIS, classic type, ADH) \par \par - At last visit we discussed that future surveillance will include an annual MRI and annual mammogram, given her high risk status. After review of path, Scotty considered her options of risk reducing mastectomy vs post-lumpectomy treatment with SLNBx.   She met with Dr. Roland and discussed reconstructive options.  Her case was presented at our multi-disciplinary tumor board and agreed with above options.   After careful consideration, she will proceed with post-lumpectomy treatment.   She understands that she will have to return to OR for Right SLNBx.  WE discussed this procedure in detail.  \par \par Plan: Right axillary [place] Lymph node Biopsy\par Tentative date: 4/3/2023 Monday ()\par \par - RTO POV \par \par \par

## 2023-05-03 NOTE — ADDENDUM
[FreeTextEntry1] : SCOTTY ALANIZ is a 42 year F with Right breast cancer, dx January 2023 - ( R LOQ DCIS and LCIS, ER/AK positive, s/p Right breast Wire localized x3 partial mastectomy, Tissue transfer on 2/2/7/23 ~pT1a  Final path upgraded to Invasive Ductal Carcinoma, 3.5mm, and DCIS.  Final margins though negative demonstrate  high risk lesions throughout ( LCIS, classic type, ADH). She is now s/p Right SLNBx (0/0) on 4/3/23 with no lymph node tissue present. \par \par - HR Surveillance: To include annual MRI and annual mammogram due Jan 2024 \par - Will met with medical oncology for adjuvant endocrine therapy, Oncotype Dx to be sent \par - Will met with Dr. Kuhn for initial  radiation oncology evaluation \par - RTO in 2 weeks for televist then will return in 3 months \par \par

## 2023-05-03 NOTE — ASSESSMENT
[FreeTextEntry1] : SCOTTY ALANIZ is a 42 year F w biopsy proven Right Breast cancer (DCIS, LCIS) found on routine imaging s/p Right breast Wire localized partial mastectomy, Tissue transfer on 2/27/23 at [facility]. Final path upgrade to Invasive Ductal Carcinoma, 3.5 mm  ER/FL 70% HER2 Negative, and intermed DCIS, LCIS with Final margins negative.\par R SLNB 4/3/23 (0/0).  \par \par 2/2/2023 INV[facility] Genetic panel: Negative\par 2/27/23  R wide Lumpectomy -->  final path 3.5mm IDC;  preop dx DCIS\par 3/15/23 Met with Dr. Roland for oncoplastic reconstruction options \par 3/20/23  Televisit counseling \par 3/27/23 Case presented at multi-disciplinary breast tumor board to discuss final path and breast conservation vs prophylactic mastectomy.   \par ~ patient opted for post- lumpectomy treatment with SLNBx\par \par She is now s/p R SLNBx on 4/3/2023 at [facility], POD 10 \par She is recovering well after surgery.  \par We discussed that the final path shows benign findings but no lymph nodes.  Path review and re-examination was conducted with similar results.  Oncotype Dx was sent.  \par Will plan for re-attempt for Right sentinel lymph node biopsy with preoperative lymphoscintigraphy and intraoperative frozen path evaluation. \par We discussed the procedure in detail.  \par Will re-present this case at multidisciplinary breast tumor board.

## 2023-05-03 NOTE — HISTORY OF PRESENT ILLNESS
[de-identified] : SCOTTY ALANIZ is a 42 year F w biopsy proven Right Breast cancer (DCIS, LCIS)-->IDC s/p R Lumpectomy with Tissue transfer on 2/2/7/23. Final path upgraded to 3.5mm IDC and DCIS. Final margins negative. \par [facility] Genetic panel Negative. \par \par 3/20/23:  Since her last visit, she met with Dr. Roland to discuss post-mastectomy reconstructive options.   She requests a televisit for further discussion of her surgical options s/p IDC found on lumpectomy with final negative margins. \par

## 2023-05-03 NOTE — LETTER CLOSING
[Consult Closing:] : Thank you for allowing me to participate in the care of this patient.  If you have any questions, please do not hesitate to contact me. [Sincerely yours,] : Sincerely yours, [FreeTextEntry3] : Cristina Kuhn MD\par

## 2023-05-03 NOTE — HISTORY OF PRESENT ILLNESS
[de-identified] : SCOTTY ALANIZ is a 42 year F w biopsy proven Right Breast cancer (DCIS, LCIS) found on routine imaging \par s/p Right wire-loc  partial mastectomy, Tissue transfer on 23. Final path upgrade to 3.5mm IDC, and DCIS, LCIS with Final margins negative. \par \par 2023 [facility] Genetic panel: Negative. \par \par 3/28/23:  Since last visit she has met with plastic surgery, Dr. Roland and discussed post-mastectomy reconstruction options.  She had additional counseling regarding her surgical management of lumpectomy (already completed) and prophylactic mastectomy.   No new breast complaints.  After careful consideration, she is leaning toward breast conservation management and is here to discuss next steps.  \par \par \par Referred by: Breast  \par GYN: Dr Brandy Marquez \par PCP: Dr Qi Graham \par \par PMHx: Denies\par PSHx:  x1 (2018, breech) \par Meds: Multivitamin \par NKDA\par Family Hx: Breast Cancer ( Maternal Great aunt dx early 70s), No ovarian Cancer, Lung Cancer ( Mother dx 73), Lung and Cervical cancer ( maternal Grandmother dx 83), Esophageal and throat cancer ( Father) \par GYN: , menarche age 15, LMP 25. Age at first pregnancy 32.  Yes, 1-3 months.\par Has never been on OCPs \par Bra size: 34C \par

## 2023-05-03 NOTE — VITALS
[Maximal Pain Intensity: 3/10] : 3/10 [Least Pain Intensity: 0/10] : 0/10 [Pain Duration: ___] : Pain duration: [unfilled] [Pain Location: ___] : Pain Location: [unfilled] [NoTreatment Scheduled] : no treatment scheduled [80: Normal activity with effort; some signs or symptoms of disease.] : 80: Normal activity with effort; some signs or symptoms of disease.  [2 - Distress Level] : Distress Level: 2 [Pain Interferes with ADLs] : Pain does not interfere with activities of daily living

## 2023-05-03 NOTE — ASSESSMENT
[FreeTextEntry1] : SCOTTY ALANIZ is a 42 year F with Right breast cancer, dx January 2023 - ( R LOQ DCIS and LCIS, ER/IN positive, s/p Right breast wire-loc partial mastectomy, Tissue transfer on 2/27/23 ~pT1a  Final path upgraded to Invasive Ductal Carcinoma, 3.5mm, and DCIS.   Final margins negative. \par \par \par Final path confirming R 3.5 mm IDC ER/IN 70% HER2 Negative, DCIS w/ intermediate grade nuclear atypia estimated to measure 1.8 cm and LCIS classic and focal florid type. We discussed that final margins are negative and that high risk lesions were prominent throughout her final margins ( LCIS, classic type, ADH) \par \par - We discussed that future surveillance will include an annual MRI and annual mammogram, given her high risk status. After review of path, Scotty considered her options of risk reducing mastectomy vs post-lumpectomy adjuvant treatment and SLNBx.   She met with Dr. Roland and discussed reconstructive options. \par \par We had a lengthy discussion regarding her risk of future breast cancer, her need for axillary evaluation (SLNB), the risks associated with mastectomy and reconstruction, the risks associated with post-lumpectomy radiation, and emotional support regarding her beliefs and fears of either treatment options.  We discussed that at this stage, her cancer has been surgically treated and that a sentinel lymph node biopsy is recommended for final staging.  She is considering a bilateral mastectomy for risk reduction and we discussed the low risk of recurrence 1-2%/year with adjuvant tx after lumpectomy.   She will consider her options carefully and meet with me in 1 week for final surgical plan. \par \par

## 2023-05-03 NOTE — REASON FOR VISIT
[de-identified] : Right Axillary sentinel lymph node biopsy  [de-identified] : 4/3/2023  [de-identified] : 10

## 2023-05-03 NOTE — HISTORY OF PRESENT ILLNESS
[FreeTextEntry1] : Ms. Dickey is a 43 yo woman with initial diagnosis of DCIS and LCIS in 1/2023, underwent Lumpectomy with upstaging of disease to invasive ductal carcinoma 3.5mm, ER+WY+Her2 negative, followed by SLN dissection. She presents for radiotherapy recommendations.\par \par A diagnostic mammogram and breast ultrasound was done on 10/20/20 for left sided breast pain and showed no abnormal findings. She had her first screening mammogram at age 42 on 1/13/23. Calcifications were noted in the lower outer right breast. Breast ultrasound showed no suspicious masses. Right diagnostic mammography on 1/17/23 showed three groupings of similar-appearing calcifications in the lower outer right breast, middle depth.\par \par Stereotactic biopsy of one of the groupings of calcifications in the right breast on 1/19/23 showed ductal carcinoma in situ, with intermediate nuclear grade, cribriform and micropapillary patterns, calcifications and focal necrosis. LCIS, classic type, was also present. IHC showed ER 95% and WY 90%. \par \par Breast MRI on 1/31/23 showed the biopsy clip in the right inferior breast, but there was no associated enhancement. There was no suspicious enhancement elsewhere in the right breast or in the left breast. There was no axillary or internal mammary adenopathy.\par \par Genetic testing with [facility] Breast Cancer STAT Panel and add-on ANDREA and CHEK2 showed no deleterious mutations.\par \par She underwent right breast lumpectomy on 2/27/23 with localization of the three sites of calcifications. Pathology showed invasive well differentiated ductal carcinoma with [place] score 4/9, measuring 3.5 mm. IHC showed ER 70%, WY 70% and HER2 0. Extensive DCIS was present, with micropapillary, solid, and cribriform patters, intermediate nuclear grade, and central necrosis. DCIS measured 1.8 cm and was present in 11 out of 24 blocks. Shaved margins were obtained in six directions. DCIS was present in the inferior shaved margin measuring 2.5 mm and located > 2 mm from the final margin. LCIS, classic type, was present throughout the shaved margins, except for the posterior margin. One lymph node was present in the posterior margin and was benign. \par \par [place] lymph node biopsy on 4/3/23 yielded no lymph nodes but showed benign breast tissue. A second attempt at sentinel lymph node biopsy was made on 4/26/23 and the pathology report is pending. \par \par Today, she feels well with expected mild tenderness over the incision and operative bed. She denies bleeding, drainage, or fevers.

## 2023-05-03 NOTE — RESULTS/DATA
[FreeTextEntry1] : BREAST PATH/RAD REVIEW\par \par 10/20/2020 BL Dx MG/US: BIRADs 1, Heterogeneously dense \par 1/16/2023 BL SC MG/US: BIRADs 0, R slightly LOQ calc's \par 1/17/2023 R Dx MG: BIRADs 4B, R LOQ with 3 groupings of similar appearing calc's -> Rec R Stereo bx of middle grouping and lateral-most grouping, if bx is benign then rec 6 month f/u R Dx MG to confirm stability of other 2 groupings\par \par 1/19/23 R Stereo Bx (R Lower outer breast)\par - DCIS, intermediate nuclear grade, cribriform and micropapillary type with calc's and focal necrosis. (Top hat clip) ER: Positive ( 95%), KS: Positive (90%), Concordant \par - Lobular carcinoma in situ (LCIS), classic type with calcs, Fibrocystic changes with Calc's\par - Note: additional clusters of faint calcifications seen both anterior and posterior to the site of biopsy spanning an entirety of up to 5 cm Anterior to posterior > Rec Breast MRI \par \par 1/31/23 Breast MRI: BIRADs 6, Biopsy proven malignancy in Right breast demonstrates no suspicious enhancement. No evidence for multifocal or multicentric disease. No lymphadenopathy \par \par ~~~~ Images were reviewed with breast radiology. Given the proximity of the 3 areas of calcifications, localization with rosmery will be suboptimal. Will proceed with wire localization bracketing on morning of surgery.\par \par \par ~ 2/27/23 s/p Right breast Wire localized x3 partial mastectomy, Tissue transfer at [facility] ~ pT1a \par Surgical Path: \par Right Breast 6:00 Lumpectomy: Well differentiated IDC ER 70%, KS 70%, HER2 0 Negative, [place] score 4/9, Invasive tumor measures 3.5 mm, DCIS w intermediate grade nuclear atypia and central necrosis, DCIS estimated to measure 1.8 cm, LCIS classic and florid types with central necrosis. Invasive carcinoma 1 mm from inked anterior tissue edge, DCIS and Florid LCIS are present < 1 mm from the inked superior tissue edge. \par R Anterior margin: LCIS, classic type\par R Superior margin: LCIS, classic type, columnar cell changes w/ calcs \par R Lateral margin: ADH, LCIS, classic type \par R Inferior margin: DCIS, measuring 2.5 mm, final margin negative (>2mm), LCIS, classic type\par R Medial margin: ADH, LCIS, classic type, unremarkable skeletal muscle \par R Posterior margin: ADH, Negative LN (0/1), unremarkable skeletal muscle \par

## 2023-05-03 NOTE — HISTORY OF PRESENT ILLNESS
[FreeTextEntry1] : SCOTTY ALANIZ is a 42 year F w biopsy proven Right Breast cancer (DCIS, LCIS) found on routine imaging s/p Right breast Wire localized partial mastectomy, Tissue transfer on 2/27/23 at [facility]. Final path upgrade to Invasive Ductal Carcinoma, 3.5 mm, and DCIS, LCIS with Final margins negative.\par \par 2/2/2023 [facility] Genetic panel: Negative\par 2/27 R Lumpectomy -->  final path 3.5mm IDC;  preop dx DCIS\par 3/15/23 Met with Dr. Roland for oncoplastic reconstruction options \par 3/20/23  Televisit counseling \par 3/27/23 Case presented at multi-disciplinary breast tumor board to discuss final path and breast conservation vs prophylactic mastectomy.   \par ~ patient opted for post- lumpectomy treatment with SLNBx\par \par She is now s/p R SLNBx on 4/3/2023 at [facility], POD 10 \par She is recovering well after surgery.    No complaints.  \par We discussed that the final path shows benign findings but no lymph nodes.  Path review and re-examination was conducted with similar results.  Oncotype Dx was sent.  \par Will plan for re-attempt for Right sentinel lymph node biopsy with preoperative lymphoscintigraphy and intraoperative frozen path evaluation. \par We discussed the procedure in detail.  \par Will re-present this case at multidisciplinary breast tumor board.  \par

## 2023-05-03 NOTE — PHYSICAL EXAM
[Normal Axillary Lymph Nodes] : normal axillary lymph nodes [Normal] : oriented to person, place and time, with appropriate affect [de-identified] : Right breast periareolar incision is well-approximated, no parenchymal defect, no evidence of infection, no erythema, no ecchymosis.    Right axillary incision is c/d/i, no erythema, mild seroma.   [de-identified] : Normal respiratory effort

## 2023-05-04 ENCOUNTER — APPOINTMENT (OUTPATIENT)
Dept: SURGICAL ONCOLOGY | Facility: CLINIC | Age: 43
End: 2023-05-04
Payer: COMMERCIAL

## 2023-05-04 VITALS
BODY MASS INDEX: 26.49 KG/M2 | RESPIRATION RATE: 16 BRPM | HEIGHT: 65 IN | SYSTOLIC BLOOD PRESSURE: 94 MMHG | DIASTOLIC BLOOD PRESSURE: 68 MMHG | OXYGEN SATURATION: 99 % | WEIGHT: 159 LBS

## 2023-05-04 PROBLEM — D05.11 INTRADUCTAL CARCINOMA IN SITU OF RIGHT BREAST: Chronic | Status: ACTIVE | Noted: 2023-02-13

## 2023-05-04 LAB — SURGICAL PATHOLOGY STUDY: SIGNIFICANT CHANGE UP

## 2023-05-04 PROCEDURE — 99024 POSTOP FOLLOW-UP VISIT: CPT

## 2023-05-04 NOTE — HISTORY OF PRESENT ILLNESS
[FreeTextEntry1] : SCOTTY ALANIZ is a 42 year F w biopsy proven Right Breast cancer (DCIS, LCIS) found on routine imaging s/p Right breast Wire localized partial mastectomy, Tissue transfer on 2/27/23 at [facility]. Final path upgrade to Invasive Ductal Carcinoma, 3.5 mm, and DCIS, LCIS with Final margins negative.\par \par 2/2/2023 [facility] Genetic panel: Negative\par 2/27 R Lumpectomy --> final path 3.5 mm IDC; preop dx DCIS\par 3/15/23 Met with Dr. Roland for oncoplastic reconstruction options \par 3/20/23 Televisit counseling \par 3/27/23 Case presented at multi-disciplinary breast tumor board to discuss final path and breast conservation vs prophylactic mastectomy. \par ~ patient opted for post- lumpectomy treatment with SLNBx\par \par 4/3/23 s/p R SLNBx at [facility], POD 10 Final path shows benign findings but no lymph nodes. Path review and re-examination was conducted with similar results. \par 4/24/23 Case re-presented at multidisciplinary breast tumor board with consensus agreeing with plan to re-attempt for Right sentinel lymph node biopsy with preoperative lymphoscintigraphy and intraoperative frozen path evaluation. \par 4/27/23 Oncotype Recurrence score is 5 \par \par She is now s/p Re attempt Right axillary sentinel lymph node biopsy (0/3) with preoperative lymphoscintigraphy and imaging on 4/26/23 at [facility] \par No complaints.  pain is well-controlled.  full ROM.  No fevers or chills.  \par \par

## 2023-05-04 NOTE — REASON FOR VISIT
[de-identified] : Right axillary [place] Lymph node biopsy  [de-identified] : 4/26/2023 [de-identified] : 8

## 2023-05-04 NOTE — PHYSICAL EXAM
[Normal Axillary Lymph Nodes] : normal axillary lymph nodes [Normal] : oriented to person, place and time, with appropriate affect [de-identified] : Right breast periareolar incision is well-approximated, no parenchymal defect, no evidence of infection, no erythema, no ecchymosis.    Right axillary incision is c/d/i, no erythema, no seroma.   [de-identified] : Normal respiratory effort

## 2023-05-04 NOTE — ASSESSMENT
[FreeTextEntry1] : SCOTTY ALANIZ is a 42 year F with Right breast cancer, dx January 2023 - ( R LOQ DCIS and LCIS, ER/OR positive, s/p Right breast Wire localized x3 partial mastectomy, Tissue transfer on 2/2/7/23 ~pT1a Final path upgraded to Invasive Ductal Carcinoma, 3.5mm, and DCIS. Final margins though negative demonstrate high risk lesions throughout ( LCIS, classic type, ADH). s/p Right SLNBx (0/0) on 4/3/23 with no lymph node tissue present. Now s/p R SLNBx (0/3) 4/26/23. \par \par We discussed that her final surgical pathology showed no carcinoma in her sentinel lymph nodes.  \par She is healing well after surgery and has met with med onc and rad onc.  \par - HR Surveillance: To include annual MRI and annual mammogram due Jan 2024.   R Dx MG in 3 months.   \par - Med onc: Met with Dr. Day on 4/21/23. Oncotype Dx RS 5 with plan for adjuvant endocrine therapy with tamoxifen 20 mg QD after completion of radiation \par - Rad Onc: Met with Dr. Kuhn on 5/1/23 for RT with plan for moderately hypofractionated course with a tumor bed boost \par - RTO in 3 months

## 2023-05-04 NOTE — DATA REVIEWED
[FreeTextEntry1] : \par BREAST PATH/RAD REVIEW\par \par 10/20/2020 BL Dx MG/US: BIRADs 1, Heterogeneously dense \par 1/16/2023 BL SC MG/US: BIRADs 0, R slightly LOQ calc's \par 1/17/2023 R Dx MG: BIRADs 4B, R LOQ with 3 groupings of similar appearing calc's -> Rec R Stereo bx of middle grouping and lateral-most grouping, if bx is benign then rec 6 month f/u R Dx MG to confirm stability of other 2 groupings\par \par 1/19/23 R Stereo Bx (R Lower outer breast)\par - DCIS, intermediate nuclear grade, cribriform and micropapillary type with calc's and focal necrosis. (Top hat clip) ER: Positive ( 95%), ID: Positive (90%), Concordant \par - Lobular carcinoma in situ (LCIS), classic type with calcs, Fibrocystic changes with Calc's\par - Note: additional clusters of faint calcifications seen both anterior and posterior to the site of biopsy spanning an entirety of up to 5 cm Anterior to posterior > Rec Breast MRI \par \par 1/31/23 Breast MRI: BIRADs 6, Biopsy proven malignancy in Right breast demonstrates no suspicious enhancement. No evidence for multifocal or multicentric disease. No lymphadenopathy \par \par ~~~~ Images were reviewed with breast radiology. Given the proximity of the 3 areas of calcifications, localization with rosmery will be suboptimal. Will proceed with wire localization bracketing on morning of surgery.\par \par \par ~ 2/27/23 s/p Right breast Wire localized x3 partial mastectomy, Tissue transfer at [facility] ~ pT1a \par Surgical Path: \par Right Breast 6:00 Lumpectomy: Well differentiated IDC ER 70%, ID 70%, HER2 0 Negative, [place] score 4/9, Invasive tumor measures 3.5 mm, DCIS w intermediate grade nuclear atypia and central necrosis, DCIS estimated to measure 1.8 cm, LCIS classic and florid types with central necrosis. Invasive carcinoma 1 mm from inked anterior tissue edge, DCIS and Florid LCIS are present < 1 mm from the inked superior tissue edge. \par R Anterior margin: LCIS, classic type\par R Superior margin: LCIS, classic type, columnar cell changes w/ calcs \par R Lateral margin: ADH, LCIS, classic type \par R Inferior margin: DCIS, measuring 2.5 mm, final margin negative (>2mm), LCIS, classic type\par R Medial margin: ADH, LCIS, classic type, unremarkable skeletal muscle \par R Posterior margin: ADH, Negative LN (0/1), unremarkable skeletal muscle \par \par ~4/3/2023 R SLNBx (0/0) at [facility]. \par ~4/26/2023 R SLNBx (0/3) w/ preoperative lymphoscintigraphy and imaging at [facility] \par

## 2023-05-08 ENCOUNTER — NON-APPOINTMENT (OUTPATIENT)
Age: 43
End: 2023-05-08

## 2023-05-08 PROCEDURE — 77333 RADIATION TREATMENT AID(S): CPT | Mod: 26

## 2023-05-08 PROCEDURE — 77290 THER RAD SIMULAJ FIELD CPLX: CPT | Mod: 26

## 2023-05-16 PROCEDURE — 77300 RADIATION THERAPY DOSE PLAN: CPT | Mod: 26

## 2023-05-16 PROCEDURE — 77295 3-D RADIOTHERAPY PLAN: CPT | Mod: 26

## 2023-05-16 PROCEDURE — 77334 RADIATION TREATMENT AID(S): CPT | Mod: 26

## 2023-05-18 PROCEDURE — 77280 THER RAD SIMULAJ FIELD SMPL: CPT | Mod: 26

## 2023-05-24 ENCOUNTER — NON-APPOINTMENT (OUTPATIENT)
Age: 43
End: 2023-05-24

## 2023-05-24 NOTE — VITALS
[Maximal Pain Intensity: 0/10] : 0/10 [80: Normal activity with effort; some signs or symptoms of disease.] : 80: Normal activity with effort; some signs or symptoms of disease.  [90: Able to carry normal activity; minor signs or symptoms of disease.] : 90: Able to carry normal activity; minor signs or symptoms of disease.

## 2023-05-24 NOTE — REVIEW OF SYSTEMS
[Alopecia: Grade 0] : Alopecia: Grade 0 [Pruritus: Grade 0] : Pruritus: Grade 0 [Skin Atrophy: Grade 0] : Skin Atrophy: Grade 0 [Skin Hyperpigmentation: Grade 0] : Skin Hyperpigmentation: Grade 0 [Skin Induration: Grade 0] : Skin Induration: Grade 0 [Dermatitis Radiation: Grade 0] : Dermatitis Radiation: Grade 0 [Dermatitis Radiation: Grade 1 - Faint erythema or dry desquamation] : Dermatitis Radiation: Grade 1 - Faint erythema or dry desquamation

## 2023-05-25 PROCEDURE — 77427 RADIATION TX MANAGEMENT X5: CPT

## 2023-05-26 NOTE — PHYSICAL EXAM
[Normal] : well developed, well nourished, in no acute distress [de-identified] : Right lumpectomy and axillary scars are clean and dry, trace erythema.

## 2023-05-26 NOTE — HISTORY OF PRESENT ILLNESS
[FreeTextEntry1] : Ms. Dickey is a 41 yo pre-menopausal woman with stage IA, [id number] well differentiated ductal carcinoma of the right breast, ER 70%, IA 70% and HER2 0. She underwent lumpectomy with negative margins and had a negative sentinel node biopsy. She is now receiving radiation therapy.\par \par She is tolerating RT well. She reports mild itching to the nipple region and some pinkness to the skin.

## 2023-05-26 NOTE — DISEASE MANAGEMENT
[Pathological] : TNM Stage: p [IA] : IA [TTNM] : 1a [NTNM] : 0 [MTNM] : 0 [de-identified] : 1060 cGy [de-identified] : 5240 cGy [de-identified] : Right breast

## 2023-05-31 ENCOUNTER — NON-APPOINTMENT (OUTPATIENT)
Age: 43
End: 2023-05-31

## 2023-06-02 PROCEDURE — 77427 RADIATION TX MANAGEMENT X5: CPT

## 2023-06-02 NOTE — HISTORY OF PRESENT ILLNESS
[FreeTextEntry1] : Ms. Dickey is a 41 yo pre-menopausal woman with stage IA, [id number] well differentiated ductal carcinoma of the right breast, ER 70%, VA 70% and HER2 0. She underwent lumpectomy with negative margins and had a negative sentinel node biopsy. She is now receiving radiation therapy.\par \par She is tolerating RT well. She reports mild itching to the nipple region and some pinkness to the skin, breast swelling, using Cetaphil.

## 2023-06-02 NOTE — PHYSICAL EXAM
[Normal] : well developed, well nourished, in no acute distress [de-identified] : Right lumpectomy and axillary scars are clean and dry, mild erythema.

## 2023-06-02 NOTE — DISEASE MANAGEMENT
[Pathological] : TNM Stage: p [IA] : IA [TTNM] : 1a [NTNM] : 0 [MTNM] : 0 [de-identified] : 2120 cGy [de-identified] : 5240 cGy [de-identified] : Right breast

## 2023-06-06 ENCOUNTER — NON-APPOINTMENT (OUTPATIENT)
Age: 43
End: 2023-06-06

## 2023-06-07 ENCOUNTER — NON-APPOINTMENT (OUTPATIENT)
Age: 43
End: 2023-06-07

## 2023-06-07 PROCEDURE — 77280 THER RAD SIMULAJ FIELD SMPL: CPT | Mod: 26

## 2023-06-07 NOTE — PHYSICAL EXAM
[Normal] : well developed, well nourished, in no acute distress [de-identified] : Right lumpectomy and axillary scars are clean and dry, mild erythema and hyperpigmentation, mild rash inframammary

## 2023-06-07 NOTE — HISTORY OF PRESENT ILLNESS
[FreeTextEntry1] : Ms. Dickey is a 43 yo pre-menopausal woman with stage IA, [id number] well differentiated ductal carcinoma of the right breast, ER 70%, MT 70% and HER2 0. She underwent lumpectomy with negative margins and had a negative sentinel node biopsy. She is now receiving radiation therapy.\par \par She is feeling generally well and keeping up with her usual activities. She reports mild itching to the nipple region and some pinkness to the skin, breast swelling, using Cetaphil and Aquaphor, ordered some aloe. She noted a few small red bumps under the breast.

## 2023-06-07 NOTE — DISEASE MANAGEMENT
[Pathological] : TNM Stage: p [IA] : IA [TTNM] : 1a [NTNM] : 0 [MTNM] : 0 [de-identified] : 3445 cGy [de-identified] : 5240 cGy [de-identified] : Right breast

## 2023-06-09 PROCEDURE — 77427 RADIATION TX MANAGEMENT X5: CPT

## 2023-06-14 ENCOUNTER — NON-APPOINTMENT (OUTPATIENT)
Age: 43
End: 2023-06-14

## 2023-06-16 PROCEDURE — 77427 RADIATION TX MANAGEMENT X5: CPT

## 2023-06-19 ENCOUNTER — NON-APPOINTMENT (OUTPATIENT)
Age: 43
End: 2023-06-19

## 2023-06-20 NOTE — PHYSICAL EXAM
[Normal] : well developed, well nourished, in no acute distress [de-identified] : Right lumpectomy and axillary scars are clean and dry, mild to moderate erythema and hyperpigmentation, mild rash inframammary

## 2023-06-20 NOTE — HISTORY OF PRESENT ILLNESS
[FreeTextEntry1] : Ms. Dickey is a 41 yo pre-menopausal woman with stage IA, [id number] well differentiated ductal carcinoma of the right breast, ER 70%, RI 70% and HER2 0. She underwent lumpectomy with negative margins and had a negative sentinel node biopsy. She is now receiving radiation therapy.\par \par She is feeling generally well and keeping up with her usual activities. She reports more redness and itching to the skin. She is using Aquaphor.

## 2023-06-20 NOTE — DISEASE MANAGEMENT
[Pathological] : TNM Stage: p [IA] : IA [TTNM] : 1a [NTNM] : 0 [MTNM] : 0 [de-identified] : 2580 cGy [de-identified] : 5240 cGy [de-identified] : Right breast

## 2023-07-18 ENCOUNTER — OUTPATIENT (OUTPATIENT)
Dept: OUTPATIENT SERVICES | Facility: HOSPITAL | Age: 43
LOS: 1 days | Discharge: HOME | End: 2023-07-18

## 2023-07-18 DIAGNOSIS — Z98.891 HISTORY OF UTERINE SCAR FROM PREVIOUS SURGERY: Chronic | ICD-10-CM

## 2023-07-18 DIAGNOSIS — E34.9 ENDOCRINE DISORDER, UNSPECIFIED: ICD-10-CM

## 2023-07-18 DIAGNOSIS — Z98.890 OTHER SPECIFIED POSTPROCEDURAL STATES: Chronic | ICD-10-CM

## 2023-07-26 ENCOUNTER — APPOINTMENT (OUTPATIENT)
Dept: HEMATOLOGY ONCOLOGY | Facility: CLINIC | Age: 43
End: 2023-07-26
Payer: COMMERCIAL

## 2023-07-26 VITALS
RESPIRATION RATE: 17 BRPM | DIASTOLIC BLOOD PRESSURE: 80 MMHG | SYSTOLIC BLOOD PRESSURE: 120 MMHG | OXYGEN SATURATION: 98 % | HEART RATE: 78 BPM | TEMPERATURE: 96.9 F | HEIGHT: 65 IN

## 2023-07-26 PROCEDURE — 99214 OFFICE O/P EST MOD 30 MIN: CPT

## 2023-07-26 NOTE — ASSESSMENT
[FreeTextEntry1] : 41 yo woman with initial diagnosis of DCIS and LCIS of the right breast in 1/2023, underwent Lumpectomy with upstaging of disease to invasive ductal carcinoma 3.5mm, ER+HI+Her2 negative. Initially wished to undergo bilateral mastectomy with reconstruction but after many discussions, opted to undergo breast conservation with  SLN dissection and adjuvant radiation; additional LN sampling on 4/26/23 revealed 3 LNs which were negative for carcinoma\par \par - completed adjuvant RT with Dr. Cristina Kuhn\par - initiated in 7/2023 on tamoxifen 20 mg daily\par - risk/benefits/side effects including increased risk of thrombotic events including pulmonary embolism and DVT, increase risk of endometrial cancer and increased risk of cataracts; encouraged routine follow-up with gynecology and ophthalmology\par - has had minimal hair thinning but overall tolerating endocrine therapy\par - no current plans for additional pregnancies\par - Patient had the opportunity to have all their questions answered to their satisfaction \par - she will have visit with PMD and have labs drawn in 11/2023\par - f/u in 6 months or as needed\par

## 2023-07-26 NOTE — HISTORY OF PRESENT ILLNESS
[Disease: _____________________] : Disease: [unfilled] [T: ___] : T[unfilled] [N: ___] : N[unfilled] [M: ___] : M[unfilled] [100: Normal, no complaints, no evidence of disease.] : 100: Normal, no complaints, no evidence of disease. [ECOG Performance Status: 0 - Fully active, able to carry on all pre-disease performance without restriction] : Performance Status: 0 - Fully active, able to carry on all pre-disease performance without restriction [de-identified] : 42 year old premenopausal woman with no personal of family history of breast or ovarian cancer who at the time of screening mammogram and US on 23 was noted to have right breast calcifications BIRADs 0.  Repeat diagnostic mammo/US rated BIRADs 4B for 3 suspicious groupings of calcification in the lower outer right breast. 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 \par \par She was seen by Breast Surgeon - Dr. Sylvia Reyes\par Pre-operative MRI 23 - Biopsy proven malignancy in right breast with no other suspicious enhancement and no evidence of multifocal or multicentric disease; no Lymph node involvement.\par \par Lumpectomy on 23 with pathologic upstaging; invasive ductal carcinoma 3.5mm noted to be 1mm from incked margin; DCIS intermediate grade 1.8cm, LCIS noted at <1mm from inked margin; all margins clear of invasive disease and DCIS. ER 70%, PR70%, Her2 neg (0 by IHC)\par \par After several discussions with plastic surgery, she opted to proceed with SLN Bx on 4/3/23 which unfortunately yielded no lymphatic tissue despite 2 lymph nodes being submitted. \par She underwent additional axillary dissection on 23 which revealed 3 LNs with no evidence of malignancy and ultimately Radiation Oncology evaluation as well as part of her Breast Conservation Therapy.\par \par Risk - , 1st full term pregnancy at age 33, no OCPs; no fertility therapy; maternal aunt with history of breast cancer; mother with lung cancer; father esophageal/throat cancer in his 50s. \par  [de-identified] : 3.5mm invasive; DCIS 1.8cm, LCIS [de-identified] : ER+ [place]+ Her2 Negative [de-identified] : 7/26/23\par Patient returns today to rule out progression or recurrence of breast cancer and to assess treatment toxicity. \par Completed adjuvant RT with Dr. Kuhn 4240 cG and 1000cGy boost 5/19/-23 --> 6/16/23.\par Started Tamoxifen 7/1/23 - has minimal hair thinning; overall tolerating well; has some minimal skin burns which are healing post RT\par \par Patient denies any SOB, CP, abdominal pain, bone pain, headache, or unexplained weight loss\par Patient denies any breast masses,  skin changes or nipple discharge.\par Patient denies any hotflashes, arthralgias, vaginal dryness, vaginal bleeding, hair loss, muscle cramps. \par \par Last Mammo/sono - 1/2023\par Last BMD - N/A\par GI - last colonoscopy N/A\par GYN - Dr Brandy Marquez \par PCP -  Dr Qi Graham - has annual physical appointment in 11/2023\par

## 2023-07-26 NOTE — PHYSICAL EXAM
[Fully active, able to carry on all pre-disease performance without restriction] : Status 0 - Fully active, able to carry on all pre-disease performance without restriction [Normal] : affect appropriate [de-identified] : Right breast with healed megan-areolar surgical incision with volume loss; right SLN biopsy site well healed; post RT skin changes in the right breast in inferior portion; Left breast with no palpable masses

## 2023-07-31 ENCOUNTER — NON-APPOINTMENT (OUTPATIENT)
Age: 43
End: 2023-07-31

## 2023-08-03 ENCOUNTER — APPOINTMENT (OUTPATIENT)
Dept: SURGICAL ONCOLOGY | Facility: CLINIC | Age: 43
End: 2023-08-03
Payer: COMMERCIAL

## 2023-08-03 ENCOUNTER — APPOINTMENT (OUTPATIENT)
Dept: RADIATION ONCOLOGY | Facility: CLINIC | Age: 43
End: 2023-08-03
Payer: COMMERCIAL

## 2023-08-03 VITALS
RESPIRATION RATE: 17 BRPM | BODY MASS INDEX: 25.66 KG/M2 | OXYGEN SATURATION: 99 % | WEIGHT: 154 LBS | HEART RATE: 66 BPM | HEIGHT: 65 IN

## 2023-08-03 VITALS
WEIGHT: 154 LBS | OXYGEN SATURATION: 100 % | HEIGHT: 65 IN | SYSTOLIC BLOOD PRESSURE: 113 MMHG | BODY MASS INDEX: 25.66 KG/M2 | HEART RATE: 76 BPM | DIASTOLIC BLOOD PRESSURE: 72 MMHG | TEMPERATURE: 97.34 F

## 2023-08-03 PROCEDURE — 99024 POSTOP FOLLOW-UP VISIT: CPT

## 2023-08-03 PROCEDURE — 99213 OFFICE O/P EST LOW 20 MIN: CPT

## 2023-08-03 RX ORDER — OXYCODONE HYDROCHLORIDE 5 MG/1
5 CAPSULE ORAL EVERY 6 HOURS
Qty: 5 | Refills: 0 | Status: DISCONTINUED | OUTPATIENT
Start: 2023-02-27 | End: 2023-08-03

## 2023-08-03 NOTE — PHYSICAL EXAM
[Normal] : supple, no neck mass and thyroid not enlarged [Normal Neck Lymph Nodes] : normal neck lymph nodes  [Normal Supraclavicular Lymph Nodes] : normal supraclavicular lymph nodes [Normal Axillary Lymph Nodes] : normal axillary lymph nodes [Normal] : oriented to person, place and time, with appropriate affect [de-identified] : Right breast periareolar incision is well-approximated, no parenchymal defect, no evidence of infection, no erythema, no ecchymosis.    Right axillary incision is c/d/i, no erythema, no seroma.   Bilateral breasts with no palpable masses.   No parenchymal defect.  No clinical lymphadenopathy.   [de-identified] : Normal respiratory effort

## 2023-08-03 NOTE — RESULTS/DATA
[FreeTextEntry1] : BREAST PATH/RAD REVIEW  10/20/2020 BL Dx MG/US: BIRADs 1, Heterogeneously dense  1/16/2023 BL SC MG/US: BIRADs 0, R slightly LOQ calc's  1/17/2023 R Dx MG: BIRADs 4B, R LOQ with 3 groupings of similar appearing calc's -> Rec R Stereo bx of middle grouping and lateral-most grouping, if bx is benign then rec 6 month f/u R Dx MG to confirm stability of other 2 groupings  1/19/23 R Stereo Bx (R Lower outer breast) - DCIS, intermediate nuclear grade, cribriform and micropapillary type with calc's and focal necrosis. (Top hat clip) ER: Positive ( 95%), AK: Positive (90%), Concordant  - Lobular carcinoma in situ (LCIS), classic type with calcs, Fibrocystic changes with Calc's - Note: additional clusters of faint calcifications seen both anterior and posterior to the site of biopsy spanning an entirety of up to 5 cm Anterior to posterior > Rec Breast MRI   1/31/23 Breast MRI: BIRADs 6, Biopsy proven malignancy in Right breast demonstrates no suspicious enhancement. No evidence for multifocal or multicentric disease. No lymphadenopathy   ~~~~ Images were reviewed with breast radiology. Given the proximity of the 3 areas of calcifications, localization with rosmery will be suboptimal. Will proceed with wire localization bracketing on morning of surgery.   ~ 2/27/23 s/p Right breast Wire localized x3 partial mastectomy, Tissue transfer at [facility] ~ pT1a  Surgical Path:  Right Breast 6:00 Lumpectomy: Well differentiated IDC ER 70%, AK 70%, HER2 0 Negative, [place] score 4/9, Invasive tumor measures 3.5 mm, DCIS w intermediate grade nuclear atypia and central necrosis, DCIS estimated to measure 1.8 cm, LCIS classic and florid types with central necrosis. Invasive carcinoma 1 mm from inked anterior tissue edge, DCIS and Florid LCIS are present < 1 mm from the inked superior tissue edge.  R Anterior margin: LCIS, classic type R Superior margin: LCIS, classic type, columnar cell changes w/ calcs  R Lateral margin: ADH, LCIS, classic type  R Inferior margin: DCIS, measuring 2.5 mm, final margin negative (>2mm), LCIS, classic type R Medial margin: ADH, LCIS, classic type, unremarkable skeletal muscle  R Posterior margin: ADH, Negative LN (0/1), unremarkable skeletal muscle   ~4/3/2023 R SLNBx (0/0) at [facility].  ~4/26/2023 R SLNBx (0/3) w/ preoperative lymphoscintigraphy and imaging at [facility]

## 2023-08-03 NOTE — HISTORY OF PRESENT ILLNESS
[de-identified] : SCOTTY ALANIZ is a 42 year F with h/o Right breast cancer, dx January 2023 - (R LOQ DCIS and LCIS, ER/[place] (+), s/p Right breast Wire localized x3 partial mastectomy, Tissue transfer on 2/2/7/23 ~pT1a Final path upgraded to Invasive Ductal Carcinoma, 3.5mm, and DCIS. Final margins though negative demonstrate high risk lesions throughout (LCIS, classic type, ADH). s/p Right SLNBx (0/0) on 4/3/23 with no lymph node tissue present->s/p R SLNBx (0/3) 4/26/23. She is s/p RT to Right breast on adjuvant tamoxifen. Here for 3-month follow-up  2/2/2023 INV[facility]AE Genetic panel: Negative 2/27 R Lumpectomy --> final path 3.5 mm IDC; preop dx DCIS 3/15/23 Met with Dr. Roland for oncoplastic reconstruction options  3/27/23 Case presented at multi-disciplinary breast tumor board to discuss final path and breast conservation vs prophylactic mastectomy.  ~ patient opted for post- lumpectomy treatment with SLNBx  4/3/23 s/p R SLNBx at [facility], POD 10 Final path shows benign findings but no lymph nodes. Path review and re-examination was conducted with similar results.  4/24/23 Case re-presented at multidisciplinary breast tumor board with consensus agreeing with plan to re-attempt for Right sentinel lymph node biopsy with preoperative lymphoscintigraphy and intraoperative frozen path evaluation.  4/27/23 Oncotype Recurrence score is 5  4/26/23 s/p Re-attempt Right axillary sentinel lymph node biopsy (0/3) with preoperative lymphoscintigraphy and imaging on at [facility] 6/19/23 completed adjuvant radiation therapy to Right breast  7/1/23 Started adjuvant Tamoxifen 20 mg QD  8/3/23 Doing well.   No breast complaints.  No new symptoms.   Reports some hair loss since starting Tamoxifen.

## 2023-08-03 NOTE — ASSESSMENT
[FreeTextEntry1] : SCOTTY ALANIZ is a 42 year F with Right breast cancer, dx January 2023 - (R LOQ DCIS and LCIS, ER/RI (+), s/p Right breast Wire localized x3 partial mastectomy, Tissue transfer on 2/2/7/23 ~pT1a Final path upgraded to Invasive Ductal Carcinoma, 3.5mm, and DCIS. Final margins though negative demonstrate high risk lesions throughout (LCIS, classic type, ADH). s/p Right SLNBx (0/0) on 4/3/23 with no lymph node tissue present->s/p R SLNBx (0/3) 4/26/23. She is s/p RT to Right breast on adjuvant tamoxifen. Here for 3-month follow-up   She has healed well after surgery and radiation.   Tolerating tamoxifen.    R Dx MG in 3 months (Scheduled for this Friday, she will be called with the results)    - HR Surveillance: To include annual MRI and annual mammogram due Jan 2024.    - Med onc: Following with Dr. Day. Oncotype Dx RS 5. Taking adjuvant tamoxifen 20 mg QD  - Rad Onc: Following with Dr. Kuhn, s/p RT on 6/19/2023  - RTO in 6 months (Jan 2024).

## 2023-08-04 ENCOUNTER — APPOINTMENT (OUTPATIENT)
Dept: MAMMOGRAPHY | Facility: IMAGING CENTER | Age: 43
End: 2023-08-04
Payer: COMMERCIAL

## 2023-08-04 ENCOUNTER — RESULT REVIEW (OUTPATIENT)
Age: 43
End: 2023-08-04

## 2023-08-04 ENCOUNTER — OUTPATIENT (OUTPATIENT)
Dept: OUTPATIENT SERVICES | Facility: HOSPITAL | Age: 43
LOS: 1 days | End: 2023-08-04
Payer: COMMERCIAL

## 2023-08-04 DIAGNOSIS — Z98.890 OTHER SPECIFIED POSTPROCEDURAL STATES: Chronic | ICD-10-CM

## 2023-08-04 DIAGNOSIS — R92.1 MAMMOGRAPHIC CALCIFICATION FOUND ON DIAGNOSTIC IMAGING OF BREAST: ICD-10-CM

## 2023-08-04 DIAGNOSIS — Z98.891 HISTORY OF UTERINE SCAR FROM PREVIOUS SURGERY: Chronic | ICD-10-CM

## 2023-08-04 PROCEDURE — 77065 DX MAMMO INCL CAD UNI: CPT | Mod: 26,RT

## 2023-08-04 PROCEDURE — 77065 DX MAMMO INCL CAD UNI: CPT

## 2023-08-04 PROCEDURE — G0279: CPT | Mod: 26

## 2023-08-04 PROCEDURE — G0279: CPT

## 2023-08-09 NOTE — HISTORY OF PRESENT ILLNESS
[FreeTextEntry1] : Ms. Dickey is a 41 yo pre-menopausal woman with stage IA, [id number] well differentiated ductal carcinoma of the right breast, ER 70%, UT 70% and HER2 0. She underwent lumpectomy with negative margins and had a negative sentinel node biopsy. She completed a course of radiation to the right breast, a dose of 5,240 cGy from 5/19/23 - 6/16/23.  She comes today for a post-treatment evaluation. The patient reports feeling generally well. She is involved in her usual activities. She has a good appetite and denies dysphagia. She denies cough or shortness of breath.  There has been improvement in the skin since completing radiation therapy, with residual hyperpigmentation mainly under the breast. She is taking tamoxifen and noted perhaps more of the hair falls out. She noted a small reddish area on her breast, upper inner, smaller this morning. She follows Dr. Day and Dr. Reyes and has an appointment for mammogram tomorrow.

## 2023-08-09 NOTE — PHYSICAL EXAM
[Sclera] : the sclera and conjunctiva were normal [] : no respiratory distress [Heart Rate And Rhythm] : heart rate and rhythm were normal [Abdomen Soft] : soft [Nondistended] : nondistended [Axillary Lymph Nodes Enlarged Bilaterally] : axillary [Normal] : no joint swelling, no clubbing or cyanosis of the fingernails and muscle strength and tone were normal [de-identified] : Right lumpectomy and axillary scars are well healed, mild residual hyperpigmentation inframammary fold, superficial micropapillary erythematous patch <1cm on upper inner breast skin, blanching

## 2023-08-28 NOTE — RESULTS/DATA
[FreeTextEntry1] : BREAST PATH/RAD REVIEW  10/20/2020 BL Dx MG/US: BIRADs 1, Heterogeneously dense  1/16/2023 BL SC MG/US: BIRADs 0, R slightly LOQ calc's  1/17/2023 R Dx MG: BIRADs 4B, R LOQ with 3 groupings of similar appearing calc's -> Rec R Stereo bx of middle grouping and lateral-most grouping, if bx is benign then rec 6 month f/u R Dx MG to confirm stability of other 2 groupings  1/19/23 R Stereo Bx (R Lower outer breast) - DCIS, intermediate nuclear grade, cribriform and micropapillary type with calc's and focal necrosis. (Top hat clip) ER: Positive ( 95%), [place]: Positive (90%), Concordant  - Lobular carcinoma in situ (LCIS), classic type with calcs, Fibrocystic changes with Calc's - Note: additional clusters of faint calcifications seen both anterior and posterior to the site of biopsy spanning an entirety of up to 5 cm Anterior to posterior > Rec Breast MRI   1/31/23 Breast MRI: BIRADs 6, Biopsy proven malignancy in Right breast demonstrates no suspicious enhancement. No evidence for multifocal or multicentric disease. No lymphadenopathy   ~~~~ Images were reviewed with breast radiology. Given the proximity of the 3 areas of calcifications, localization with rosmery will be suboptimal. Will proceed with wire localization bracketing on morning of surgery.   ~ 2/27/23 s/p Right breast Wire localized x3 partial mastectomy, Tissue transfer at [facility] ~ pT1a  Surgical Path:  Right Breast 6:00 Lumpectomy: Well differentiated IDC ER 70%, IN 70%, HER2 0 Negative, [place] score 4/9, Invasive tumor measures 3.5 mm, DCIS w intermediate grade nuclear atypia and central necrosis, DCIS estimated to measure 1.8 cm, LCIS classic and florid types with central necrosis. Invasive carcinoma 1 mm from inked anterior tissue edge, DCIS and Florid LCIS are present < 1 mm from the inked superior tissue edge.  R Anterior margin: LCIS, classic type R Superior margin: LCIS, classic type, columnar cell changes w/ calcs  R Lateral margin: ADH, LCIS, classic type  R Inferior margin: DCIS, measuring 2.5 mm, final margin negative (>2mm), LCIS, classic type R Medial margin: ADH, LCIS, classic type, unremarkable skeletal muscle  R Posterior margin: ADH, Negative LN (0/1), unremarkable skeletal muscle   ~4/3/2023 R [facility]x (0/0) at [facility].  ~4/26/2023 R SLNBx (0/3) w/ preoperative lymphoscintigraphy and imaging at [facility]   8/4/23 R Dx MG: BIRADs 2. Heterogeneously dense.

## 2023-08-28 NOTE — HISTORY OF PRESENT ILLNESS
[de-identified] : SCOTTY ALANIZ is a 42-year F with h/o Right breast cancer dx 1/2023 (R LOQ DCIS ER/SD (+), LCIS, s/p Right wire loc x3 PM, TT on 2/2/7/23 ~pT1a Final path upgraded to IDC 3.5mm, and DCIS. Final margins though negative demonstrate high risk lesions throughout (LCIS, classic type, ADH). s/p R SLNBx (0/0) on 4/3/23 t->s/p R SLNBx (0/3) re-excision on 4/26/23. Completed XRT, on adjuvant tamoxifen. She presents of evaluation of breast lesion.    2/2/2023 INV[facility] Genetic panel: Negative 2/27 R Lumpectomy --> final path 3.5 mm IDC; preop dx DCIS 3/15/23 Met with Dr. Roland for oncoplastic reconstruction options  3/27/23 Case presented at multi-disciplinary breast tumor board to discuss final path and breast conservation vs prophylactic mastectomy.  ~ patient opted for post- lumpectomy treatment with SLNBx  4/3/23 s/p R SLNBx at [facility], POD 10 Final path shows benign findings but no lymph nodes. Path review and re-examination was conducted with similar results.  4/24/23 Case re-presented at multidisciplinary breast tumor board with consensus agreeing with plan to re-attempt for Right sentinel lymph node biopsy with preoperative lymphoscintigraphy and intraoperative frozen path evaluation.  4/27/23 Oncotype Recurrence score is 5  4/26/23 s/p Re-attempt Right axillary sentinel lymph node biopsy (0/3) with preoperative lymphoscintigraphy and imaging on at [facility] 6/19/23 completed adjuvant radiation therapy to Right breast  7/1/23 Started adjuvant Tamoxifen 20 mg QD  8/3/23 Doing well.   No breast complaints.  No new symptoms.   Reports some hair loss since starting Tamoxifen.   8/28/23

## 2023-08-28 NOTE — ASSESSMENT
[FreeTextEntry1] : SCOTTY ALANIZ is a 42 year F with Right breast cancer, dx January 2023 - (R LOQ DCIS and LCIS, ER/MT (+), s/p Right Wire loc x3 PM TT on 2/2/7/23 ~pT1a Final path upgraded to IDC, 3.5mm, and DCIS. Final margins though negative demonstrate high risk lesions throughout (LCIS, classic type, ADH). s/p Right SLNBx (0/0) on 4/3/23 with no lymph node tissue present->s/p R SLNBx (0/3) 4/26/23. s/p XRT on adjuvant tamoxifen.    - HR Surveillance: To include annual MRI and annual mammogram due Jan 2024.    - Med onc: Following with Dr. Day. Oncotype Dx RS 5. Taking adjuvant tamoxifen 20 mg QD  - Rad Onc: Following with Dr. Kuhn, s/p XRT on 6/19/2023  - RTO in 6 months (Jan 2024).

## 2023-08-29 ENCOUNTER — APPOINTMENT (OUTPATIENT)
Dept: SURGICAL ONCOLOGY | Facility: CLINIC | Age: 43
End: 2023-08-29
Payer: COMMERCIAL

## 2023-08-29 VITALS
HEART RATE: 95 BPM | SYSTOLIC BLOOD PRESSURE: 119 MMHG | WEIGHT: 153 LBS | BODY MASS INDEX: 25.49 KG/M2 | HEIGHT: 65 IN | RESPIRATION RATE: 16 BRPM | DIASTOLIC BLOOD PRESSURE: 65 MMHG | OXYGEN SATURATION: 99 %

## 2023-08-29 PROCEDURE — 99213 OFFICE O/P EST LOW 20 MIN: CPT

## 2023-09-11 ENCOUNTER — APPOINTMENT (OUTPATIENT)
Dept: RADIATION ONCOLOGY | Facility: CLINIC | Age: 43
End: 2023-09-11
Payer: COMMERCIAL

## 2023-09-11 VITALS
BODY MASS INDEX: 25.25 KG/M2 | SYSTOLIC BLOOD PRESSURE: 111 MMHG | HEART RATE: 80 BPM | TEMPERATURE: 97.88 F | WEIGHT: 151.57 LBS | RESPIRATION RATE: 16 BRPM | OXYGEN SATURATION: 100 % | DIASTOLIC BLOOD PRESSURE: 80 MMHG | HEIGHT: 65 IN

## 2023-09-11 PROCEDURE — 99212 OFFICE O/P EST SF 10 MIN: CPT

## 2023-09-18 ENCOUNTER — OUTPATIENT (OUTPATIENT)
Dept: OUTPATIENT SERVICES | Facility: HOSPITAL | Age: 43
LOS: 1 days | End: 2023-09-18
Payer: COMMERCIAL

## 2023-09-18 ENCOUNTER — APPOINTMENT (OUTPATIENT)
Dept: ULTRASOUND IMAGING | Facility: CLINIC | Age: 43
End: 2023-09-18
Payer: COMMERCIAL

## 2023-09-18 ENCOUNTER — NON-APPOINTMENT (OUTPATIENT)
Age: 43
End: 2023-09-18

## 2023-09-18 DIAGNOSIS — R10.2 PELVIC AND PERINEAL PAIN: ICD-10-CM

## 2023-09-18 DIAGNOSIS — Z98.891 HISTORY OF UTERINE SCAR FROM PREVIOUS SURGERY: Chronic | ICD-10-CM

## 2023-09-18 PROCEDURE — 76856 US EXAM PELVIC COMPLETE: CPT | Mod: 26

## 2023-09-18 PROCEDURE — 76830 TRANSVAGINAL US NON-OB: CPT

## 2023-09-18 PROCEDURE — 76856 US EXAM PELVIC COMPLETE: CPT

## 2023-09-18 PROCEDURE — 76830 TRANSVAGINAL US NON-OB: CPT | Mod: 26

## 2023-09-19 LAB — BACTERIA UR CULT: NORMAL

## 2023-09-20 ENCOUNTER — TRANSCRIPTION ENCOUNTER (OUTPATIENT)
Age: 43
End: 2023-09-20

## 2023-09-26 ENCOUNTER — APPOINTMENT (OUTPATIENT)
Dept: UROLOGY | Facility: CLINIC | Age: 43
End: 2023-09-26

## 2023-10-02 ENCOUNTER — APPOINTMENT (OUTPATIENT)
Dept: UROLOGY | Facility: CLINIC | Age: 43
End: 2023-10-02
Payer: COMMERCIAL

## 2023-10-02 PROCEDURE — 99204 OFFICE O/P NEW MOD 45 MIN: CPT

## 2023-10-05 ENCOUNTER — APPOINTMENT (OUTPATIENT)
Dept: ULTRASOUND IMAGING | Facility: CLINIC | Age: 43
End: 2023-10-05

## 2023-10-06 ENCOUNTER — APPOINTMENT (OUTPATIENT)
Dept: HEMATOLOGY ONCOLOGY | Facility: CLINIC | Age: 43
End: 2023-10-06
Payer: COMMERCIAL

## 2023-10-06 VITALS
BODY MASS INDEX: 25.13 KG/M2 | TEMPERATURE: 98.2 F | WEIGHT: 150.99 LBS | RESPIRATION RATE: 16 BRPM | SYSTOLIC BLOOD PRESSURE: 120 MMHG | HEART RATE: 84 BPM | OXYGEN SATURATION: 99 % | DIASTOLIC BLOOD PRESSURE: 81 MMHG

## 2023-10-06 PROCEDURE — 99214 OFFICE O/P EST MOD 30 MIN: CPT

## 2023-10-26 ENCOUNTER — APPOINTMENT (OUTPATIENT)
Dept: OBGYN | Facility: CLINIC | Age: 43
End: 2023-10-26
Payer: COMMERCIAL

## 2023-10-26 VITALS
WEIGHT: 151 LBS | DIASTOLIC BLOOD PRESSURE: 76 MMHG | HEIGHT: 65 IN | SYSTOLIC BLOOD PRESSURE: 115 MMHG | HEART RATE: 77 BPM | BODY MASS INDEX: 25.16 KG/M2

## 2023-10-26 DIAGNOSIS — R10.2 PELVIC AND PERINEAL PAIN: ICD-10-CM

## 2023-10-26 PROCEDURE — 99213 OFFICE O/P EST LOW 20 MIN: CPT

## 2023-11-14 ENCOUNTER — APPOINTMENT (OUTPATIENT)
Dept: ORTHOPEDIC SURGERY | Facility: CLINIC | Age: 43
End: 2023-11-14

## 2023-11-30 ENCOUNTER — APPOINTMENT (OUTPATIENT)
Dept: OTOLARYNGOLOGY | Facility: CLINIC | Age: 43
End: 2023-11-30

## 2023-12-01 ENCOUNTER — APPOINTMENT (OUTPATIENT)
Dept: INTERNAL MEDICINE | Facility: CLINIC | Age: 43
End: 2023-12-01

## 2023-12-12 ENCOUNTER — APPOINTMENT (OUTPATIENT)
Dept: OTOLARYNGOLOGY | Facility: CLINIC | Age: 43
End: 2023-12-12

## 2023-12-13 NOTE — ASU PATIENT PROFILE, ADULT - SPIRITUAL CULTURAL, CURRENT SITUATION, PROFILE
Lipid profile recently done.   Results were  in target for her cardiac risk. Check Lipid in 6-12 months.   Statins use is  feasible and  Has no  myalgia and side effects.   Lifestyle modification advise given .  Spent 10 minutes educating patient about benefits of weight loss and more exercise and referral to cardiac rehab.  Also referral to our dietitian.: We discussed mediterranean  diet , 5 servings of vegetable or mixed with fruits daily, 30 minutes cardiac exercise daily, low salt, low Glycemic index and load diet, limited saturated fat, rich in Extra virgin Oive oil and healthy nuts, efforts for avoiding alcohol and smoking and coke beverages, and more effort for  losing weight to a target BMI of < 27 or 15% of current weight loss target . Patient and family  are aware that being overweight can contribute to atrial fibrillation, sleep apnea, hypertension. diabetics and dyslipidemia. See below link. Choost food with Glycemic index < 59 and load < 15 gm.   [email or web address]       none

## 2023-12-18 ENCOUNTER — OUTPATIENT (OUTPATIENT)
Dept: OUTPATIENT SERVICES | Facility: HOSPITAL | Age: 43
LOS: 1 days | End: 2023-12-18
Payer: COMMERCIAL

## 2023-12-18 ENCOUNTER — APPOINTMENT (OUTPATIENT)
Dept: ULTRASOUND IMAGING | Facility: CLINIC | Age: 43
End: 2023-12-18
Payer: COMMERCIAL

## 2023-12-18 DIAGNOSIS — Z98.890 OTHER SPECIFIED POSTPROCEDURAL STATES: Chronic | ICD-10-CM

## 2023-12-18 DIAGNOSIS — Z00.8 ENCOUNTER FOR OTHER GENERAL EXAMINATION: ICD-10-CM

## 2023-12-18 DIAGNOSIS — Z98.891 HISTORY OF UTERINE SCAR FROM PREVIOUS SURGERY: Chronic | ICD-10-CM

## 2023-12-18 PROCEDURE — 76536 US EXAM OF HEAD AND NECK: CPT | Mod: 26

## 2023-12-18 PROCEDURE — 76536 US EXAM OF HEAD AND NECK: CPT

## 2024-01-08 ENCOUNTER — APPOINTMENT (OUTPATIENT)
Dept: MRI IMAGING | Facility: IMAGING CENTER | Age: 44
End: 2024-01-08
Payer: COMMERCIAL

## 2024-01-08 ENCOUNTER — OUTPATIENT (OUTPATIENT)
Dept: OUTPATIENT SERVICES | Facility: HOSPITAL | Age: 44
LOS: 1 days | End: 2024-01-08
Payer: COMMERCIAL

## 2024-01-08 DIAGNOSIS — Z98.891 HISTORY OF UTERINE SCAR FROM PREVIOUS SURGERY: Chronic | ICD-10-CM

## 2024-01-08 DIAGNOSIS — D05.01 LOBULAR CARCINOMA IN SITU OF RIGHT BREAST: ICD-10-CM

## 2024-01-08 DIAGNOSIS — Z98.890 OTHER SPECIFIED POSTPROCEDURAL STATES: Chronic | ICD-10-CM

## 2024-01-08 PROCEDURE — C8908: CPT

## 2024-01-08 PROCEDURE — 77049 MRI BREAST C-+ W/CAD BI: CPT | Mod: 26

## 2024-01-08 PROCEDURE — C8937: CPT

## 2024-01-08 PROCEDURE — A9585: CPT

## 2024-01-09 ENCOUNTER — NON-APPOINTMENT (OUTPATIENT)
Age: 44
End: 2024-01-09

## 2024-01-09 PROBLEM — R92.8 ABNORMAL FINDING ON BREAST IMAGING: Status: ACTIVE | Noted: 2024-01-09

## 2024-01-12 ENCOUNTER — APPOINTMENT (OUTPATIENT)
Dept: MRI IMAGING | Facility: IMAGING CENTER | Age: 44
End: 2024-01-12
Payer: COMMERCIAL

## 2024-01-12 ENCOUNTER — OUTPATIENT (OUTPATIENT)
Dept: OUTPATIENT SERVICES | Facility: HOSPITAL | Age: 44
LOS: 1 days | End: 2024-01-12
Payer: COMMERCIAL

## 2024-01-12 ENCOUNTER — RESULT REVIEW (OUTPATIENT)
Age: 44
End: 2024-01-12

## 2024-01-12 DIAGNOSIS — Z98.890 OTHER SPECIFIED POSTPROCEDURAL STATES: Chronic | ICD-10-CM

## 2024-01-12 DIAGNOSIS — Z98.891 HISTORY OF UTERINE SCAR FROM PREVIOUS SURGERY: Chronic | ICD-10-CM

## 2024-01-12 DIAGNOSIS — R92.8 OTHER ABNORMAL AND INCONCLUSIVE FINDINGS ON DIAGNOSTIC IMAGING OF BREAST: ICD-10-CM

## 2024-01-12 PROCEDURE — 19085 BX BREAST 1ST LESION MR IMAG: CPT

## 2024-01-12 PROCEDURE — 88305 TISSUE EXAM BY PATHOLOGIST: CPT

## 2024-01-12 PROCEDURE — 77065 DX MAMMO INCL CAD UNI: CPT

## 2024-01-12 PROCEDURE — 19085 BX BREAST 1ST LESION MR IMAG: CPT | Mod: LT

## 2024-01-12 PROCEDURE — A4648: CPT

## 2024-01-12 PROCEDURE — 77065 DX MAMMO INCL CAD UNI: CPT | Mod: 26,LT

## 2024-01-12 PROCEDURE — A9585: CPT

## 2024-01-12 PROCEDURE — 88305 TISSUE EXAM BY PATHOLOGIST: CPT | Mod: 26

## 2024-01-17 LAB — SURGICAL PATHOLOGY STUDY: SIGNIFICANT CHANGE UP

## 2024-01-18 ENCOUNTER — NON-APPOINTMENT (OUTPATIENT)
Age: 44
End: 2024-01-18

## 2024-01-19 ENCOUNTER — OUTPATIENT (OUTPATIENT)
Dept: OUTPATIENT SERVICES | Facility: HOSPITAL | Age: 44
LOS: 1 days | Discharge: HOME | End: 2024-01-19

## 2024-01-19 ENCOUNTER — RESULT REVIEW (OUTPATIENT)
Age: 44
End: 2024-01-19

## 2024-01-19 ENCOUNTER — APPOINTMENT (OUTPATIENT)
Dept: MAMMOGRAPHY | Facility: IMAGING CENTER | Age: 44
End: 2024-01-19
Payer: COMMERCIAL

## 2024-01-19 ENCOUNTER — OUTPATIENT (OUTPATIENT)
Dept: OUTPATIENT SERVICES | Facility: HOSPITAL | Age: 44
LOS: 1 days | End: 2024-01-19
Payer: COMMERCIAL

## 2024-01-19 ENCOUNTER — APPOINTMENT (OUTPATIENT)
Dept: ULTRASOUND IMAGING | Facility: IMAGING CENTER | Age: 44
End: 2024-01-19
Payer: COMMERCIAL

## 2024-01-19 DIAGNOSIS — D05.01 LOBULAR CARCINOMA IN SITU OF RIGHT BREAST: ICD-10-CM

## 2024-01-19 DIAGNOSIS — Z98.890 OTHER SPECIFIED POSTPROCEDURAL STATES: Chronic | ICD-10-CM

## 2024-01-19 DIAGNOSIS — Z00.8 ENCOUNTER FOR OTHER GENERAL EXAMINATION: ICD-10-CM

## 2024-01-19 DIAGNOSIS — Z98.891 HISTORY OF UTERINE SCAR FROM PREVIOUS SURGERY: Chronic | ICD-10-CM

## 2024-01-19 DIAGNOSIS — Z85.3 PERSONAL HISTORY OF MALIGNANT NEOPLASM OF BREAST: ICD-10-CM

## 2024-01-19 DIAGNOSIS — E34.9 ENDOCRINE DISORDER, UNSPECIFIED: ICD-10-CM

## 2024-01-19 PROCEDURE — 76641 ULTRASOUND BREAST COMPLETE: CPT | Mod: 26,50

## 2024-01-19 PROCEDURE — 76641 ULTRASOUND BREAST COMPLETE: CPT

## 2024-01-19 PROCEDURE — 77066 DX MAMMO INCL CAD BI: CPT | Mod: 26

## 2024-01-19 PROCEDURE — 77066 DX MAMMO INCL CAD BI: CPT

## 2024-01-19 PROCEDURE — G0279: CPT | Mod: 26

## 2024-01-19 PROCEDURE — G0279: CPT

## 2024-01-26 ENCOUNTER — APPOINTMENT (OUTPATIENT)
Dept: HEMATOLOGY ONCOLOGY | Facility: CLINIC | Age: 44
End: 2024-01-26
Payer: COMMERCIAL

## 2024-01-26 VITALS
RESPIRATION RATE: 16 BRPM | TEMPERATURE: 97.8 F | BODY MASS INDEX: 25.13 KG/M2 | WEIGHT: 151.02 LBS | HEART RATE: 80 BPM | DIASTOLIC BLOOD PRESSURE: 75 MMHG | SYSTOLIC BLOOD PRESSURE: 117 MMHG | OXYGEN SATURATION: 99 %

## 2024-01-26 DIAGNOSIS — D05.11 INTRADUCTAL CARCINOMA IN SITU OF RIGHT BREAST: ICD-10-CM

## 2024-01-26 PROCEDURE — G2211 COMPLEX E/M VISIT ADD ON: CPT

## 2024-01-26 PROCEDURE — 99214 OFFICE O/P EST MOD 30 MIN: CPT

## 2024-01-26 NOTE — HISTORY OF PRESENT ILLNESS
[Disease: _____________________] : Disease: [unfilled] [T: ___] : T[unfilled] [N: ___] : N[unfilled] [M: ___] : M[unfilled] [Date: ____________] : Patient's last distress assessment performed on [unfilled]. [4 - Distress Level] : Distress Level: 4 [100: Normal, no complaints, no evidence of disease.] : 100: Normal, no complaints, no evidence of disease. [ECOG Performance Status: 0 - Fully active, able to carry on all pre-disease performance without restriction] : Performance Status: 0 - Fully active, able to carry on all pre-disease performance without restriction [de-identified] : 43 year old premenopausal woman with no personal of family history of breast or ovarian cancer who at the time of screening mammogram and US on 23 was noted to have right breast calcifications BIRADs 0.  Repeat diagnostic mammo/US rated BIRADs 4B for 3 suspicious groupings of calcification in the lower outer right breast.   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   She was seen by Breast Surgeon - Dr. Sylvia Reyes Pre-operative MRI 23 - Biopsy proven malignancy in right breast with no other suspicious enhancement and no evidence of multifocal or multicentric disease; no Lymph node involvement.  Lumpectomy on 23 with pathologic upstaging; invasive ductal carcinoma 3.5mm noted to be 1mm from incked margin; DCIS intermediate grade 1.8cm, LCIS noted at <1mm from inked margin; all margins clear of invasive disease and DCIS. ER 70%, PR70%, Her2 neg (0 by IHC)  After several discussions with plastic surgery, she opted to proceed with SLN Bx on 4/3/23 which unfortunately yielded no lymphatic tissue despite 2 lymph nodes being submitted.  She underwent additional axillary dissection on 23 which revealed 3 LNs with no evidence of malignancy and ultimately Radiation Oncology evaluation as well as part of her Breast Conservation Therapy.  Risk - , 1st full term pregnancy at age 33, no OCPs; no fertility therapy; maternal aunt with history of breast cancer; mother with lung cancer; father esophageal/throat cancer in his 50s.   [de-identified] : 3.5mm invasive; DCIS 1.8cm, LCIS [de-identified] : ER+ [place]+ Her2 Negative [de-identified] : 1/26/24 Patient returns today to rule out progression of breast cancer and to assess treatment toxicity. Completed adjuvant RT with Dr. Kuhn 4240 cG and 1000cGy boost 5/19/-23 --> 6/16/23. In late August 2023  she developed right chest wall rash in the radiation field concerning for tamoxifen induced radiation recall; had biopsy done c/w spongiotic dermatitis; self limited and has completely resolved Started Tamoxifen 7/1/23 -  tolerating well  Since last visit, she underwent breast MRI 1/8/2024- which showed 1.6cm indeterminant focal nonmass enhancement upper central left breast. Multiple foci in a clustered distribution seen extending posteriorly and inferiorly from the aforementioned span finding in the left breast spanning a distance of 2 cm.  New postlumpectomy and posttreatment changes right breast, with mild diffuse vague asymmetric enhancement of the right breast likely secondary to postsurgical and/post treatment change. MRI guided biopsy on 1/12/24 - ADH and ALH   Patient denies any SOB, CP, abdominal pain, bone pain, headache, or unexplained weight loss Patient denies any breast masses,  skin changes or nipple discharge. Patient denies any hotflashes, arthralgias, vaginal dryness, vaginal bleeding, hair loss, muscle cramps.   Last Mammo/sono - 1/2024 JENNIFER MRI - 1/2024 Last BMD - N/A GI - last colonoscopy N/A GYN - Dr Brandy Marquez  - 10/2023 PCP -  Dr Qi Graham - has annual physical appointment in 11/2023  [FreeTextEntry7] : following with therapist

## 2024-01-26 NOTE — PHYSICAL EXAM
[Fully active, able to carry on all pre-disease performance without restriction] : Status 0 - Fully active, able to carry on all pre-disease performance without restriction [Normal] : grossly intact [de-identified] : Right breast with healed megan-areolar surgical incision with volume loss; right SLN biopsy site well healed; erythema on right chest wall has resolved but noted to have some minimal radiation changes in the lower portion of the breast; Left brest fibroglandular tissue with palpable hematoma at biopsy site [de-identified] : anxious but self aware

## 2024-01-26 NOTE — ASSESSMENT
[FreeTextEntry1] : 42 yo woman with initial diagnosis of DCIS and LCIS of the right breast in 1/2023, underwent Lumpectomy with upstaging of disease to invasive ductal carcinoma 3.5mm, ER+[place]+Her2 negative. Initially wished to undergo bilateral mastectomy with reconstruction but after many discussions, opted to undergo breast conservation with SLN dissection and adjuvant radiation; additional LN sampling on 4/26/23 revealed 3 LNs which were negative for carcinoma  - completed adjuvant RT with Dr. Cristina Kuhn with course complicated by possible tamoxifen induced Radiation Recall; has resolved - initiated in 7/2023 on tamoxifen 20 mg daily - risk/benefits/side effects including increased risk of thrombotic events including pulmonary embolism and DVT, increase risk of endometrial cancer and increased risk of cataracts; encouraged routine follow-up with gynecology and ophthalmology - for LEFT breast MRI abnormalities seen on 1/8/24 underwent biopsy which showed ALH and ADH; planned for additional MRI guided biopsy and ultimately surgical excision by Dr. Reyes - Patient had the opportunity to have all their questions answered to their satisfaction - seen by Dr. Graham [id number] and had labs drawn in 11/2023; will request - f/u in 3 months

## 2024-02-05 ENCOUNTER — RESULT REVIEW (OUTPATIENT)
Age: 44
End: 2024-02-05

## 2024-02-05 ENCOUNTER — APPOINTMENT (OUTPATIENT)
Dept: MRI IMAGING | Facility: IMAGING CENTER | Age: 44
End: 2024-02-05
Payer: COMMERCIAL

## 2024-02-05 ENCOUNTER — OUTPATIENT (OUTPATIENT)
Dept: OUTPATIENT SERVICES | Facility: HOSPITAL | Age: 44
LOS: 1 days | End: 2024-02-05
Payer: COMMERCIAL

## 2024-02-05 DIAGNOSIS — Z98.891 HISTORY OF UTERINE SCAR FROM PREVIOUS SURGERY: Chronic | ICD-10-CM

## 2024-02-05 DIAGNOSIS — R92.8 OTHER ABNORMAL AND INCONCLUSIVE FINDINGS ON DIAGNOSTIC IMAGING OF BREAST: ICD-10-CM

## 2024-02-05 DIAGNOSIS — Z98.890 OTHER SPECIFIED POSTPROCEDURAL STATES: Chronic | ICD-10-CM

## 2024-02-05 PROCEDURE — A4648: CPT

## 2024-02-05 PROCEDURE — 88305 TISSUE EXAM BY PATHOLOGIST: CPT | Mod: 26

## 2024-02-05 PROCEDURE — 88305 TISSUE EXAM BY PATHOLOGIST: CPT

## 2024-02-05 PROCEDURE — 19086 BX BREAST ADD LESION MR IMAG: CPT

## 2024-02-05 PROCEDURE — 19085 BX BREAST 1ST LESION MR IMAG: CPT

## 2024-02-05 PROCEDURE — 77065 DX MAMMO INCL CAD UNI: CPT

## 2024-02-05 PROCEDURE — 77065 DX MAMMO INCL CAD UNI: CPT | Mod: 26,LT

## 2024-02-05 PROCEDURE — 19086 BX BREAST ADD LESION MR IMAG: CPT | Mod: LT

## 2024-02-05 PROCEDURE — 19085 BX BREAST 1ST LESION MR IMAG: CPT | Mod: LT

## 2024-02-05 PROCEDURE — A9585: CPT

## 2024-02-09 ENCOUNTER — APPOINTMENT (OUTPATIENT)
Dept: OBGYN | Facility: CLINIC | Age: 44
End: 2024-02-09
Payer: COMMERCIAL

## 2024-02-09 VITALS
BODY MASS INDEX: 25.16 KG/M2 | SYSTOLIC BLOOD PRESSURE: 116 MMHG | WEIGHT: 151 LBS | HEIGHT: 65 IN | DIASTOLIC BLOOD PRESSURE: 80 MMHG

## 2024-02-09 DIAGNOSIS — Z01.419 ENCOUNTER FOR GYNECOLOGICAL EXAMINATION (GENERAL) (ROUTINE) W/OUT ABNORMAL FINDINGS: ICD-10-CM

## 2024-02-09 PROCEDURE — 99396 PREV VISIT EST AGE 40-64: CPT

## 2024-02-09 NOTE — END OF VISIT
[FreeTextEntry3] : I, Gaby Acosta, acted solely as a scribe for Dr. Brandy Marquez, on 02/09/2024.   All medical record entries made by the scribe were at my, Dr. Brandy Marquez., direction and personally dictated by me on 02/09/2024. I have personally reviewed the chart and agree that the record accurately reflects my personal performance of the history, physical exam, assessment and plan.

## 2024-02-09 NOTE — HISTORY OF PRESENT ILLNESS
[FreeTextEntry1] : SCOTTY ALANIZ is a 43 year old  presenting for annual GYN exam. Pt has h/o of DCIS and LCIS, s/p lumpectomy and radiation, currently takes tamoxifen. Follows with breast surgeon. Pt recently had MRI biopsy of left breast.  Waiting for pathology, considering mastectomy if needed another surgery

## 2024-02-09 NOTE — PLAN
[FreeTextEntry1] : SCOTTY ALANIZ is a 43 year old presenting for annual GYN exam.  -Pap/HPV done today  -continue care with breast specialist   RTO 1 yr for annual

## 2024-02-12 LAB — SURGICAL PATHOLOGY STUDY: SIGNIFICANT CHANGE UP

## 2024-02-13 LAB — HPV HIGH+LOW RISK DNA PNL CVX: NOT DETECTED

## 2024-02-15 ENCOUNTER — APPOINTMENT (OUTPATIENT)
Dept: SURGICAL ONCOLOGY | Facility: CLINIC | Age: 44
End: 2024-02-15
Payer: COMMERCIAL

## 2024-02-15 ENCOUNTER — TRANSCRIPTION ENCOUNTER (OUTPATIENT)
Age: 44
End: 2024-02-15

## 2024-02-15 VITALS
BODY MASS INDEX: 25.16 KG/M2 | HEART RATE: 81 BPM | OXYGEN SATURATION: 99 % | WEIGHT: 151 LBS | SYSTOLIC BLOOD PRESSURE: 110 MMHG | DIASTOLIC BLOOD PRESSURE: 79 MMHG | HEIGHT: 65 IN

## 2024-02-15 DIAGNOSIS — N60.99 UNSPECIFIED BENIGN MAMMARY DYSPLASIA OF UNSPECIFIED BREAST: ICD-10-CM

## 2024-02-15 LAB — CYTOLOGY CVX/VAG DOC THIN PREP: NORMAL

## 2024-02-15 PROCEDURE — 99215 OFFICE O/P EST HI 40 MIN: CPT

## 2024-02-15 NOTE — ASSESSMENT
[FreeTextEntry1] : SCOTTY ALANIZ is a 43-year F with R LOQ DCIS +/+, dx'ed 1/2023, s/p R wire loc x3 PM, TT on 2/2/7/23. pT1a. Final path upgraded to Invasive Ductal Carcinoma, 3.5mm, and DCIS. Final margins though negative demonstrate high risk lesions throughout (LCIS, classic type, ADH). s/p Right SLNBx (0/0) on 4/3/23->s/p R SLNBx (0/3) 4/26/23. She is s/p R XRT, on tamoxifen.   1/8/24 Breast MRI rated BR4 for indeterminate L upper central 1.6 cm focal NME, s/p L upper central MR core bx revealing ADH, ALH [Hourglass].  s/p 2 additional MR guided biopsies to determine extent of disease. Revealing L upper central anterior, benign findings [Stoplight] and upper central posterior w/ focal ALH w/ calcs [Cork]   Surgical plan:  Left breast rosmery bracketed partial mastectomy, possible tissue transfer.  Surgical date: 3/4/2024 [facility].   - Plan to hold anastrozole 2 weeks prior to surgery.  - HR Surveillance: 6-month f/u MRI due 7/2024.  - Med onc: Following with Dr. Day. Oncotype Dx RS 5. on adjuvant tamoxifen 20 mg QD  - Rad Onc: Following with Dr. Kuhn, s/p RT on 6/19/2023  - RTO for POV.

## 2024-02-15 NOTE — RESULTS/DATA
[FreeTextEntry1] : BREAST PATH/RAD REVIEW [facility]:  10/20/2020 BL Dx MG/US: BIRADs 1, Heterogeneously dense  1/16/2023 BL SC MG/US: BIRADs 0, R slightly LOQ calc's  1/17/2023 R Dx MG: BIRADs 4B, R LOQ with 3 groupings of similar appearing calc's -> Rec R Stereo bx of middle grouping and lateral-most grouping, if bx is benign then rec 6 month f/u R Dx MG to confirm stability of other 2 groupings  1/19/23 R Stereo Bx (R Lower outer breast) - DCIS, intermediate nuclear grade, cribriform and micropapillary type with calc's and focal necrosis. (Top hat clip) ER: Positive ( 95%), CO: Positive (90%), Concordant  - Lobular carcinoma in situ (LCIS), classic type with calcs, Fibrocystic changes with Calc's - Note: additional clusters of faint calcifications seen both anterior and posterior to the site of biopsy spanning an entirety of up to 5 cm Anterior to posterior > Rec Breast MRI   1/31/23 Breast MRI: BIRADs 6, Biopsy proven malignancy in Right breast demonstrates no suspicious enhancement. No evidence for multifocal or multicentric disease. No lymphadenopathy   ~~~~ Images were reviewed with breast radiology. Given the proximity of the 3 areas of calcifications, localization with rosmery will be suboptimal. Will proceed with wire localization bracketing on morning of surgery.   2/27/23 s/p Right breast Wire localized x3 partial mastectomy, Tissue transfer at [facility] . pT1a  Surgical Path:  Right Breast 6:00 Lumpectomy: Well differentiated IDC ER 70%, CO 70%, HER2 0 Negative, Cristina score 4/9, Invasive tumor measures 3.5 mm, DCIS w intermediate grade nuclear atypia and central necrosis, DCIS estimated to measure 1.8 cm, LCIS classic and florid types with central necrosis. Invasive carcinoma 1 mm from inked anterior tissue edge, DCIS and Florid LCIS are present < 1 mm from the inked superior tissue edge.  Anterior margin: LCIS, classic type Superior margin: LCIS, classic type, columnar cell changes w/ calcs  Lateral margin: ADH, LCIS, classic type  Inferior margin: DCIS, measuring 2.5 mm, final margin negative (>2mm), LCIS, classic type Medial margin: ADH, LCIS, classic type, unremarkable skeletal muscle  Posterior margin: ADH, Negative LN (0/1), unremarkable skeletal muscle   4/3/2023 s/p R SLNBx (0/0) at [facility].  4/26/2023 s/p R SLNBx (0/3) w/ preoperative lymphoscintigraphy and imaging at [facility]. pN0 8/4/23 R Dx MG: BIRADs 2. HD. R central and axillary post-surgical changes seen w/ surgical clips. Few benign scattered calcs  1/8/24 Breast MRI: BIRADS 4A.  - Indeterminate L upper central 1.6 cm focal NME (Rec MR guided bx) - Multiple L foci in a clustered distribution extending posteriorly and inferiorly spanning 2.0 cm in (If above bx is benign then Rec a 6-month f/u MRI). - R post lumpectomy/post-treatment changes seen w/ mild diffuse vague asymmetric enhancement. (Rec 6-month f/u MRI & correlation w/ final surgical margins)  - Stable R posterior lower outer IM LN.   1/12/24 Left [upper central] MR core bx of NME: ADH, arising in a background of columnar cell changes w/ calcs. ALH. Hourglass Clip. Concordant. (Additional L NME is indeterminate, rec bx)  1/19/24 BL Dx MG/US: BIRADs 2. HD. Stable R mild skin thickening likely 2/2 post-radiation and/or postsurgical change. L upper slightly outer bx marker.   2/5/24 Left 2-site MRI guided bx: 1. Left anterior core bx: Benign breast tissue w/ columnar cell hyperplasia and calcs. Stoplight Clip.  2. Left posterior core bx: Focal ALH, columnar cell change w/ calcs. Cork Clip.  Both concordant (Rec Surg or oncologic management)

## 2024-02-15 NOTE — HISTORY OF PRESENT ILLNESS
[de-identified] : SCOTTY ALANIZ is a 43-year F with R LOQ DCIS +/+, dx'ed 1/2023, s/p R wire loc x3 PM, TT on 2/2/7/23. pT1a. Final path upgraded to Invasive Ductal Carcinoma, 3.5mm, and DCIS. Final margins though negative demonstrate high risk lesions throughout (LCIS, classic type, ADH). s/p Right SLNBx (0/0) on 4/3/23->s/p R SLNBx (0/3) 4/26/23. She is s/p R XRT, on tamoxifen. Here for follow-up after imaging.    2/2/2023 [facility] Genetic panel: Negative 2/27 R Lumpectomy --> final path 3.5 mm IDC; preop dx DCIS 3/15/23 Met with Dr. Roland for oncoplastic reconstruction options  3/27/23 Case presented at multi-disciplinary breast tumor board to discuss final path and breast conservation vs prophylactic mastectomy. Patient opted for post- lumpectomy treatment with SLNBx 4/3/23 s/p R SLNBx at [facility], POD 10 Final path shows benign findings but no lymph nodes. Path review and re-examination was conducted with similar results.  4/24/23 Case re-presented at multidisciplinary breast tumor board with consensus agreeing with plan to re-attempt for Right sentinel lymph node biopsy with preoperative lymphoscintigraphy and intraoperative frozen path evaluation.  4/27/23 Oncotype Recurrence score is 5  4/26/23 s/p Re-attempt Right axillary sentinel lymph node biopsy (0/3) with preoperative lymphoscintigraphy and imaging on at [facility] 6/19/23 completed adjuvant radiation therapy to Right breast  7/1/23 Started adjuvant Tamoxifen 20 mg QD  8/3/23 Doing well.   No breast complaints.  No new symptoms.   Reports some hair loss since starting Tamoxifen.   2/15/24. Reports some ecchymosis after biopsy, otherwise no new breast symptoms or complaints. Tolerating tamoxifen well. Nervous about today's discussion.

## 2024-02-19 ENCOUNTER — RESULT REVIEW (OUTPATIENT)
Age: 44
End: 2024-02-19

## 2024-02-20 ENCOUNTER — TRANSCRIPTION ENCOUNTER (OUTPATIENT)
Age: 44
End: 2024-02-20

## 2024-02-23 ENCOUNTER — NON-APPOINTMENT (OUTPATIENT)
Age: 44
End: 2024-02-23

## 2024-02-29 ENCOUNTER — OUTPATIENT (OUTPATIENT)
Dept: OUTPATIENT SERVICES | Facility: HOSPITAL | Age: 44
LOS: 1 days | End: 2024-02-29
Payer: COMMERCIAL

## 2024-02-29 ENCOUNTER — APPOINTMENT (OUTPATIENT)
Dept: MAMMOGRAPHY | Facility: IMAGING CENTER | Age: 44
End: 2024-02-29

## 2024-02-29 ENCOUNTER — APPOINTMENT (OUTPATIENT)
Dept: MAMMOGRAPHY | Facility: IMAGING CENTER | Age: 44
End: 2024-02-29
Payer: COMMERCIAL

## 2024-02-29 ENCOUNTER — RESULT REVIEW (OUTPATIENT)
Age: 44
End: 2024-02-29

## 2024-02-29 VITALS
TEMPERATURE: 98 F | WEIGHT: 156.97 LBS | HEIGHT: 64 IN | RESPIRATION RATE: 18 BRPM | SYSTOLIC BLOOD PRESSURE: 128 MMHG | DIASTOLIC BLOOD PRESSURE: 79 MMHG | HEART RATE: 82 BPM | OXYGEN SATURATION: 100 %

## 2024-02-29 DIAGNOSIS — Z98.890 OTHER SPECIFIED POSTPROCEDURAL STATES: Chronic | ICD-10-CM

## 2024-02-29 DIAGNOSIS — N60.99 UNSPECIFIED BENIGN MAMMARY DYSPLASIA OF UNSPECIFIED BREAST: ICD-10-CM

## 2024-02-29 DIAGNOSIS — Z98.891 HISTORY OF UTERINE SCAR FROM PREVIOUS SURGERY: Chronic | ICD-10-CM

## 2024-02-29 DIAGNOSIS — Z01.818 ENCOUNTER FOR OTHER PREPROCEDURAL EXAMINATION: ICD-10-CM

## 2024-02-29 PROCEDURE — G0463: CPT

## 2024-02-29 PROCEDURE — 19282 PERQ DEVICE BREAST EA IMAG: CPT

## 2024-02-29 PROCEDURE — 80048 BASIC METABOLIC PNL TOTAL CA: CPT

## 2024-02-29 PROCEDURE — 19282 PERQ DEVICE BREAST EA IMAG: CPT | Mod: LT

## 2024-02-29 PROCEDURE — 85027 COMPLETE CBC AUTOMATED: CPT

## 2024-02-29 PROCEDURE — 19281 PERQ DEVICE BREAST 1ST IMAG: CPT

## 2024-02-29 PROCEDURE — 36415 COLL VENOUS BLD VENIPUNCTURE: CPT

## 2024-02-29 PROCEDURE — 19281 PERQ DEVICE BREAST 1ST IMAG: CPT | Mod: LT

## 2024-02-29 PROCEDURE — C1739: CPT

## 2024-02-29 NOTE — H&P PST ADULT - MUSCULOSKELETAL
negative ROM intact/normal gait/strength 5/5 bilateral upper extremities/strength 5/5 bilateral lower extremities ROM intact/no calf tenderness/normal gait

## 2024-02-29 NOTE — H&P PST ADULT - PROBLEM SELECTOR PROBLEM 1
Malignant neoplasm of unspecified site of right female breast Unspecified benign mammary dysplasia of unspecified breast

## 2024-02-29 NOTE — H&P PST ADULT - NEUROLOGICAL
negative cranial nerves II-XII intact/sensation intact sensation intact/responds to pain/responds to verbal commands/no spontaneous movement

## 2024-02-29 NOTE — H&P PST ADULT - HISTORY OF PRESENT ILLNESS
42 year old female with h/o malignant neoplasm of unspecified site of right female breast, s/p right breast lumpectomy, presents to [facility] for evaluation scheduled for right axillary sentinel lymph node biopsy      Patient is a 43 year old F with PMHx of __ or no pertinent medical history presents for perioperative testing for left breast rosmery bracketed partial mastectomy, possible tissue transfer with Dr. Sylvia Reyes scheduled for 03/04/2024. Reports___. Denies ___ or any acute symptoms at this time. Patient otherwise feels well overall.    Patient is a 43 year old F with PMHx of DCIS right breast s/p radiation presents for perioperative testing for left breast rosmery bracketed partial mastectomy, possible tissue transfer with Dr. Sylvia Reyes scheduled for 03/04/2024. Reports having a mammo/sono that came back normal, then had MRI and showed atypical cell with biopsy, therefore recommended for procedure. Denies palpable breast masses, nipple discharge, rashes, nipple inversion, or any acute symptoms at this time. Patient otherwise feels well overall.

## 2024-02-29 NOTE — H&P PST ADULT - LAST STRESS TEST
RX refill request from the patient/pharmacy. Patient last seen 07- with labs, and next appt. scheduled for 01-  Requested Prescriptions     Pending Prescriptions Disp Refills    escitalopram oxalate (LEXAPRO) 10 mg tablet [Pharmacy Med Name: ESCITALOPRAM OXALATE 10 MG Tablet] 90 Tablet 3     Sig: TAKE 1 TABLET EVERY DAY   .
denies

## 2024-02-29 NOTE — H&P PST ADULT - ASSESSMENT
42 y.o. female with preop diagnosis of malignant neoplasm of unspecified site of right female breast Unspecified benign mammary dysplasia of unspecified breast

## 2024-02-29 NOTE — H&P PST ADULT - PROBLEM SELECTOR PLAN 1
pt scheduled for right axillary sentinel lymph node biopsy on 04/03/23  Preop instructions provided. Pt verbalized understanding.    written and verbal instructions with teach back on chlorhexidine shampoo provided,  pt verbalized understanding   CBC, BMP in chart, Hcg done @PST Patient provided with pre-operative instructions and verbalized understanding.  Patient will be NPO on day of surgery. Patient will stop NSAIDs, aspirin, herbal supplements or vitamins 1 week prior to surgery.  Chlorhexidine wash provided with instructions, verbalized understanding.    Tamoxifen as per heme/onc, last dose 2/18     pending CBC, BMP  EKG in chart

## 2024-02-29 NOTE — H&P PST ADULT - COMMENTS
Denies h/o or family h/o DVT, PE  Denies bleeding or clotting disorders  Denies dentures/partials, loose teeth/caps as per pt, normal

## 2024-03-03 ENCOUNTER — TRANSCRIPTION ENCOUNTER (OUTPATIENT)
Age: 44
End: 2024-03-03

## 2024-03-04 ENCOUNTER — RESULT REVIEW (OUTPATIENT)
Age: 44
End: 2024-03-04

## 2024-03-04 ENCOUNTER — OUTPATIENT (OUTPATIENT)
Dept: INPATIENT UNIT | Facility: HOSPITAL | Age: 44
LOS: 1 days | End: 2024-03-04
Payer: COMMERCIAL

## 2024-03-04 ENCOUNTER — APPOINTMENT (OUTPATIENT)
Dept: SURGICAL ONCOLOGY | Facility: HOSPITAL | Age: 44
End: 2024-03-04

## 2024-03-04 ENCOUNTER — TRANSCRIPTION ENCOUNTER (OUTPATIENT)
Age: 44
End: 2024-03-04

## 2024-03-04 VITALS
SYSTOLIC BLOOD PRESSURE: 110 MMHG | TEMPERATURE: 97 F | DIASTOLIC BLOOD PRESSURE: 71 MMHG | OXYGEN SATURATION: 96 % | HEART RATE: 95 BPM | RESPIRATION RATE: 16 BRPM

## 2024-03-04 VITALS
HEART RATE: 76 BPM | TEMPERATURE: 98 F | OXYGEN SATURATION: 99 % | HEIGHT: 64 IN | WEIGHT: 156.97 LBS | SYSTOLIC BLOOD PRESSURE: 107 MMHG | RESPIRATION RATE: 14 BRPM | DIASTOLIC BLOOD PRESSURE: 72 MMHG

## 2024-03-04 DIAGNOSIS — Z98.890 OTHER SPECIFIED POSTPROCEDURAL STATES: Chronic | ICD-10-CM

## 2024-03-04 DIAGNOSIS — N60.99 UNSPECIFIED BENIGN MAMMARY DYSPLASIA OF UNSPECIFIED BREAST: ICD-10-CM

## 2024-03-04 DIAGNOSIS — Z98.891 HISTORY OF UTERINE SCAR FROM PREVIOUS SURGERY: Chronic | ICD-10-CM

## 2024-03-04 PROCEDURE — 19301 PARTIAL MASTECTOMY: CPT | Mod: LT

## 2024-03-04 PROCEDURE — 14301 TIS TRNFR ANY 30.1-60 SQ CM: CPT

## 2024-03-04 PROCEDURE — 88342 IMHCHEM/IMCYTCHM 1ST ANTB: CPT

## 2024-03-04 PROCEDURE — 88307 TISSUE EXAM BY PATHOLOGIST: CPT | Mod: 26

## 2024-03-04 PROCEDURE — 88342 IMHCHEM/IMCYTCHM 1ST ANTB: CPT | Mod: 26

## 2024-03-04 PROCEDURE — 76098 X-RAY EXAM SURGICAL SPECIMEN: CPT | Mod: 26

## 2024-03-04 PROCEDURE — C1889: CPT

## 2024-03-04 PROCEDURE — 88341 IMHCHEM/IMCYTCHM EA ADD ANTB: CPT | Mod: 26

## 2024-03-04 PROCEDURE — 88360 TUMOR IMMUNOHISTOCHEM/MANUAL: CPT

## 2024-03-04 PROCEDURE — 19301 PARTIAL MASTECTOMY: CPT

## 2024-03-04 PROCEDURE — 88307 TISSUE EXAM BY PATHOLOGIST: CPT

## 2024-03-04 PROCEDURE — 88341 IMHCHEM/IMCYTCHM EA ADD ANTB: CPT

## 2024-03-04 PROCEDURE — 76098 X-RAY EXAM SURGICAL SPECIMEN: CPT

## 2024-03-04 DEVICE — AGENT HEMOSTATIC HEMOBLAST 1.65G 10CM: Type: IMPLANTABLE DEVICE | Site: LEFT | Status: FUNCTIONAL

## 2024-03-04 RX ORDER — ONDANSETRON 8 MG/1
4 TABLET, FILM COATED ORAL ONCE
Refills: 0 | Status: COMPLETED | OUTPATIENT
Start: 2024-03-04 | End: 2024-03-04

## 2024-03-04 RX ORDER — SODIUM CHLORIDE 9 MG/ML
1000 INJECTION, SOLUTION INTRAVENOUS
Refills: 0 | Status: DISCONTINUED | OUTPATIENT
Start: 2024-03-04 | End: 2024-03-04

## 2024-03-04 RX ORDER — HYDROMORPHONE HYDROCHLORIDE 2 MG/ML
0.5 INJECTION INTRAMUSCULAR; INTRAVENOUS; SUBCUTANEOUS
Refills: 0 | Status: DISCONTINUED | OUTPATIENT
Start: 2024-03-04 | End: 2024-03-04

## 2024-03-04 RX ORDER — IBUPROFEN 200 MG
1 TABLET ORAL
Qty: 0 | Refills: 0 | DISCHARGE
Start: 2024-03-04

## 2024-03-04 RX ORDER — IBUPROFEN 200 MG
400 TABLET ORAL ONCE
Refills: 0 | Status: COMPLETED | OUTPATIENT
Start: 2024-03-04 | End: 2024-03-04

## 2024-03-04 RX ADMIN — Medication 400 MILLIGRAM(S): at 15:30

## 2024-03-04 RX ADMIN — ONDANSETRON 4 MILLIGRAM(S): 8 TABLET, FILM COATED ORAL at 13:48

## 2024-03-04 RX ADMIN — Medication 400 MILLIGRAM(S): at 15:00

## 2024-03-04 RX ADMIN — SODIUM CHLORIDE 50 MILLILITER(S): 9 INJECTION, SOLUTION INTRAVENOUS at 08:24

## 2024-03-04 NOTE — BRIEF OPERATIVE NOTE - NSICDXBRIEFPREOP_GEN_ALL_CORE_FT
PRE-OP DIAGNOSIS:  Neoplasm of uncertain behavior of left breast 04-Mar-2024 13:41:16  Fernanda Luo

## 2024-03-04 NOTE — ASU DISCHARGE PLAN (ADULT/PEDIATRIC) - MODE OF TRANSPORTATION
Recreational Therapy  Daily Treatment     Patient Name: [facility]  AGE:  44 y.o., SEX:  male  Medical Record #: [id number]  Today's Date: 6/19/2019       Subjective    Ready and willing for session.      Objective       06/18/19 1900   Functional Ability Status - Cognitive   Attention Span Remains on Task   Comprehension Follows Three Step Commands   Judgment Able to Make Independent Decisions   Functional Ability Status - Emotional    Affect Appropriate   Mood Appropriate   Behavior Appropriate   Skilled Intervention    Skilled Intervention Gross Motor Leisure;Cognitive Leisure   Skilled Intervention Comments X Box Kinect   Interdisciplinary Plan of Care Collaboration   Patient Position at End of Therapy In Bed;Tray Table within Reach;Call Light within Reach   Treatment Time   Total Time Spent (mins) 30   Procedural Tracking   Procedural Tracking Gross Motor Functional Leisure Skills;Cognitive Skills Training       Assessment    He remained on task however was showing some slow reactions time during the gross motor activity. He was able to remain standing during the game play time 5 minutes at a time. He did not have any LOB.     Plan    Continue to have him work with the [employer] Kinect to work on gross motor and cognition. Possible outing.    Wheelchair/Stroller

## 2024-03-04 NOTE — ASU DISCHARGE PLAN (ADULT/PEDIATRIC) - CARE PROVIDER_API CALL
Reyes, Sylvia Alicia  Surgical Oncology  [address], [place] [address]  Phone: [phone]  Fax: [phone]  Established Patient  Follow Up Time: 2 weeks

## 2024-03-04 NOTE — BRIEF OPERATIVE NOTE - NSICDXBRIEFPROCEDURE_GEN_ALL_CORE_FT
PROCEDURES:  Partial mastectomy of left breast 04-Mar-2024 13:40:14 Nika bracketed Fernanda Luo  Adjacent tissue transfer, defect 30.1 sq cm to 60 sq cm 04-Mar-2024 13:40:45 6 x 10, 60 cm2 Fernanda Luo

## 2024-03-04 NOTE — ASU PATIENT PROFILE, ADULT - AS SC BRADEN SENSORY
Plan: Recommend minimum of SPF 30+ daily to the sun exposed areas. Reapply every 2 hours.
Detail Level: Zone
(4) no impairment

## 2024-03-04 NOTE — ASU DISCHARGE PLAN (ADULT/PEDIATRIC) - MEDICATION INSTRUCTIONS
Alternating scheduled Tylenol and Motrin every 6 hours for 2-3 days post-op then just as needed for pain after

## 2024-03-04 NOTE — ASU DISCHARGE PLAN (ADULT/PEDIATRIC) - NURSING INSTRUCTIONS
Drink plenty of fluids, keep all post-op follow up appointments. Call MD with any questions or concerns.

## 2024-03-04 NOTE — BRIEF OPERATIVE NOTE - NSICDXBRIEFPOSTOP_GEN_ALL_CORE_FT
POST-OP DIAGNOSIS:  Neoplasm of uncertain behavior of left breast 04-Mar-2024 13:41:30  Fernanda Luo

## 2024-03-04 NOTE — BRIEF OPERATIVE NOTE - OPERATION/FINDINGS
Left breast megan-areolar incision was made. Nika  localization was used to detect specimens, intra-operative XR was reviewed showing 3 biopsy clips (stoplight, hourglass and cork) and 2 nika scouts in the specimen, clips close to medial and medial edges so additional medial and posterior margin (taken down to muscle) were obtained.  Adjacent tissue transfer (60 cm2) was done for cosmesis. Hemostatis was obtained. Area was closed in multiple layers.

## 2024-03-13 ENCOUNTER — APPOINTMENT (OUTPATIENT)
Dept: SURGICAL ONCOLOGY | Facility: AMBULATORY SURGERY CENTER | Age: 44
End: 2024-03-13

## 2024-03-18 LAB — SURGICAL PATHOLOGY STUDY: SIGNIFICANT CHANGE UP

## 2024-03-19 ENCOUNTER — APPOINTMENT (OUTPATIENT)
Dept: SURGICAL ONCOLOGY | Facility: CLINIC | Age: 44
End: 2024-03-19
Payer: COMMERCIAL

## 2024-03-19 ENCOUNTER — APPOINTMENT (OUTPATIENT)
Dept: SURGICAL ONCOLOGY | Facility: HOSPITAL | Age: 44
End: 2024-03-19

## 2024-03-19 VITALS
DIASTOLIC BLOOD PRESSURE: 75 MMHG | OXYGEN SATURATION: 99 % | BODY MASS INDEX: 25.16 KG/M2 | HEIGHT: 65 IN | WEIGHT: 151 LBS | HEART RATE: 85 BPM | RESPIRATION RATE: 16 BRPM | SYSTOLIC BLOOD PRESSURE: 114 MMHG

## 2024-03-19 DIAGNOSIS — Z85.3 PERSONAL HISTORY OF MALIGNANT NEOPLASM OF BREAST: ICD-10-CM

## 2024-03-19 PROCEDURE — 99215 OFFICE O/P EST HI 40 MIN: CPT | Mod: 24

## 2024-03-19 NOTE — ASU PREOPERATIVE ASSESSMENT, ADULT (IPARK ONLY) - TEMPERATURE IN FAHRENHEIT (DEGREES F)
98 You can access the FollowMyHealth Patient Portal offered by [facility] by registering at the following website: [email or web address]. By joining [employer]’s FollowMyHealth portal, you will also be able to view your health information using other applications (apps) compatible with our system.

## 2024-03-25 ENCOUNTER — APPOINTMENT (OUTPATIENT)
Dept: SURGICAL ONCOLOGY | Facility: CLINIC | Age: 44
End: 2024-03-25
Payer: COMMERCIAL

## 2024-03-25 PROCEDURE — 99213 OFFICE O/P EST LOW 20 MIN: CPT | Mod: 24

## 2024-03-26 ENCOUNTER — NON-APPOINTMENT (OUTPATIENT)
Age: 44
End: 2024-03-26

## 2024-03-29 ENCOUNTER — OUTPATIENT (OUTPATIENT)
Dept: OUTPATIENT SERVICES | Facility: HOSPITAL | Age: 44
LOS: 1 days | End: 2024-03-29
Payer: COMMERCIAL

## 2024-03-29 VITALS
SYSTOLIC BLOOD PRESSURE: 110 MMHG | DIASTOLIC BLOOD PRESSURE: 80 MMHG | HEIGHT: 64.5 IN | OXYGEN SATURATION: 100 % | RESPIRATION RATE: 16 BRPM | WEIGHT: 156.97 LBS | HEART RATE: 68 BPM | TEMPERATURE: 98 F

## 2024-03-29 DIAGNOSIS — C50.919 MALIGNANT NEOPLASM OF UNSPECIFIED SITE OF UNSPECIFIED FEMALE BREAST: ICD-10-CM

## 2024-03-29 DIAGNOSIS — Z98.890 OTHER SPECIFIED POSTPROCEDURAL STATES: Chronic | ICD-10-CM

## 2024-03-29 DIAGNOSIS — Z98.891 HISTORY OF UTERINE SCAR FROM PREVIOUS SURGERY: Chronic | ICD-10-CM

## 2024-03-29 DIAGNOSIS — Z01.818 ENCOUNTER FOR OTHER PREPROCEDURAL EXAMINATION: ICD-10-CM

## 2024-03-29 PROCEDURE — G0463: CPT

## 2024-03-29 NOTE — H&P PST ADULT - RESPIRATORY
Solaraze Counseling:  I discussed with the patient the risks of Solaraze including but not limited to erythema, scaling, itching, weeping, crusting, and pain. clear to auscultation bilaterally/no wheezes/no rales/no rhonchi

## 2024-03-29 NOTE — H&P PST ADULT - HISTORY OF PRESENT ILLNESS
Patient is a 43 year old F with PMHx of DCIS right breast s/p radiation, s/p left breast-rosmery bracketed partial mastectomy, Dr. Sylvia Reyes scheduled for 03/04/2024. Pt presents for preoperative testing for scheduled lymph node biopsy.  Pathology done from partial mastectomy and pt was advised to have lymph node biopsy.  Denies palpable breast masses, nipple discharge, rashes, nipple inversion, or any acute symptoms at this time. Patient otherwise feels well overall.

## 2024-03-31 ENCOUNTER — TRANSCRIPTION ENCOUNTER (OUTPATIENT)
Age: 44
End: 2024-03-31

## 2024-04-01 ENCOUNTER — OUTPATIENT (OUTPATIENT)
Dept: INPATIENT UNIT | Facility: HOSPITAL | Age: 44
LOS: 1 days | Discharge: HOME | End: 2024-04-01
Payer: COMMERCIAL

## 2024-04-01 ENCOUNTER — RESULT REVIEW (OUTPATIENT)
Age: 44
End: 2024-04-01

## 2024-04-01 ENCOUNTER — TRANSCRIPTION ENCOUNTER (OUTPATIENT)
Age: 44
End: 2024-04-01

## 2024-04-01 ENCOUNTER — APPOINTMENT (OUTPATIENT)
Dept: SURGICAL ONCOLOGY | Facility: HOSPITAL | Age: 44
End: 2024-04-01

## 2024-04-01 VITALS
SYSTOLIC BLOOD PRESSURE: 99 MMHG | HEART RATE: 89 BPM | RESPIRATION RATE: 15 BRPM | DIASTOLIC BLOOD PRESSURE: 66 MMHG | TEMPERATURE: 98 F | OXYGEN SATURATION: 100 %

## 2024-04-01 VITALS
OXYGEN SATURATION: 100 % | SYSTOLIC BLOOD PRESSURE: 109 MMHG | RESPIRATION RATE: 13 BRPM | DIASTOLIC BLOOD PRESSURE: 68 MMHG | HEART RATE: 70 BPM | WEIGHT: 156.97 LBS | TEMPERATURE: 97 F | HEIGHT: 64.5 IN

## 2024-04-01 DIAGNOSIS — Z98.890 OTHER SPECIFIED POSTPROCEDURAL STATES: Chronic | ICD-10-CM

## 2024-04-01 DIAGNOSIS — N60.22 FIBROADENOSIS OF LEFT BREAST: ICD-10-CM

## 2024-04-01 DIAGNOSIS — Z98.891 HISTORY OF UTERINE SCAR FROM PREVIOUS SURGERY: Chronic | ICD-10-CM

## 2024-04-01 DIAGNOSIS — D05.12 INTRADUCTAL CARCINOMA IN SITU OF LEFT BREAST: ICD-10-CM

## 2024-04-01 DIAGNOSIS — C50.919 MALIGNANT NEOPLASM OF UNSPECIFIED SITE OF UNSPECIFIED FEMALE BREAST: ICD-10-CM

## 2024-04-01 DIAGNOSIS — Z79.810 LONG TERM (CURRENT) USE OF SELECTIVE ESTROGEN RECEPTOR MODULATORS (SERMS): ICD-10-CM

## 2024-04-01 PROCEDURE — 88307 TISSUE EXAM BY PATHOLOGIST: CPT

## 2024-04-01 PROCEDURE — 88342 IMHCHEM/IMCYTCHM 1ST ANTB: CPT | Mod: 26

## 2024-04-01 PROCEDURE — 88307 TISSUE EXAM BY PATHOLOGIST: CPT | Mod: 26

## 2024-04-01 PROCEDURE — 19301 PARTIAL MASTECTOMY: CPT | Mod: LT

## 2024-04-01 PROCEDURE — 88342 IMHCHEM/IMCYTCHM 1ST ANTB: CPT

## 2024-04-01 PROCEDURE — A9541: CPT

## 2024-04-01 PROCEDURE — 38900 IO MAP OF SENT LYMPH NODE: CPT | Mod: LT,58

## 2024-04-01 PROCEDURE — 38792 RA TRACER ID OF SENTINL NODE: CPT | Mod: LT,58,59

## 2024-04-01 PROCEDURE — 38525 BIOPSY/REMOVAL LYMPH NODES: CPT | Mod: LT,58

## 2024-04-01 RX ORDER — TAMOXIFEN CITRATE 20 MG/1
1 TABLET, FILM COATED ORAL
Refills: 0 | DISCHARGE

## 2024-04-01 RX ORDER — HYDROMORPHONE HYDROCHLORIDE 2 MG/ML
0.5 INJECTION INTRAMUSCULAR; INTRAVENOUS; SUBCUTANEOUS
Refills: 0 | Status: DISCONTINUED | OUTPATIENT
Start: 2024-04-01 | End: 2024-04-01

## 2024-04-01 RX ORDER — HYDROMORPHONE HYDROCHLORIDE 2 MG/ML
1 INJECTION INTRAMUSCULAR; INTRAVENOUS; SUBCUTANEOUS
Refills: 0 | Status: DISCONTINUED | OUTPATIENT
Start: 2024-04-01 | End: 2024-04-01

## 2024-04-01 RX ORDER — SODIUM CHLORIDE 9 MG/ML
1000 INJECTION, SOLUTION INTRAVENOUS
Refills: 0 | Status: DISCONTINUED | OUTPATIENT
Start: 2024-04-01 | End: 2024-04-01

## 2024-04-01 RX ORDER — ONDANSETRON 8 MG/1
4 TABLET, FILM COATED ORAL ONCE
Refills: 0 | Status: DISCONTINUED | OUTPATIENT
Start: 2024-04-01 | End: 2024-04-15

## 2024-04-01 RX ORDER — SODIUM CHLORIDE 9 MG/ML
1000 INJECTION, SOLUTION INTRAVENOUS
Refills: 0 | Status: DISCONTINUED | OUTPATIENT
Start: 2024-04-01 | End: 2024-04-15

## 2024-04-01 RX ADMIN — SODIUM CHLORIDE 50 MILLILITER(S): 9 INJECTION, SOLUTION INTRAVENOUS at 08:26

## 2024-04-01 NOTE — ASU DISCHARGE PLAN (ADULT/PEDIATRIC) - C. MAKE IMPORTANT PERSONAL OR BUSINESS DECISIONS
Statement Selected Consent: The patient's consent was obtained including but not limited to risks of crusting, scabbing, blistering, scarring, darker or lighter pigmentary change, recurrence, incomplete removal and infection. Detail Level: Zone Render Post-Care Instructions In Note?: yes Duration Of Freeze Thaw-Cycle (Seconds): 0 Post-Care Instructions: I reviewed with the patient in detail post-care instructions. Patient is to wear sunprotection, and avoid picking at any of the treated lesions. Pt may apply Vaseline to crusted or scabbing areas.

## 2024-04-01 NOTE — BRIEF OPERATIVE NOTE - OPERATION/FINDINGS
Left axillary incision was made. Rea lymph nodes localized using gamma probe with sentinel lymph node #1-2 removed. Incision closed in multiple layers

## 2024-04-01 NOTE — ASU DISCHARGE PLAN (ADULT/PEDIATRIC) - CARE PROVIDER_API CALL
Reyes, Sylvia Alicia  Surgical Oncology  [address], [place] [address]  Phone: [phone]  Fax: [phone]  Established Patient  Follow Up Time: 2 weeks

## 2024-04-05 ENCOUNTER — TRANSCRIPTION ENCOUNTER (OUTPATIENT)
Age: 44
End: 2024-04-05

## 2024-04-12 LAB — SURGICAL PATHOLOGY STUDY: SIGNIFICANT CHANGE UP

## 2024-04-15 ENCOUNTER — APPOINTMENT (OUTPATIENT)
Dept: RADIATION ONCOLOGY | Facility: CLINIC | Age: 44
End: 2024-04-15
Payer: COMMERCIAL

## 2024-04-15 VITALS
SYSTOLIC BLOOD PRESSURE: 112 MMHG | DIASTOLIC BLOOD PRESSURE: 75 MMHG | OXYGEN SATURATION: 100 % | TEMPERATURE: 97.6 F | RESPIRATION RATE: 16 BRPM | BODY MASS INDEX: 27.15 KG/M2 | HEART RATE: 76 BPM | WEIGHT: 163.14 LBS

## 2024-04-15 DIAGNOSIS — C50.919 MALIGNANT NEOPLASM OF UNSPECIFIED SITE OF UNSPECIFIED FEMALE BREAST: ICD-10-CM

## 2024-04-15 PROCEDURE — 99214 OFFICE O/P EST MOD 30 MIN: CPT

## 2024-04-16 ENCOUNTER — OUTPATIENT (OUTPATIENT)
Dept: OUTPATIENT SERVICES | Facility: HOSPITAL | Age: 44
LOS: 1 days | Discharge: HOME | End: 2024-04-16
Payer: COMMERCIAL

## 2024-04-16 ENCOUNTER — APPOINTMENT (OUTPATIENT)
Dept: SURGICAL ONCOLOGY | Facility: CLINIC | Age: 44
End: 2024-04-16
Payer: COMMERCIAL

## 2024-04-16 VITALS
OXYGEN SATURATION: 99 % | HEART RATE: 73 BPM | RESPIRATION RATE: 17 BRPM | WEIGHT: 158 LBS | HEIGHT: 65 IN | DIASTOLIC BLOOD PRESSURE: 75 MMHG | BODY MASS INDEX: 26.33 KG/M2 | SYSTOLIC BLOOD PRESSURE: 108 MMHG

## 2024-04-16 DIAGNOSIS — Z98.890 OTHER SPECIFIED POSTPROCEDURAL STATES: Chronic | ICD-10-CM

## 2024-04-16 DIAGNOSIS — Z98.891 HISTORY OF UTERINE SCAR FROM PREVIOUS SURGERY: Chronic | ICD-10-CM

## 2024-04-16 PROCEDURE — 99024 POSTOP FOLLOW-UP VISIT: CPT

## 2024-04-16 RX ORDER — TAMOXIFEN CITRATE 20 MG/1
20 TABLET, FILM COATED ORAL
Qty: 90 | Refills: 1 | Status: ACTIVE | COMMUNITY
Start: 2023-04-22 | End: 1900-01-01

## 2024-04-16 NOTE — RESULTS/DATA
[FreeTextEntry1] : BREAST PATH/RAD REVIEW [facility]:  10/20/2020 BL Dx MG/US: BIRADs 1, Heterogeneously dense  1/16/2023 BL SC MG/US: BIRADs 0, R slightly LOQ calc's  1/17/2023 R Dx MG: BIRADs 4B, R LOQ with 3 groupings of similar appearing calc's -> Rec R Stereo bx of middle grouping and lateral-most grouping, if bx is benign then rec 6 month f/u R Dx MG to confirm stability of other 2 groupings  1/19/23 R Stereo Bx (R Lower outer breast) - DCIS, intermediate nuclear grade, cribriform and micropapillary type with calc's and focal necrosis. (Top hat clip) ER: Positive ( 95%), TX: Positive (90%), Concordant  - Lobular carcinoma in situ (LCIS), classic type with calcs, Fibrocystic changes with Calc's - Note: additional clusters of faint calcifications seen both anterior and posterior to the site of biopsy spanning an entirety of up to 5 cm Anterior to posterior > Rec Breast MRI   1/31/23 Breast MRI: BIRADs 6, Biopsy proven malignancy in Right breast demonstrates no suspicious enhancement. No evidence for multifocal or multicentric disease. No lymphadenopathy   ~~~~ Images were reviewed with breast radiology. Given the proximity of the 3 areas of calcifications, localization with rosmery will be suboptimal. Will proceed with wire localization bracketing on morning of surgery.   2/27/23 s/p Right breast Wire localized x3 partial mastectomy, Tissue transfer at [facility] . pT1a  Surgical Path:  Right Breast 6:00 Lumpectomy: Well differentiated IDC ER 70%, TX 70%, HER2 0 Negative, Cristina score 4/9, Invasive tumor measures 3.5 mm, DCIS w intermediate grade nuclear atypia and central necrosis, DCIS estimated to measure 1.8 cm, LCIS classic and florid types with central necrosis. Invasive carcinoma 1 mm from inked anterior tissue edge, DCIS and Florid LCIS are present < 1 mm from the inked superior tissue edge.  Anterior margin: LCIS, classic type Superior margin: LCIS, classic type, columnar cell changes w/ calcs  Lateral margin: ADH, LCIS, classic type  Inferior margin: DCIS, measuring 2.5 mm, final margin negative (>2mm), LCIS, classic type Medial margin: ADH, LCIS, classic type, unremarkable skeletal muscle  Posterior margin: ADH, Negative LN (0/1), unremarkable skeletal muscle   4/3/2023 s/p R SLNBx (0/0) at [facility].  4/26/2023 s/p R SLNBx (0/3) w/ preoperative lymphoscintigraphy and imaging at [facility]. pN0 8/4/23 R Dx MG: BIRADs 2. HD. R central and axillary post-surgical changes seen w/ surgical clips. Few benign scattered calcs  1/8/24 Breast MRI: BIRADS 4A.  - Indeterminate L upper central 1.6 cm focal NME (Rec MR guided bx) - Multiple L foci in a clustered distribution extending posteriorly and inferiorly spanning 2.0 cm in (If above bx is benign then Rec a 6-month f/u MRI). - R post lumpectomy/post-treatment changes seen w/ mild diffuse vague asymmetric enhancement. (Rec 6-month f/u MRI & correlation w/ final surgical margins)  - Stable R posterior lower outer IM LN.   1/12/24 Left [upper central] MR core bx of NME: ADH, arising in a background of columnar cell changes w/ calcs. ALH. Hourglass Clip. Concordant. (Additional L NME is indeterminate, rec bx)  1/19/24 BL Dx MG/US: BIRADs 2. HD. Stable R mild skin thickening likely 2/2 post-radiation and/or postsurgical change. L upper slightly outer bx marker.   2/5/24 Left 2-site MRI guided bx: 1. Left anterior core bx: Benign breast tissue w/ columnar cell hyperplasia and calcs. Stoplight Clip.  2. Left posterior core bx: Focal ALH, columnar cell change w/ calcs. Cork Clip.  Both concordant (Rec Surg or oncologic management)   3/4/24 Left rosmery bracketed PM, TT (60 cm2). [facility]. pT1a pNx  1. Left PM: IDC 1.5- 2.0 mm.  Negative margins. ALH. Bx site changes. Fibroproliferative changes including radial scar, sclerosing adenosis & cysts.  ER/TX (95%), HER2 Negative  2. Left additional medial margin: Benign  3. Left additional posterior margin: DCIS 3 mm, low nuclear grade. <0.5 mm from margin. ALH.

## 2024-04-16 NOTE — HISTORY OF PRESENT ILLNESS
[de-identified] : SCOTTY ALANIZ is a 43-year F with R LOQ DCIS +/+ s/p R PM, TT on 2/2/7/23. pT1a. Final path upgraded to IDC, 3.5mm, DCIS. Final margins though negative demonstrate high risk lesions throughout (LCIS, classic type, ADH). s/p R SLNBx (0/0) on 4/3/23->s/p R SLNBx (0/3) 4/26/23. She is s/p R XRT, on tamoxifen. L upper central ADH, ALH, [Anterior NME, benign. Posterior extent ALH] s/p L bracketed PM, TT (60 cm2) on 3/4/24. pT1a pNx. L SLNBx (0/1). Here for post-op check  2/2/2023 [facility] Genetic panel: Negative 2/27 R Lumpectomy --> final path 3.5 mm IDC; preop dx DCIS 3/15/23 Met with Dr. Roland for oncoplastic reconstruction options  3/27/23 Case presented at multi-disciplinary breast tumor board to discuss final path and breast conservation vs prophylactic mastectomy. Patient opted for post- lumpectomy treatment with SLNBx 4/3/23 s/p R SLNBx at [facility], POD 10 Final path shows benign findings but no lymph nodes. Path review and re-examination was conducted with similar results.  4/24/23 Case re-presented at multidisciplinary breast tumor board with consensus agreeing with plan to re-attempt for Right sentinel lymph node biopsy with preoperative lymphoscintigraphy and intraoperative frozen path evaluation.  4/27/23 Oncotype Recurrence score is 5  4/26/23 s/p Re-attempt Right axillary sentinel lymph node biopsy (0/3) with preoperative lymphoscintigraphy and imaging on at [facility] 6/19/23 completed adjuvant radiation therapy to Right breast  7/1/23 Started adjuvant Tamoxifen 20 mg QD  8/3/23 Doing well.   No breast complaints.  No new symptoms.   Reports some hair loss since starting Tamoxifen.   2/15/24. Reports some ecchymosis after biopsy, otherwise no new breast symptoms or complaints. Tolerating tamoxifen well. Nervous about today's discussion.  3/19/24 POD #15. Denies any fevers or chills. Overall doing well post-op.  4/15/24 met w/ Dr. Kuhn

## 2024-04-16 NOTE — REASON FOR VISIT
[de-identified] : Left axillary lymph node biopsy  [de-identified] : 4/1/2024  [de-identified] : 15

## 2024-04-16 NOTE — ASSESSMENT
[FreeTextEntry1] : SCOTTY ALANIZ is a 43-year F with hx of Right breast stage IA, L2mFrX3 IDC s/p R PM, TT on 2/2/7/23. pT1a. s/p R SLNBx (0/3) 4/26/23. s/p XRT on Tamoxifen.   L upper central ADH, ALH [Hourglass].  Anterior extent of NME is benign [Stoplight], posterior extent w/ ALH [Cork]. s/p L bracketed PM, TT (60 cm2) on 3/4/24. pT1a pNx. [Path: IDC 1.5-2.0 cm. Negative margin for invasive.  Additional close posterior margin w/ DCIS, measuring 3 mm. ALH]. Most recently s/p L SLNBx (0/1) on 4/1/24. Here for post-op visit.    - HR Surveillance: 6-month f/u MRI due 7/2024.  - Med onc: Following with Dr. Day. Oncotype Dx RS 5. on adjuvant tamoxifen 20 mg QD  - Rad Onc: Following with Dr. Kuhn, s/p RT on 6/19/2023  - RTO in 7/2024 after imaging.

## 2024-04-17 DIAGNOSIS — C50.412 MALIGNANT NEOPLASM OF UPPER-OUTER QUADRANT OF LEFT FEMALE BREAST: ICD-10-CM

## 2024-04-19 ENCOUNTER — NON-APPOINTMENT (OUTPATIENT)
Age: 44
End: 2024-04-19

## 2024-04-19 PROCEDURE — 77290 THER RAD SIMULAJ FIELD CPLX: CPT | Mod: 26

## 2024-04-19 PROCEDURE — 77333 RADIATION TREATMENT AID(S): CPT | Mod: 26

## 2024-04-19 PROCEDURE — 77263 THER RADIOLOGY TX PLNG CPLX: CPT

## 2024-04-21 PROBLEM — C50.919 INVASIVE DUCTAL CARCINOMA OF BREAST: Status: ACTIVE | Noted: 2023-04-20

## 2024-04-21 NOTE — HISTORY OF PRESENT ILLNESS
[FreeTextEntry1] : Ms. Dickey is a 43-year-old female with a history of stage IA, [id number] well differentiated invasive ductal carcinoma of the right breast, ER 70%, KS 70%, and HER2 0, diagnosed in 2023. She underwent lumpectomy and had a negative sentinel lymph node biopsy. She received adjuvant radiation therapy to the breast then started tamoxifen. She has been referred back for re-valuation of a new left-sided breast cancer.   Bilateral diagnostic mammogram and breast ultrasound on 1/19/24 showed no evidence of malignancy.  Breast MRI on 1/6/24 showed a 1.6 cm indeterminant focus of nonmass enhancement in the upper central left breast. Multiple foci of clustered enhancement extending posteriorly and inferiorly from the index lesion, spanning 2 cm. Post-treatment changes were observed in the right breast.   MRI-guided biopsy of the left upper central breast on 1/12/24 showed atypical ductal hyperplasia in a background of columnar cell changes and calcifications. Atypical lobular hyperplasia was also present. On 2/5/24, MRI-guided biopsies of the anterior and posterior extent of disease were benign.   She underwent left breast lumpectomy on 3/4/24. Pathology showed invasive ductal carcinoma, grade 1, Ranson score 3/9, measuring up to 2 mm. Margins were negative. The closest margin measured 1.2 mm inferiorly. Additional medial margin was benign. Additional posterior margin contained ductal carcinoma in situ, cribriform type with low nuclear grade measuring 3 mm, located 0.5 mm from the final margin. Atypical lobular hyperplasia was present in the lumpectomy and additional posterior margin specimens. IHC showed ER %, KS 40%, and HER2 0.    She underwent left axillary sentinel lymph node biopsy on 4/1/24. Pathology showed one benign lymph node and a 0.2 mm fragment of benign lymphoid tissue.   She has been feeling generally well. She has been recuperating from surgery uneventfully and offers no complaints.

## 2024-04-21 NOTE — PHYSICAL EXAM
[] : no respiratory distress [Supraclavicular Lymph Nodes Enlarged Bilaterally] : supraclavicular [Axillary Lymph Nodes Enlarged Bilaterally] : axillary [No Focal Deficits] : no focal deficits [Sclera] : the sclera and conjunctiva were normal [Outer Ear] : the ears and nose were normal in appearance [Heart Rate And Rhythm] : heart rate and rhythm were normal [No UE Edema] : there is no upper extremity edema [Abdomen Soft] : soft [Nondistended] : nondistended [Normal] : no palpable adenopathy [de-identified] : Left lumpectomy and axillary scars are clean and dry, no erythema; right breast incisions are well healed, minimal residual hyperpigmentation

## 2024-04-21 NOTE — LETTER CLOSING
[Consult Closing:] : Thank you for allowing me to participate in the care of this patient.  If you have any questions, please do not hesitate to contact me. [Sincerely yours,] : Sincerely yours, [FreeTextEntry3] : Cristina Kuhn MD

## 2024-04-23 PROCEDURE — 77300 RADIATION THERAPY DOSE PLAN: CPT | Mod: 26

## 2024-04-23 PROCEDURE — 77334 RADIATION TREATMENT AID(S): CPT | Mod: 26

## 2024-04-23 PROCEDURE — 77295 3-D RADIOTHERAPY PLAN: CPT | Mod: 26

## 2024-04-24 ENCOUNTER — OUTPATIENT (OUTPATIENT)
Dept: OUTPATIENT SERVICES | Facility: HOSPITAL | Age: 44
LOS: 1 days | Discharge: HOME | End: 2024-04-24

## 2024-04-24 DIAGNOSIS — E34.9 ENDOCRINE DISORDER, UNSPECIFIED: ICD-10-CM

## 2024-04-24 DIAGNOSIS — Z98.890 OTHER SPECIFIED POSTPROCEDURAL STATES: Chronic | ICD-10-CM

## 2024-04-24 DIAGNOSIS — Z98.891 HISTORY OF UTERINE SCAR FROM PREVIOUS SURGERY: Chronic | ICD-10-CM

## 2024-05-03 ENCOUNTER — APPOINTMENT (OUTPATIENT)
Dept: HEMATOLOGY ONCOLOGY | Facility: CLINIC | Age: 44
End: 2024-05-03
Payer: COMMERCIAL

## 2024-05-03 VITALS
TEMPERATURE: 97 F | OXYGEN SATURATION: 99 % | WEIGHT: 163.14 LBS | SYSTOLIC BLOOD PRESSURE: 108 MMHG | DIASTOLIC BLOOD PRESSURE: 60 MMHG | BODY MASS INDEX: 27.15 KG/M2 | HEART RATE: 67 BPM | RESPIRATION RATE: 17 BRPM

## 2024-05-03 DIAGNOSIS — D05.01 LOBULAR CARCINOMA IN SITU OF RIGHT BREAST: ICD-10-CM

## 2024-05-03 DIAGNOSIS — C50.911 MALIGNANT NEOPLASM OF UNSPECIFIED SITE OF RIGHT FEMALE BREAST: ICD-10-CM

## 2024-05-03 PROCEDURE — 99215 OFFICE O/P EST HI 40 MIN: CPT

## 2024-05-03 PROCEDURE — 77280 THER RAD SIMULAJ FIELD SMPL: CPT | Mod: 26

## 2024-05-03 PROCEDURE — G2211 COMPLEX E/M VISIT ADD ON: CPT | Mod: NC,1L

## 2024-05-04 PROBLEM — C50.911 INFILTRATING DUCTAL CARCINOMA OF RIGHT FEMALE BREAST: Status: ACTIVE | Noted: 2023-03-09

## 2024-05-04 PROBLEM — D05.01 LOBULAR CARCINOMA IN SITU (LCIS) OF RIGHT BREAST: Status: ACTIVE | Noted: 2023-04-21

## 2024-05-04 NOTE — ASSESSMENT
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 Initially wished to undergo bilateral mastectomy with reconstruction but after many discussions, opted to undergo breast conservation with SLN dissection and adjuvant radiation; additional LN sampling on 4/26/23 revealed 3 LNs which were negative for carcinoma. Completed RT in June 2024; started on tamoxifen 20mg daily in 7/2023. Repeat breast imaging including MRI in 1/2024 noted new LEFT breast lesion; initial biopsy ALH/ADH. In March 2024 Underwent excision and again upstaged to IDC 1.5mm (ER+90PR40+HER2 neg); Returned to OR for LEFT SLN in April 2024 with 2 negative LNs.  - planned to proceed with Radiation therapy to the Left breast on 5/7/24 - Prior Germline Mutation testing with [facility]; Breast STAT panel and ANDREA and CHEK2 genes were negative - discussed with patient and spouse that she had recurrent invasive breast cancer in the contra-lateral breast within 1 year of her prior diagnosis despite appropriate risk reduction therapy with tamoxifen - advised her invasive component is too small to evaluate benefit of chemotherapy with Oncotype/Mammoprint/PAM50;  - could resume tamoxifen or alternatively start ovarian suppression with AI - different mechanisms of action of all agents reviewed in great detail with patient and spouse -  would consider GnRH agonist therapy with Lupron vs. oophorectomy for purposes of ovarian suppression - once clinically and by lab criteria she is determined to be post-menopausal, will initiate aromatase inhibitor such as exemestane - risks/benefits/side effects including but not limited to increased symptoms of hot flushes/night sweats associated with menopause, increased risk of osteoporosis, worsening arthralgia/myalgia, rise in LFTs that will need to be monitored every 6 months and worsening lipid profile that will need to be monitored by PMD  - will plan to present case at Multidisciplinary Tumor board on 5/6/24 - Patient had the opportunity to have all their questions answered to their satisfaction - f/u with  Dr. Graham PMD as scheduled - f/u with Dr. Sylvia Reyes (breast surgeon) as scheduled - f/u likely next week to get Lupron injection

## 2024-05-04 NOTE — PHYSICAL EXAM
[Fully active, able to carry on all pre-disease performance without restriction] : Status 0 - Fully active, able to carry on all pre-disease performance without restriction [Normal] : grossly intact [de-identified] : Right breast with healed megan-areolar surgical incision with volume loss; right SLN biopsy site well healed; ; Left breast well healed megan-areolar incision;  fibroglandular tissue; left axillary LN dissection site well healed [de-identified] : anxious but appropriate affect

## 2024-05-04 NOTE — REASON FOR VISIT
[Follow-Up Visit] : a follow-up [Spouse] : spouse [FreeTextEntry2] : Invasive Right Breast Cancer; New Invasive LEFT breast cancer

## 2024-05-04 NOTE — HISTORY OF PRESENT ILLNESS
[Disease: _____________________] : Disease: [unfilled] [T: ___] : T[unfilled] [N: ___] : N[unfilled] [M: ___] : M[unfilled] [Date: ____________] : Patient's last distress assessment performed on [unfilled]. [4 - Distress Level] : Distress Level: 4 [100: Normal, no complaints, no evidence of disease.] : 100: Normal, no complaints, no evidence of disease. [ECOG Performance Status: 0 - Fully active, able to carry on all pre-disease performance without restriction] : Performance Status: 0 - Fully active, able to carry on all pre-disease performance without restriction [de-identified] : 43 year old premenopausal woman with no personal of family history of breast or ovarian cancer who at the time of screening mammogram and US on 23 was noted to have right breast calcifications BIRADs 0.  Repeat diagnostic mammo/US rated BIRADs 4B for 3 suspicious groupings of calcification in the lower outer right breast.   On 23 Stereotactic biopsy of one of these groupings in the Right lower breast on 23 demonstrated Right breast ductal carcinoma in situ (DCIS) and Lobar carcinoma in situ (LCIS), ER: positive (95%), IN: Positive (90%).   She was seen by Breast Surgeon - Dr. Sylvia Reyes Pre-operative MRI 23 - Biopsy proven malignancy in right breast with no other suspicious enhancement and no evidence of multifocal or multicentric disease; no Lymph node involvement.  RIGHT Lumpectomy on 23 with pathologic upstaging; invasive ductal carcinoma 3.5mm noted to be 1mm from incked margin; DCIS intermediate grade 1.8cm, LCIS noted at <1mm from inked margin; all margins clear of invasive disease and DCIS. ER 70%, PR70%, Her2 neg (0 by IHC)  After several discussions with plastic surgery, she opted to proceed with Right SLN Bx on 4/3/23 which unfortunately yielded no lymphatic tissue despite 2 lymph nodes being submitted.  She underwent additional RIGHT axillary dissection on 23 which revealed 3 LNs with no evidence of malignancy and ultimately Radiation Oncology evaluation as well as part of her Breast Conservation Therapy. Followed by adjuvant radiation therapy to the RIGHT breast with Dr. Kuhn 4240 cG and 1000cGy boost  --> 23.  Risk - , 1st full term pregnancy at age 33, no OCPs; no fertility therapy; maternal aunt with history of breast cancer; mother with lung cancer; father esophageal/throat cancer in his 50s.   [de-identified] : 3.5mm invasive; DCIS 1.8cm, LCIS [de-identified] : ER+ CT+ Her2 Negative [de-identified] : 5/3/24 Patient returns today to rule out progression of breast cancer and to assess treatment toxicity.  Started Tamoxifen7/1/23 -  tolerating well  In late August 2023  she developed right chest wall rash in the radiation field concerning for tamoxifen induced radiation recall; had biopsy done c/w spongiotic dermatitis; self limited and has completely resolved  MRI 1/8/2024- LEFT breast 1.6cm indeterminant focal nonmass enhancement upper central portion. Multiple foci in a clustered distribution seen extending posteriorly and inferiorly from the aforementioned span finding in the left breast spanning a distance of 2 cm.  New postlumpectomy and posttreatment changes right breast, with mild diffuse vague asymmetric enhancement of the right breast likely secondary to postsurgical and/post treatment change.  MRI guided biopsy LEFT breast on 1/12/24 - ADH and ALH After seeking second opinion at [facility], underwent RIGHT lumpectomy with Dr. Reyes at [facility] on 3/4/24 with 1.5-2mm left breast Infiltrating ductal carcinoma Grade 1, ALH and 1.5 mm from inferior margin; ER+%, [place]+40%, HER2 neg (IHC 0) Returned to the OR for SLN sampling of the left axilla; 2 negative lymph nodes  She was seen by Dr. Cristina Kuhn; planned for RT to the LEFT breast to start on 5/6/24. Presents to discuss additional adjuvant therapy  Patient denies any SOB, CP, abdominal pain, bone pain, headache, or unexplained weight loss Patient denies any breast masses,  skin changes or nipple discharge. Patient denies any hotflashes, arthralgias, vaginal dryness, vaginal bleeding, hair loss, muscle cramps.   Last Mammo/sono - 1/2024 [facility] MRI - 1/2024 Last BMD - N/A GI - last colonoscopy N/A GYN - Dr Brandy Marquez  - 10/2023 PCP -  Dr Qi Graham - had annual physical appointment in 11/2023  [FreeTextEntry7] : following with therapist

## 2024-05-06 PROCEDURE — G6017: CPT

## 2024-05-06 PROCEDURE — 77427 RADIATION TX MANAGEMENT X5: CPT

## 2024-05-07 PROCEDURE — G6017: CPT

## 2024-05-08 ENCOUNTER — NON-APPOINTMENT (OUTPATIENT)
Age: 44
End: 2024-05-08

## 2024-05-08 PROBLEM — Z85.3 HISTORY OF RIGHT BREAST CANCER: Status: ACTIVE | Noted: 2023-08-02

## 2024-05-08 PROCEDURE — G6017: CPT

## 2024-05-08 NOTE — RESULTS/DATA
[FreeTextEntry1] : BREAST PATH/RAD REVIEW [facility]:  10/20/2020 BL Dx MG/US: BIRADs 1, Heterogeneously dense  1/16/2023 BL SC MG/US: BIRADs 0, R slightly LOQ calc's  1/17/2023 R Dx MG: BIRADs 4B, R LOQ with 3 groupings of similar appearing calc's -> Rec R Stereo bx of middle grouping and lateral-most grouping, if bx is benign then rec 6 month f/u R Dx MG to confirm stability of other 2 groupings  1/19/23 R Stereo Bx (R Lower outer breast) - DCIS, intermediate nuclear grade, cribriform and micropapillary type with calc's and focal necrosis. (Top hat clip) ER: Positive ( 95%), IN: Positive (90%), Concordant  - Lobular carcinoma in situ (LCIS), classic type with calcs, Fibrocystic changes with Calc's - Note: additional clusters of faint calcifications seen both anterior and posterior to the site of biopsy spanning an entirety of up to 5 cm Anterior to posterior > Rec Breast MRI   1/31/23 Breast MRI: BIRADs 6, Biopsy proven malignancy in Right breast demonstrates no suspicious enhancement. No evidence for multifocal or multicentric disease. No lymphadenopathy   ~~~~ Images were reviewed with breast radiology. Given the proximity of the 3 areas of calcifications, localization with rosmery will be suboptimal. Will proceed with wire localization bracketing on morning of surgery.   2/27/23 s/p Right breast Wire localized x3 partial mastectomy, Tissue transfer at [facility] . pT1a  Surgical Path:  Right Breast 6:00 Lumpectomy: Well differentiated IDC ER 70%, IN 70%, HER2 0 Negative, Cristina score 4/9, Invasive tumor measures 3.5 mm, DCIS w intermediate grade nuclear atypia and central necrosis, DCIS estimated to measure 1.8 cm, LCIS classic and florid types with central necrosis. Invasive carcinoma 1 mm from inked anterior tissue edge, DCIS and Florid LCIS are present < 1 mm from the inked superior tissue edge.  Anterior margin: LCIS, classic type Superior margin: LCIS, classic type, columnar cell changes w/ calcs  Lateral margin: ADH, LCIS, classic type  Inferior margin: DCIS, measuring 2.5 mm, final margin negative (>2mm), LCIS, classic type Medial margin: ADH, LCIS, classic type, unremarkable skeletal muscle  Posterior margin: ADH, Negative LN (0/1), unremarkable skeletal muscle   4/3/2023 s/p R SLNBx (0/0) at [facility].  4/26/2023 s/p R SLNBx (0/3) w/ preoperative lymphoscintigraphy and imaging at [facility]. pN0 8/4/23 R Dx MG: BIRADs 2. HD. R central and axillary post-surgical changes seen w/ surgical clips. Few benign scattered calcs  1/8/24 Breast MRI: BIRADS 4A.  - Indeterminate L upper central 1.6 cm focal NME (Rec MR guided bx) - Multiple L foci in a clustered distribution extending posteriorly and inferiorly spanning 2.0 cm in (If above bx is benign then Rec a 6-month f/u MRI). - R post lumpectomy/post-treatment changes seen w/ mild diffuse vague asymmetric enhancement. (Rec 6-month f/u MRI & correlation w/ final surgical margins)  - Stable R posterior lower outer IM LN.   1/12/24 Left [upper central] MR core bx of NME: ADH, arising in a background of columnar cell changes w/ calcs. ALH. Hourglass Clip. Concordant. (Additional L NME is indeterminate, rec bx)  1/19/24 BL Dx MG/US: BIRADs 2. HD. Stable R mild skin thickening likely 2/2 post-radiation and/or postsurgical change. L upper slightly outer bx marker.   2/5/24 Left 2-site MRI guided bx: 1. Left anterior core bx: Benign breast tissue w/ columnar cell hyperplasia and calcs. Stoplight Clip.  2. Left posterior core bx: Focal ALH, columnar cell change w/ calcs. Cork Clip.  Both concordant (Rec Surg or oncologic management)   3/4/24 Left rosmery bracketed PM, TT (60 cm2). [facility]. pT1a pNx  1. Left PM: IDC 1.5- 2.0 mm.  Negative margins. ALH. Bx site changes. Fibroproliferative changes including radial scar, sclerosing adenosis & cysts.  ER/IN (95%), HER2 (IHC) pending.   2. Left additional medial margin: Benign  3. Left additional posterior margin: DCIS 3 mm, low nuclear grade. <0.5 mm from margin. ALH.

## 2024-05-08 NOTE — PHYSICAL EXAM
[Normal] : well developed, well nourished, in no acute distress [de-identified] : Left lumpectomy and axillary scars are well healed, no erythema, no hyperpigmentation.

## 2024-05-08 NOTE — ASSESSMENT
[FreeTextEntry1] : SCOTTY ALANIZ is a 43-year F with R LOQ DCIS +/+, dx'ed in1/2023, s/p R wire loc x3 PM, TT on 2/2/7/23. pT1a. Final path upgraded to IDC, 3.5mm, and DCIS. Final margins though negative demonstrate high risk lesions throughout (LCIS, classic type, ADH). s/p R SLNBx (0/0) on 4/3/23->s/p R SLNBx (0/3) 4/26/23. She is s/p R XRT.  L upper central ADH, ALH [Hourglass].  Anterior extent of L upper central NME is benign [Stoplight], posterior extent w/ focal ALH w/ calcs [Cork]. She is s/p L bracketed PM, TT (60 cm2) on 3/4/24. pT1a pNx. Here for post-op visit.   Recovering well after surgery.  We discussed her surgical Pathology with upgrade to IDC measuring 1.5-2.0 mm. Negative margins. ER/[place] (95%), HER2 (IHC) pending.  Additional posterior margin w/ DCIS, measuring 3 mm, <0.5 mm from margin. ALH.   Final posterior margin is muscle -no further excision required.    We had a lengthy discussion regarding her new diagnosis of Left breast cancer, her breast imaging, staging, surgical options and adjuvant treatments. We discussed the need for axillary surgery [SLNB] for clinical staging and we discussed the options of post-lumpectomy RT vs mastectomy with no RT.   We discussed prognosis and recurrence risk.   Considering BL mastectomy.   Will consider options and meet in 1 week to discuss final plan.  - f/u [facility] on surgical pathology.  - HR Surveillance: 6-month f/u MRI due 7/2024.  - Med onc: Following with Dr. Day. Oncotype Dx RS 5. on adjuvant tamoxifen 20 mg QD  - Rad Onc: Following with Dr. Kuhn, s/p RT on 6/19/2023  - RTO for televisit in 1 week to discuss surgical plan.

## 2024-05-08 NOTE — DISEASE MANAGEMENT
[Pathological] : TNM Stage: p [IA] : IA [TTNM] : 1a [NTNM] : 0 [MTNM] : 0 [de-identified] : 797sBc [de-identified] : 5240cGy [de-identified] : Left breast

## 2024-05-08 NOTE — HISTORY OF PRESENT ILLNESS
[FreeTextEntry1] : Ms. Dickey is a 43-year-old female with stage IA T1aN0(sn)M0 invasive ductal carcinoma of the left breast, ER %, [place] 40%, and HER2 0. She underwent lumpectomy with negative margins and had a negative sentinel node biopsy. She just completed breast conserving therapy for a small right-sided breast cancer last year. She is receiving adjuvant radiation therapy to the left breast. She presents for an OTV.  She is feeling generally well. She denies fatigue and pain. She has not noted any skin reactions. She is using Aquaphor on the skin.

## 2024-05-08 NOTE — HISTORY OF PRESENT ILLNESS
[de-identified] : SCOTTY ALANIZ is a 43-year F with R LOQ DCIS +/+, dx'ed 1/2023, s/p R wire loc x3 PM, TT on 2/2/7/23. pT1a. Final path upgraded to IDC, 3.5mm, DCIS. Final margins though negative demonstrate high risk lesions throughout (LCIS, classic type, ADH). s/p R SLNBx (0/0) on 4/3/23->s/p R SLNBx (0/3) 4/26/23. She is s/p R XRT, on tamoxifen. L upper central ADH, ALH, [Anterior NME, benign. Posterior extent ALH] s/p L bracketed PM, TT (60 cm2) on 3/4/24. pT1a pNx. Here for post-op visit.   2/2/2023 [facility] Genetic panel: Negative 2/27 R Lumpectomy --> final path 3.5 mm IDC; preop dx DCIS 3/15/23 Met with Dr. Roland for oncoplastic reconstruction options  3/27/23 Case presented at multi-disciplinary breast tumor board to discuss final path and breast conservation vs prophylactic mastectomy. Patient opted for post- lumpectomy treatment with SLNBx 4/3/23 s/p R SLNBx at [facility], POD 10 Final path shows benign findings but no lymph nodes. Path review and re-examination was conducted with similar results.  4/24/23 Case re-presented at multidisciplinary breast tumor board with consensus agreeing with plan to re-attempt for Right sentinel lymph node biopsy with preoperative lymphoscintigraphy and intraoperative frozen path evaluation.  4/27/23 Oncotype Recurrence score is 5  4/26/23 s/p Re-attempt Right axillary sentinel lymph node biopsy (0/3) with preoperative lymphoscintigraphy and imaging on at [facility] 6/19/23 completed adjuvant radiation therapy to Right breast  7/1/23 Started adjuvant Tamoxifen 20 mg QD  8/3/23 Doing well.   No breast complaints.  No new symptoms.   Reports some hair loss since starting Tamoxifen.   2/15/24. Reports some ecchymosis after biopsy, otherwise no new breast symptoms or complaints. Tolerating tamoxifen well. Nervous about today's discussion.  3/4/24 Surg:  L PM, TT(60cm2) 3/19/24 POD #15. Denies any fevers or chills. Overall doing well post-op.

## 2024-05-08 NOTE — REASON FOR VISIT
[de-identified] : Left breast rosmery bracketed PM, TT (60 cm 2)  [de-identified] : 3/4/24 [de-identified] : 15

## 2024-05-08 NOTE — PHYSICAL EXAM
[Normal Axillary Lymph Nodes] : normal axillary lymph nodes [Normal] : oriented to person, place and time, with appropriate affect [de-identified] : Left breast surgical incision is c/d/i, well-approximated, no hematoma/seroma, no evidence of infection, no parenchymal defect [de-identified] : Normal respiratory effort

## 2024-05-09 PROCEDURE — G6017: CPT

## 2024-05-10 ENCOUNTER — RESULT REVIEW (OUTPATIENT)
Age: 44
End: 2024-05-10

## 2024-05-10 ENCOUNTER — OUTPATIENT (OUTPATIENT)
Dept: OUTPATIENT SERVICES | Facility: HOSPITAL | Age: 44
LOS: 1 days | End: 2024-05-10
Payer: COMMERCIAL

## 2024-05-10 DIAGNOSIS — Z98.890 OTHER SPECIFIED POSTPROCEDURAL STATES: Chronic | ICD-10-CM

## 2024-05-10 DIAGNOSIS — Z98.891 HISTORY OF UTERINE SCAR FROM PREVIOUS SURGERY: Chronic | ICD-10-CM

## 2024-05-10 DIAGNOSIS — N60.82 OTHER BENIGN MAMMARY DYSPLASIAS OF LEFT BREAST: ICD-10-CM

## 2024-05-10 PROCEDURE — 88342 IMHCHEM/IMCYTCHM 1ST ANTB: CPT | Mod: 26

## 2024-05-10 PROCEDURE — 88341 IMHCHEM/IMCYTCHM EA ADD ANTB: CPT | Mod: 26

## 2024-05-10 PROCEDURE — 88341 IMHCHEM/IMCYTCHM EA ADD ANTB: CPT

## 2024-05-10 PROCEDURE — G6017: CPT

## 2024-05-10 PROCEDURE — 88342 IMHCHEM/IMCYTCHM 1ST ANTB: CPT

## 2024-05-13 LAB — SURGICAL PATHOLOGY STUDY: SIGNIFICANT CHANGE UP

## 2024-05-13 PROCEDURE — 77427 RADIATION TX MANAGEMENT X5: CPT

## 2024-05-13 PROCEDURE — G6017: CPT

## 2024-05-14 PROCEDURE — G6017: CPT

## 2024-05-15 ENCOUNTER — NON-APPOINTMENT (OUTPATIENT)
Age: 44
End: 2024-05-15

## 2024-05-15 PROCEDURE — G6017: CPT

## 2024-05-16 ENCOUNTER — NON-APPOINTMENT (OUTPATIENT)
Age: 44
End: 2024-05-16

## 2024-05-16 PROCEDURE — G6017: CPT

## 2024-05-17 PROCEDURE — G6017: CPT

## 2024-05-19 NOTE — DISEASE MANAGEMENT
[TTNM] : 1a [NTNM] : 0 [MTNM] : 0 [de-identified] : 5220pGy [de-identified] : 5240cGy [de-identified] : Left breast

## 2024-05-19 NOTE — HISTORY OF PRESENT ILLNESS
[FreeTextEntry1] : Ms. Dickey is a 43-year-old female with stage IA T1aN0(sn)M0 invasive ductal carcinoma of the left breast, ER %, [place] 40%, and HER2 0. She underwent lumpectomy with negative margins and had a negative sentinel node biopsy. She just completed breast conserving therapy for a small right-sided breast cancer last year. She is receiving adjuvant radiation therapy to the left breast. She presents for an OTV.  She is feeling generally well. She denies fatigue and pain. She has not noted any skin reactions. She is using Aquaphor on the skin.

## 2024-05-19 NOTE — PHYSICAL EXAM
[de-identified] : Left lumpectomy and axillary scars are well healed, no erythema, no hyperpigmentation.

## 2024-05-20 PROCEDURE — G6017: CPT

## 2024-05-20 PROCEDURE — 77427 RADIATION TX MANAGEMENT X5: CPT

## 2024-05-21 PROCEDURE — G6017: CPT

## 2024-05-22 ENCOUNTER — NON-APPOINTMENT (OUTPATIENT)
Age: 44
End: 2024-05-22

## 2024-05-22 PROCEDURE — G6017: CPT

## 2024-05-23 ENCOUNTER — NON-APPOINTMENT (OUTPATIENT)
Age: 44
End: 2024-05-23

## 2024-05-23 PROCEDURE — G6017: CPT

## 2024-05-23 NOTE — HISTORY OF PRESENT ILLNESS
[FreeTextEntry1] : Ms. Dickey is a 43-year-old female with stage IA T1aN0(sn)M0 invasive ductal carcinoma of the left breast, ER %, [place] 40%, and HER2 0. She underwent lumpectomy with negative margins and had a negative sentinel node biopsy. She just completed breast conserving therapy for a small right-sided breast cancer last year. She is receiving adjuvant radiation therapy to the left breast. She presents for an OTV.  The patient present for OTV,day 14/16fx. She is feeling generally well. She denies fatigue and pain. She reports mild redness to the left breast. She is using Aquaphor on the skin.  No other complaints.

## 2024-05-23 NOTE — DISEASE MANAGEMENT
[Pathological] : TNM Stage: p [TTNM] : 1a [NTNM] : 0 [MTNM] : 0 [IA] : IA [de-identified] : cGy [de-identified] : 5240cGy [de-identified] : Left breast

## 2024-05-24 PROCEDURE — 77280 THER RAD SIMULAJ FIELD SMPL: CPT | Mod: 26

## 2024-05-24 PROCEDURE — G6017: CPT

## 2024-05-24 NOTE — HISTORY OF PRESENT ILLNESS
[FreeTextEntry1] : Ms. Dickey is a 43-year-old female with stage IA T1aN0(sn)M0 invasive ductal carcinoma of the left breast, ER %, [place] 40%, and HER2 0. She underwent lumpectomy with negative margins and had a negative sentinel node biopsy. She is receiving adjuvant radiation therapy to the left breast. She presents for an OTV.  She is feeling generally well. She denies fatigue and pain. She has not noted any skin reactions. She is using Aquaphor on the skin.

## 2024-05-24 NOTE — DISEASE MANAGEMENT
[TTNM] : 1a [NTNM] : 0 [MTNM] : 0 [de-identified] : cGy [de-identified] : 5240cGy [de-identified] : Left breast

## 2024-05-24 NOTE — PHYSICAL EXAM
[de-identified] : Left lumpectomy and axillary scars are well healed, mild erythema and hyperpigmentation.

## 2024-05-28 PROCEDURE — 77427 RADIATION TX MANAGEMENT X5: CPT

## 2024-05-28 PROCEDURE — G6017: CPT

## 2024-05-29 ENCOUNTER — NON-APPOINTMENT (OUTPATIENT)
Age: 44
End: 2024-05-29

## 2024-05-29 PROCEDURE — G6017: CPT

## 2024-05-29 NOTE — REVIEW OF SYSTEMS
[Alopecia: Grade 0] : Alopecia: Grade 0 [Pruritus: Grade 0] : Pruritus: Grade 0 [Skin Hyperpigmentation: Grade 1 - Hyperpigmentation covering <10% BSA; no psychosocial impact] : Skin Hyperpigmentation: Grade 1 - Hyperpigmentation covering <10% BSA; no psychosocial impact [Skin Atrophy: Grade 0] : Skin Atrophy: Grade 0 [Skin Induration: Grade 0] : Skin Induration: Grade 0 [Dermatitis Radiation: Grade 1 - Faint erythema or dry desquamation] : Dermatitis Radiation: Grade 1 - Faint erythema or dry desquamation

## 2024-05-30 PROCEDURE — G6017: CPT

## 2024-05-30 NOTE — HISTORY OF PRESENT ILLNESS
[FreeTextEntry1] : Ms. Dickey is a 43-year-old female with stage IA T1aN0(sn)M0 invasive ductal carcinoma of the left breast, ER %, IA 40%, and HER2 0. She underwent lumpectomy with negative margins and had a negative sentinel node biopsy. She is receiving adjuvant radiation therapy to the left breast. She presents for an OTV.  She is feeling generally well. She notes fatigue but remains busy and active. She has occasional pain in the breast but does not require analgesics. She has noted increasing redness of the skin. She is using Aquaphor on the skin.

## 2024-05-30 NOTE — DISEASE MANAGEMENT
[Pathological] : TNM Stage: p [IA] : IA [TTNM] : 1a [NTNM] : 0 [MTNM] : 0 [de-identified] : 0525hRb [de-identified] : 5240cGy [de-identified] : Left breast

## 2024-05-30 NOTE — PHYSICAL EXAM
[Normal] : well developed, well nourished, in no acute distress [de-identified] : Left lumpectomy and axillary scars are well healed, mild erythema and hyperpigmentation.

## 2024-05-31 PROCEDURE — G6017: CPT

## 2024-06-03 ENCOUNTER — NON-APPOINTMENT (OUTPATIENT)
Age: 44
End: 2024-06-03

## 2024-06-03 PROCEDURE — G6017: CPT

## 2024-06-04 NOTE — RESULTS/DATA
[FreeTextEntry1] : BREAST PATH/RAD REVIEW [facility]:  10/20/2020 BL Dx MG/US: BIRADs 1, Heterogeneously dense  1/16/2023 BL SC MG/US: BIRADs 0, R slightly LOQ calc's  1/17/2023 R Dx MG: BIRADs 4B, R LOQ with 3 groupings of similar appearing calc's -> Rec R Stereo bx of middle grouping and lateral-most grouping, if bx is benign then rec 6 month f/u R Dx MG to confirm stability of other 2 groupings  1/19/23 R Stereo Bx (R Lower outer breast) - DCIS, intermediate nuclear grade, cribriform and micropapillary type with calc's and focal necrosis. (Top hat clip) ER: Positive ( 95%), IL: Positive (90%), Concordant  - Lobular carcinoma in situ (LCIS), classic type with calcs, Fibrocystic changes with Calc's - Note: additional clusters of faint calcifications seen both anterior and posterior to the site of biopsy spanning an entirety of up to 5 cm Anterior to posterior > Rec Breast MRI   1/31/23 Breast MRI: BIRADs 6, Biopsy proven malignancy in Right breast demonstrates no suspicious enhancement. No evidence for multifocal or multicentric disease. No lymphadenopathy   ~~~~ Images were reviewed with breast radiology. Given the proximity of the 3 areas of calcifications, localization with rosmery will be suboptimal. Will proceed with wire localization bracketing on morning of surgery.   2/27/23 s/p Right breast Wire localized x3 partial mastectomy, Tissue transfer at [facility] . pT1a  Surgical Path:  Right Breast 6:00 Lumpectomy: Well differentiated IDC ER 70%, IL 70%, HER2 0 Negative, Cristina score 4/9, Invasive tumor measures 3.5 mm, DCIS w intermediate grade nuclear atypia and central necrosis, DCIS estimated to measure 1.8 cm, LCIS classic and florid types with central necrosis. Invasive carcinoma 1 mm from inked anterior tissue edge, DCIS and Florid LCIS are present < 1 mm from the inked superior tissue edge.  Anterior margin: LCIS, classic type Superior margin: LCIS, classic type, columnar cell changes w/ calcs  Lateral margin: ADH, LCIS, classic type  Inferior margin: DCIS, measuring 2.5 mm, final margin negative (>2mm), LCIS, classic type Medial margin: ADH, LCIS, classic type, unremarkable skeletal muscle  Posterior margin: ADH, Negative LN (0/1), unremarkable skeletal muscle   4/3/2023 s/p R SLNBx (0/0) at [facility].  4/26/2023 s/p R SLNBx (0/3) w/ preoperative lymphoscintigraphy and imaging at [facility]. pN0 8/4/23 R Dx MG: BIRADs 2. HD. R central and axillary post-surgical changes seen w/ surgical clips. Few benign scattered calcs  1/8/24 Breast MRI: BIRADS 4A.  - Indeterminate L upper central 1.6 cm focal NME (Rec MR guided bx) - Multiple L foci in a clustered distribution extending posteriorly and inferiorly spanning 2.0 cm in (If above bx is benign then Rec a 6-month f/u MRI). - R post lumpectomy/post-treatment changes seen w/ mild diffuse vague asymmetric enhancement. (Rec 6-month f/u MRI & correlation w/ final surgical margins)  - Stable R posterior lower outer IM LN.   1/12/24 Left [upper central] MR core bx of NME: ADH, arising in a background of columnar cell changes w/ calcs. ALH. Hourglass Clip. Concordant. (Additional L NME is indeterminate, rec bx)  1/19/24 BL Dx MG/US: BIRADs 2. HD. Stable R mild skin thickening likely 2/2 post-radiation and/or postsurgical change. L upper slightly outer bx marker.   2/5/24 Left 2-site MRI guided bx: 1. Left anterior core bx: Benign breast tissue w/ columnar cell hyperplasia and calcs. Stoplight Clip.  2. Left posterior core bx: Focal ALH, columnar cell change w/ calcs. Cork Clip.  Both concordant (Rec Surg or oncologic management)   3/4/24 Left rosmery bracketed PM, TT (60 cm2). [facility]. pT1a pNx  1. Left PM: IDC 1.5- 2.0 mm.  Negative margins. ALH. Bx site changes. Fibroproliferative changes including radial scar, sclerosing adenosis & cysts.  ER/IL (95%), HER2 Negative  2. Left additional medial margin: Benign  3. Left additional posterior margin: DCIS 3 mm, low nuclear grade. <0.5 mm from margin. ALH.

## 2024-06-04 NOTE — HISTORY OF PRESENT ILLNESS
[de-identified] : This visit was provided via telehealth using real-time 2-way audio visual technology. The patient, SCOTTY ALANIZ, was located at home [address] [place] [address] at the time of the visit. This provider was located at the clinic in [place], [place] at the time of the visit. The provider, patient participated in the telehealth encounter.  Verbal consent was given Mar 25 2024 2:00PM by the patient.  SCOTTY ALANIZ is a 43-year F with R LOQ DCIS +/+, dx'ed 1/2023, s/p R wire loc x3 PM, TT on 2/2/7/23. pT1a. Final path upgraded to IDC, 3.5mm, DCIS. Final margins though negative demonstrate high risk lesions throughout (LCIS, classic type, ADH). s/p R SLNBx (0/0) on 4/3/23->s/p R SLNBx (0/3) 4/26/23. She is s/p R XRT, on tamoxifen. L upper central ADH, ALH, [Anterior NME, benign. Posterior extent ALH] s/p L bracketed PM, TT (60 cm2) on 3/4/24. pT1a pNx. Here for surgical planning   2/2/2023 [facility] Genetic panel: Negative 2/27 R Lumpectomy --> final path 3.5 mm IDC; preop dx DCIS 3/15/23 Met with Dr. Roland for oncoplastic reconstruction options  3/27/23 Case presented at multi-disciplinary breast tumor board to discuss final path and breast conservation vs prophylactic mastectomy. Patient opted for post- lumpectomy treatment with SLNBx 4/3/23 s/p R SLNBx at [facility], POD 10 Final path shows benign findings but no lymph nodes. Path review and re-examination was conducted with similar results.  4/24/23 Case re-presented at multidisciplinary breast tumor board with consensus agreeing with plan to re-attempt for Right sentinel lymph node biopsy with preoperative lymphoscintigraphy and intraoperative frozen path evaluation.  4/27/23 Oncotype Recurrence score is 5  4/26/23 s/p Re-attempt Right axillary sentinel lymph node biopsy (0/3) with preoperative lymphoscintigraphy and imaging on at [facility] 6/19/23 completed adjuvant radiation therapy to Right breast  7/1/23 Started adjuvant Tamoxifen 20 mg QD  8/3/23 Doing well.   No breast complaints.  No new symptoms.   Reports some hair loss since starting Tamoxifen.   2/15/24. Reports some ecchymosis after biopsy, otherwise no new breast symptoms or complaints. Tolerating tamoxifen well. Nervous about today's discussion.  3/19/24 POD #15. Denies any fevers or chills. Overall doing well post-op.  3/25/24 Televideo visit:  She reports concerns regarding surgical options of mastectomy vs lumpectomy.  expressed desire for breast preservation and has discussed options with family and friends.

## 2024-06-04 NOTE — ASSESSMENT
[FreeTextEntry1] : SCOTTY ALANIZ presents for discussion of final surgical planning for new left breast cancer, h/o right breast cancer.   43-year F with R LOQ DCIS +/+, dx'ed in1/2023, s/p R wire loc x3 PM, TT on 2/2/7/23. pT1a. Final path upgraded to IDC, 3.5mm, and DCIS. Final margins though negative demonstrate high risk lesions throughout (LCIS, classic type, ADH). s/p R SLNBx (0/0) on 4/3/23->s/p R SLNBx (0/3) 4/26/23. s/p R XRT on Tamoxifen.   L upper central ADH, ALH [Hourglass].  Anterior extent of NME is benign [Stoplight], posterior extent w/ focal ALH w/ calcs [Cork]. s/p L bracketed PM, TT (60 cm2) on 3/4/24. pT1a pNx. [Path: IDC 1.5-2.0 cm. Negative margin for invasive.  Additional posterior margin w/ DCIS, measuring 3 mm, <0.5 mm from margin. ALH. ER/TX (+) HER2 (-).   We had a lengthy discussion regarding her new diagnosis of Left breast cancer, her breast imaging, staging, surgical options and adjuvant treatments. We again discussed the need for axillary surgery [SLNB] for clinical staging and we discussed the options of post-lumpectomy RT vs mastectomy with no RT. We discussed prognosis and recurrence risk. She prefers breast conservation and will proceed with Left SLNB.    Surgical Plan:   Left [place] lymph node biopsy Tentative surgical date:  4/1/24  - HR Surveillance: 6-month f/u MRI due 7/2024.  - Med onc: Following with Dr. Day. Oncotype Dx RS 5. on adjuvant tamoxifen 20 mg QD  - Rad Onc: Following with Dr. Kuhn, s/p RT on 6/19/2023.   Will see again after surgery.   - RTO for postop visit.

## 2024-06-05 NOTE — HISTORY OF PRESENT ILLNESS
[FreeTextEntry1] : Ms. Dickey is a 43-year-old female with stage IA T1aN0(sn)M0 invasive ductal carcinoma of the left breast, ER %, [place] 40%, and HER2 0. She underwent lumpectomy with negative margins and had a negative sentinel node biopsy. She is receiving adjuvant radiation therapy to the left breast. She presents for an OTV.  She is feeling generally well. She notes fatigue but remains busy and active. She has occasional pain in the breast but does not require analgesics. She has noted increasing redness of the skin. She is using Aquaphor on the skin.

## 2024-06-05 NOTE — DISEASE MANAGEMENT
[Pathological] : TNM Stage: p [IA] : IA [TTNM] : 1a [NTNM] : 0 [MTNM] : 0 [de-identified] : 5240cGy [de-identified] : 5240cGy [de-identified] : Left breast

## 2024-06-05 NOTE — PHYSICAL EXAM
[Normal] : well developed, well nourished, in no acute distress [de-identified] : Left lumpectomy and axillary scars are well healed, moderate erythema and hyperpigmentation, no desquamation.

## 2024-06-10 ENCOUNTER — NON-APPOINTMENT (OUTPATIENT)
Age: 44
End: 2024-06-10

## 2024-06-19 ENCOUNTER — NON-APPOINTMENT (OUTPATIENT)
Age: 44
End: 2024-06-19

## 2024-06-20 ENCOUNTER — APPOINTMENT (OUTPATIENT)
Dept: INFUSION THERAPY | Facility: HOSPITAL | Age: 44
End: 2024-06-20

## 2024-06-20 ENCOUNTER — APPOINTMENT (OUTPATIENT)
Dept: HEMATOLOGY ONCOLOGY | Facility: CLINIC | Age: 44
End: 2024-06-20
Payer: COMMERCIAL

## 2024-06-20 ENCOUNTER — RESULT REVIEW (OUTPATIENT)
Age: 44
End: 2024-06-20

## 2024-06-20 VITALS
TEMPERATURE: 97.8 F | RESPIRATION RATE: 16 BRPM | DIASTOLIC BLOOD PRESSURE: 74 MMHG | SYSTOLIC BLOOD PRESSURE: 112 MMHG | OXYGEN SATURATION: 99 % | BODY MASS INDEX: 26.96 KG/M2 | WEIGHT: 162.04 LBS | HEART RATE: 88 BPM

## 2024-06-20 DIAGNOSIS — C50.912 MALIGNANT NEOPLASM OF UNSPECIFIED SITE OF LEFT FEMALE BREAST: ICD-10-CM

## 2024-06-20 DIAGNOSIS — Z79.899 OTHER LONG TERM (CURRENT) DRUG THERAPY: ICD-10-CM

## 2024-06-20 LAB
BASOPHILS # BLD AUTO: 0.02 K/UL — SIGNIFICANT CHANGE UP (ref 0–0.2)
BASOPHILS NFR BLD AUTO: 0.3 % — SIGNIFICANT CHANGE UP (ref 0–2)
EOSINOPHIL # BLD AUTO: 0.03 K/UL — SIGNIFICANT CHANGE UP (ref 0–0.5)
EOSINOPHIL NFR BLD AUTO: 0.5 % — SIGNIFICANT CHANGE UP (ref 0–6)
HCT VFR BLD CALC: 35.8 % — SIGNIFICANT CHANGE UP (ref 34.5–45)
HGB BLD-MCNC: 12 G/DL — SIGNIFICANT CHANGE UP (ref 11.5–15.5)
IMM GRANULOCYTES NFR BLD AUTO: 0.2 % — SIGNIFICANT CHANGE UP (ref 0–0.9)
LYMPHOCYTES # BLD AUTO: 1.62 K/UL — SIGNIFICANT CHANGE UP (ref 1–3.3)
LYMPHOCYTES # BLD AUTO: 24.9 % — SIGNIFICANT CHANGE UP (ref 13–44)
MCHC RBC-ENTMCNC: 30.5 PG — SIGNIFICANT CHANGE UP (ref 27–34)
MCHC RBC-ENTMCNC: 33.5 G/DL — SIGNIFICANT CHANGE UP (ref 32–36)
MCV RBC AUTO: 90.9 FL — SIGNIFICANT CHANGE UP (ref 80–100)
MONOCYTES # BLD AUTO: 0.53 K/UL — SIGNIFICANT CHANGE UP (ref 0–0.9)
MONOCYTES NFR BLD AUTO: 8.1 % — SIGNIFICANT CHANGE UP (ref 2–14)
NEUTROPHILS # BLD AUTO: 4.3 K/UL — SIGNIFICANT CHANGE UP (ref 1.8–7.4)
NEUTROPHILS NFR BLD AUTO: 66 % — SIGNIFICANT CHANGE UP (ref 43–77)
NRBC # BLD: 0 /100 WBCS — SIGNIFICANT CHANGE UP (ref 0–0)
PLATELET # BLD AUTO: 219 K/UL — SIGNIFICANT CHANGE UP (ref 150–400)
RBC # BLD: 3.94 M/UL — SIGNIFICANT CHANGE UP (ref 3.8–5.2)
RBC # FLD: 12.9 % — SIGNIFICANT CHANGE UP (ref 10.3–14.5)
WBC # BLD: 6.51 K/UL — SIGNIFICANT CHANGE UP (ref 3.8–10.5)
WBC # FLD AUTO: 6.51 K/UL — SIGNIFICANT CHANGE UP (ref 3.8–10.5)

## 2024-06-20 PROCEDURE — 99214 OFFICE O/P EST MOD 30 MIN: CPT

## 2024-06-20 PROCEDURE — G2211 COMPLEX E/M VISIT ADD ON: CPT | Mod: NC

## 2024-06-20 NOTE — PHYSICAL EXAM
[Fully active, able to carry on all pre-disease performance without restriction] : Status 0 - Fully active, able to carry on all pre-disease performance without restriction [Normal] : grossly intact [de-identified] : Right breast with healed megan-areolar surgical incision with volume loss; right SLN biopsy site well healed; ; Left breast well healed megan-areolar incision;  fibroglandular tissue; left axillary LN dissection site well healed;  left breast radiation related skin changes (erythema, and scaling) [de-identified] : anxious but appropriate affect

## 2024-06-20 NOTE — ASSESSMENT
[FreeTextEntry1] : 42 yo woman with initial diagnosis of DCIS and LCIS of the right breast in 1/2023, underwent Lumpectomy with upstaging of disease to invasive ductal carcinoma 3.5mm, ER+70PR+70Her2 negative. Initially wished to undergo bilateral mastectomy with reconstruction but after many discussions, opted to undergo breast conservation with SLN dissection and adjuvant radiation; additional LN sampling on 4/26/23 revealed 3 LNs which were negative for carcinoma. Completed RT in June 2024; started on tamoxifen 20mg daily in 7/2023. Repeat breast imaging including MRI in 1/2024 noted new LEFT breast lesion; initial biopsy ALH/ADH. In March 2024 Underwent excision and again upstaged to IDC 1.5mm (ER+90PR40+HER2 neg); Returned to OR for LEFT SLN in April 2024 with 2 negative LNs.;  Completed  planned to proceed with Radiation therapy to the Left breast on 5/7/24  - Prior Germline Mutation testing with [facility]; Breast STAT panel and ANDREA and CHEK2 genes were negative - discussed with patient and spouse that she had recurrent invasive breast cancer in the contra-lateral breast within 1 year of her prior diagnosis despite appropriate risk reduction therapy with tamoxifen - advised her invasive component is too small to evaluate benefit of chemotherapy with Oncotype/Mammoprint/PAM50;  - could resume tamoxifen or alternatively start ovarian suppression with AI - different mechanisms of action of all agents reviewed in great detail with patient and spouse -  would consider GnRH agonist therapy with Lupron vs. oophorectomy for purposes of ovarian suppression - once clinically and by lab criteria she is determined to be post-menopausal, will initiate aromatase inhibitor such as exemestane - risks/benefits/side effects including but not limited to increased symptoms of hot flushes/night sweats associated with menopause, increased risk of osteoporosis, worsening arthralgia/myalgia, rise in LFTs that will need to be monitored every 6 months and worsening lipid profile that will need to be monitored by PMD - case presented at Multidisciplinary Tumor board on 5/6/24 - Patient had the opportunity to have all their questions answered to their satisfaction - f/u with  Dr. Santos WILKINS as scheduled - f/u with Dr. Sylvia Reyes (breast surgeon) as scheduled  6/20/2024 - Start Lupron 7.5 mg IM today and then Q weeks - Baseline bw today including FSH, LH and estradiol - f/up 2 months for office visit (3rd injection) to check estradiol levels and discuss starting exemestane as noted above. - Patient had an opportunity to have her questions asked and answered to her satisfaction.

## 2024-06-20 NOTE — HISTORY OF PRESENT ILLNESS
[Disease: _____________________] : Disease: [unfilled] [T: ___] : T[unfilled] [N: ___] : N[unfilled] [M: ___] : M[unfilled] [Date: ____________] : Patient's last distress assessment performed on [unfilled]. [4 - Distress Level] : Distress Level: 4 [100: Normal, no complaints, no evidence of disease.] : 100: Normal, no complaints, no evidence of disease. [ECOG Performance Status: 0 - Fully active, able to carry on all pre-disease performance without restriction] : Performance Status: 0 - Fully active, able to carry on all pre-disease performance without restriction [de-identified] : 43 year old premenopausal woman with no personal of family history of breast or ovarian cancer who at the time of screening mammogram and US on 23 was noted to have right breast calcifications BIRADs 0.  Repeat diagnostic mammo/US rated BIRADs 4B for 3 suspicious groupings of calcification in the lower outer right breast.   On 23 Stereotactic biopsy of one of these groupings in the Right lower breast on 23 demonstrated Right breast ductal carcinoma in situ (DCIS) and Lobar carcinoma in situ (LCIS), ER: positive (95%), VA: Positive (90%).   She was seen by Breast Surgeon - Dr. Sylvia Reyes Pre-operative MRI 23 - Biopsy proven malignancy in right breast with no other suspicious enhancement and no evidence of multifocal or multicentric disease; no Lymph node involvement.  RIGHT Lumpectomy on 23 with pathologic upstaging; invasive ductal carcinoma 3.5mm noted to be 1mm from incked margin; DCIS intermediate grade 1.8cm, LCIS noted at <1mm from inked margin; all margins clear of invasive disease and DCIS. ER 70%, PR70%, Her2 neg (0 by IHC)  After several discussions with plastic surgery, she opted to proceed with Right SLN Bx on 4/3/23 which unfortunately yielded no lymphatic tissue despite 2 lymph nodes being submitted.  She underwent additional RIGHT axillary dissection on 23 which revealed 3 LNs with no evidence of malignancy and ultimately Radiation Oncology evaluation as well as part of her Breast Conservation Therapy. Followed by adjuvant radiation therapy to the RIGHT breast with Dr. Kuhn 4240 cG and 1000cGy boost  --> 23.  While on Tamoxifen... she developed a new contralateral breast cancer as noted below:  MRI 2024- LEFT breast 1.6cm indeterminant focal nonmass enhancement upper central portion. Multiple foci in a clustered distribution seen extending posteriorly and inferiorly from the aforementioned span finding in the left breast spanning a distance of 2 cm.  New postlumpectomy and posttreatment changes right breast, with mild diffuse vague asymmetric enhancement of the right breast likely secondary to postsurgical and/post treatment change.  MRI guided biopsy LEFT breast on 24 - ADH and ALH After seeking second opinion at [facility], underwent RIGHT lumpectomy with Dr. Reyes at [facility] on 3/4/24 with 1.5-2mm left breast Infiltrating ductal carcinoma Grade 1, ALH and 1.5 mm from inferior margin; ER+%, VA+40%, HER2 neg (IHC 0) Returned to the OR for SLN sampling of the left axilla; 2 negative lymph nodes  Completed radiation therapy with Dr. Kuhn to left breast 2024  Risk - , 1st full term pregnancy at age 33, no OCPs; no fertility therapy; maternal aunt with history of breast cancer; mother with lung cancer; father esophageal/throat cancer in his 50s.   [de-identified] : 3.5mm invasive; DCIS 1.8cm, LCIS [de-identified] : ER+ ND+ Her2 Negative [de-identified] : 6/20/2024 Patient returns today to rule out progression of breast cancer and start treatment: Treatment Plan: Lupron 7.5 mg IM Q 4 weeks  to add AI with 3rd injection Today is day 4 of menstrual cycle.  Patient denies any SOB, CP, abdominal pain, bone pain, headache, or unexplained weight loss Patient denies any breast masses,  skin changes or nipple discharge. Patient denies any hotflashes, arthralgias, vaginal dryness, vaginal bleeding, hair loss, muscle cramps.   Last Mammo/sono - 1/2024 JENNIFER MRI - 1/2024 Last BMD - N/A GI - last colonoscopy N/A GYN - Dr Brandy Marquez  - 10/2023 PCP -  Dr Qi Graham - had annual physical appointment in 11/2023  [FreeTextEntry7] : following with therapist

## 2024-06-21 DIAGNOSIS — C50.919 MALIGNANT NEOPLASM OF UNSPECIFIED SITE OF UNSPECIFIED FEMALE BREAST: ICD-10-CM

## 2024-06-21 LAB
ALBUMIN SERPL ELPH-MCNC: 4.2 G/DL
ALP BLD-CCNC: 39 U/L
ALT SERPL-CCNC: 17 U/L
ANION GAP SERPL CALC-SCNC: 12 MMOL/L
AST SERPL-CCNC: 17 U/L
BILIRUB SERPL-MCNC: <0.2 MG/DL
BUN SERPL-MCNC: 9 MG/DL
CALCIUM SERPL-MCNC: 9.2 MG/DL
CHLORIDE SERPL-SCNC: 105 MMOL/L
CO2 SERPL-SCNC: 24 MMOL/L
CREAT SERPL-MCNC: 0.68 MG/DL
EGFR: 112 ML/MIN/1.73M2
FSH SERPL-MCNC: 9.6 IU/L
GLUCOSE SERPL-MCNC: 101 MG/DL
LH SERPL-ACNC: 6.4 IU/L
POTASSIUM SERPL-SCNC: 4.2 MMOL/L
PROT SERPL-MCNC: 7.1 G/DL
SODIUM SERPL-SCNC: 141 MMOL/L

## 2024-06-28 LAB — ESTRADIOL ULTRASENSITIVE: 49.2 PG/ML

## 2024-07-01 ENCOUNTER — APPOINTMENT (OUTPATIENT)
Dept: MRI IMAGING | Facility: CLINIC | Age: 44
End: 2024-07-01
Payer: COMMERCIAL

## 2024-07-01 PROCEDURE — 77049 MRI BREAST C-+ W/CAD BI: CPT

## 2024-07-01 PROCEDURE — A9585: CPT

## 2024-07-11 ENCOUNTER — OUTPATIENT (OUTPATIENT)
Dept: OUTPATIENT SERVICES | Facility: HOSPITAL | Age: 44
LOS: 1 days | Discharge: HOME | End: 2024-07-11

## 2024-07-11 ENCOUNTER — APPOINTMENT (OUTPATIENT)
Dept: RADIATION ONCOLOGY | Facility: CLINIC | Age: 44
End: 2024-07-11
Payer: COMMERCIAL

## 2024-07-11 VITALS
BODY MASS INDEX: 26.66 KG/M2 | RESPIRATION RATE: 17 BRPM | DIASTOLIC BLOOD PRESSURE: 63 MMHG | OXYGEN SATURATION: 99 % | SYSTOLIC BLOOD PRESSURE: 101 MMHG | WEIGHT: 160 LBS | TEMPERATURE: 96.6 F | HEIGHT: 65 IN | HEART RATE: 74 BPM

## 2024-07-11 DIAGNOSIS — Z98.891 HISTORY OF UTERINE SCAR FROM PREVIOUS SURGERY: Chronic | ICD-10-CM

## 2024-07-11 DIAGNOSIS — C50.912 MALIGNANT NEOPLASM OF UNSPECIFIED SITE OF LEFT FEMALE BREAST: ICD-10-CM

## 2024-07-11 DIAGNOSIS — E34.9 ENDOCRINE DISORDER, UNSPECIFIED: ICD-10-CM

## 2024-07-11 DIAGNOSIS — Z98.890 OTHER SPECIFIED POSTPROCEDURAL STATES: Chronic | ICD-10-CM

## 2024-07-11 PROCEDURE — 99024 POSTOP FOLLOW-UP VISIT: CPT

## 2024-07-11 RX ORDER — LEUPROLIDE ACETATE 3.75 MG
3.75 KIT INTRAMUSCULAR
Refills: 0 | Status: ACTIVE | COMMUNITY
Start: 2024-07-11

## 2024-07-18 ENCOUNTER — APPOINTMENT (OUTPATIENT)
Dept: SURGICAL ONCOLOGY | Facility: CLINIC | Age: 44
End: 2024-07-18

## 2024-07-18 ENCOUNTER — APPOINTMENT (OUTPATIENT)
Dept: INFUSION THERAPY | Facility: HOSPITAL | Age: 44
End: 2024-07-18

## 2024-07-18 VITALS
HEIGHT: 65 IN | BODY MASS INDEX: 26.66 KG/M2 | WEIGHT: 160 LBS | HEART RATE: 84 BPM | SYSTOLIC BLOOD PRESSURE: 103 MMHG | DIASTOLIC BLOOD PRESSURE: 73 MMHG | OXYGEN SATURATION: 98 % | RESPIRATION RATE: 17 BRPM

## 2024-07-18 VITALS
OXYGEN SATURATION: 98 % | HEIGHT: 65 IN | HEART RATE: 84 BPM | DIASTOLIC BLOOD PRESSURE: 73 MMHG | WEIGHT: 160 LBS | RESPIRATION RATE: 17 BRPM | BODY MASS INDEX: 26.66 KG/M2 | SYSTOLIC BLOOD PRESSURE: 103 MMHG

## 2024-07-18 DIAGNOSIS — Z85.3 PERSONAL HISTORY OF MALIGNANT NEOPLASM OF BREAST: ICD-10-CM

## 2024-07-18 DIAGNOSIS — N60.99 UNSPECIFIED BENIGN MAMMARY DYSPLASIA OF UNSPECIFIED BREAST: ICD-10-CM

## 2024-07-18 PROCEDURE — 99213 OFFICE O/P EST LOW 20 MIN: CPT

## 2024-07-19 DIAGNOSIS — Z51.11 ENCOUNTER FOR ANTINEOPLASTIC CHEMOTHERAPY: ICD-10-CM

## 2024-07-19 DIAGNOSIS — C50.919 MALIGNANT NEOPLASM OF UNSPECIFIED SITE OF UNSPECIFIED FEMALE BREAST: ICD-10-CM

## 2024-08-16 ENCOUNTER — APPOINTMENT (OUTPATIENT)
Dept: HEMATOLOGY ONCOLOGY | Facility: CLINIC | Age: 44
End: 2024-08-16
Payer: COMMERCIAL

## 2024-08-16 ENCOUNTER — APPOINTMENT (OUTPATIENT)
Dept: INFUSION THERAPY | Facility: HOSPITAL | Age: 44
End: 2024-08-16

## 2024-08-16 VITALS
BODY MASS INDEX: 26.78 KG/M2 | WEIGHT: 160.92 LBS | RESPIRATION RATE: 16 BRPM | SYSTOLIC BLOOD PRESSURE: 112 MMHG | DIASTOLIC BLOOD PRESSURE: 81 MMHG | OXYGEN SATURATION: 98 % | TEMPERATURE: 97 F | HEART RATE: 77 BPM

## 2024-08-16 DIAGNOSIS — D05.11 INTRADUCTAL CARCINOMA IN SITU OF RIGHT BREAST: ICD-10-CM

## 2024-08-16 DIAGNOSIS — D05.01 LOBULAR CARCINOMA IN SITU OF RIGHT BREAST: ICD-10-CM

## 2024-08-16 DIAGNOSIS — C50.912 MALIGNANT NEOPLASM OF UNSPECIFIED SITE OF LEFT FEMALE BREAST: ICD-10-CM

## 2024-08-16 DIAGNOSIS — C50.911 MALIGNANT NEOPLASM OF UNSPECIFIED SITE OF RIGHT FEMALE BREAST: ICD-10-CM

## 2024-08-16 PROCEDURE — 99214 OFFICE O/P EST MOD 30 MIN: CPT

## 2024-08-16 PROCEDURE — G2211 COMPLEX E/M VISIT ADD ON: CPT | Mod: NC

## 2024-08-18 NOTE — PHYSICAL EXAM
[Fully active, able to carry on all pre-disease performance without restriction] : Status 0 - Fully active, able to carry on all pre-disease performance without restriction [de-identified] : anxious but appropriate affect [Normal] : affect appropriate [de-identified] : Right breast with healed megan-areolar surgical incision with volume loss; right SLN biopsy site well healed;  Left breast well healed megan-areolar incision;  fibroglandular tissue; left axillary LN dissection site well healed;  left breast radiation related skin changes (erythema, and scaling)

## 2024-08-18 NOTE — ASSESSMENT
[FreeTextEntry1] : 42 yo woman with initial diagnosis of DCIS and LCIS of the right breast in 1/2023, underwent Lumpectomy with upstaging of disease to invasive ductal carcinoma 3.5mm, ER+70PR+70Her2 negative. Initially wished to undergo bilateral mastectomy with reconstruction but after many discussions, opted to undergo breast conservation with SLN dissection and adjuvant radiation; additional LN sampling on 4/26/23 revealed 3 LNs which were negative for carcinoma. Completed RT in June 2024; started on tamoxifen 20mg daily in 7/2023. Repeat breast imaging including MRI in 1/2024 noted new LEFT breast lesion; initial biopsy ALH/ADH. In March 2024 Underwent excision and again upstaged to IDC 1.5mm (ER+90PR40+HER2 neg); Returned to OR for LEFT SLN in April 2024 with 2 negative LNs.;  Completed  planned to proceed with Radiation therapy to the Left breast on 5/7/24  - Prior Germline Mutation testing with [facility]; Breast STAT panel and ANDREA and CHEK2 genes were negative - discussed with patient and spouse that she had recurrent invasive breast cancer in the contra-lateral breast within 1 year of her prior diagnosis despite appropriate risk reduction therapy with tamoxifen - advised her invasive component is too small to evaluate benefit of chemotherapy with Oncotype/Mammoprint/PAM50;  Initiated in June 2024 on GnRH agonist therapy with Lupron (option of oophorectomy for purposes of ovarian suppression discussed as well) - once clinically and by lab criteria she is determined to be post-menopausal, will initiate aromatase inhibitor such as exemestane; she received Lupron injection today; will plan to have labs done at [facility] (Chem panel and Estradiol Ultrasensitive) - risks/benefits/side effects including but not limited to increased symptoms of hot flushes/night sweats associated with menopause, increased risk of osteoporosis, worsening arthralgia/myalgia, rise in LFTs that will need to be monitored every 6 months and worsening lipid profile that will need to be monitored by PMD - will also need baseline bone density once exemestane is started - Patient had the opportunity to have all their questions answered to their satisfaction - f/u with Dr. Graham PMD as scheduled - f/u with Dr. Sylvia Reyes (breast surgeon) as scheduled; had negative MRI breast; in 7/2024; plan for mammo/sono in 1/2025  - Patient had an opportunity to have her questions asked and answered to her satisfaction. - advised that once her labs result, we will start Exemestane - f/u in 3 months for office appointment; continue monthly injections of Lupron

## 2024-08-18 NOTE — PHYSICAL EXAM
[Fully active, able to carry on all pre-disease performance without restriction] : Status 0 - Fully active, able to carry on all pre-disease performance without restriction [de-identified] : anxious but appropriate affect [Normal] : affect appropriate [de-identified] : Right breast with healed megan-areolar surgical incision with volume loss; right SLN biopsy site well healed;  Left breast well healed megan-areolar incision;  fibroglandular tissue; left axillary LN dissection site well healed;  left breast radiation related skin changes (erythema, and scaling)

## 2024-08-18 NOTE — ASSESSMENT
[FreeTextEntry1] : 42 yo woman with initial diagnosis of DCIS and LCIS of the right breast in 1/2023, underwent Lumpectomy with upstaging of disease to invasive ductal carcinoma 3.5mm, ER+70PR+70Her2 negative. Initially wished to undergo bilateral mastectomy with reconstruction but after many discussions, opted to undergo breast conservation with SLN dissection and adjuvant radiation; additional LN sampling on 4/26/23 revealed 3 LNs which were negative for carcinoma. Completed RT in June 2024; started on tamoxifen 20mg daily in 7/2023. Repeat breast imaging including MRI in 1/2024 noted new LEFT breast lesion; initial biopsy ALH/ADH. In March 2024 Underwent excision and again upstaged to IDC 1.5mm (ER+90PR40+HER2 neg); Returned to OR for LEFT SLN in April 2024 with 2 negative LNs.;  Completed  planned to proceed with Radiation therapy to the Left breast on 5/7/24  - Prior Germline Mutation testing with [facility]; Breast STAT panel and ANDREA and CHEK2 genes were negative - discussed with patient and spouse that she had recurrent invasive breast cancer in the contra-lateral breast within 1 year of her prior diagnosis despite appropriate risk reduction therapy with tamoxifen - advised her invasive component is too small to evaluate benefit of chemotherapy with Oncotype/Mammoprint/PAM50;  Initiated in June 2024 on GnRH agonist therapy with Lupron (option of oophorectomy for purposes of ovarian suppression discussed as well) - once clinically and by lab criteria she is determined to be post-menopausal, will initiate aromatase inhibitor such as exemestane; she received Lupron injection today; will plan to have labs done at [facility] (Chem panel and Estradiol Ultrasensitive) - risks/benefits/side effects including but not limited to increased symptoms of hot flushes/night sweats associated with menopause, increased risk of osteoporosis, worsening arthralgia/myalgia, rise in LFTs that will need to be monitored every 6 months and worsening lipid profile that will need to be monitored by PMD - will also need baseline bone density once exemestane is started - Patient had the opportunity to have all their questions answered to their satisfaction - f/u with Dr. Graham PMD as scheduled - f/u with Dr. Sylvia Reyes (breast surgeon) as scheduled; had negative MRI breast; in 7/2024; plan for mammo/sono in 1/2025  - Patient had an opportunity to have her questions asked and answered to her satisfaction. - advised that once her labs result, we will start Exemestane - f/u in 3 months for office appointment; continue monthly injections of Lupron

## 2024-08-18 NOTE — HISTORY OF PRESENT ILLNESS
[Disease: _____________________] : Disease: [unfilled] [T: ___] : T[unfilled] [N: ___] : N[unfilled] [M: ___] : M[unfilled] [Date: ____________] : Patient's last distress assessment performed on [unfilled]. [4 - Distress Level] : Distress Level: 4 [100: Normal, no complaints, no evidence of disease.] : 100: Normal, no complaints, no evidence of disease. [ECOG Performance Status: 0 - Fully active, able to carry on all pre-disease performance without restriction] : Performance Status: 0 - Fully active, able to carry on all pre-disease performance without restriction [de-identified] : 43 year old premenopausal woman with no personal of family history of breast or ovarian cancer who at the time of screening mammogram and US on 23 was noted to have right breast calcifications BIRADs 0.  Repeat diagnostic mammo/US rated BIRADs 4B for 3 suspicious groupings of calcification in the lower outer right breast.   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   She was seen by Breast Surgeon - Dr. Sylvia Reyes Pre-operative MRI 23 - Biopsy proven malignancy in right breast with no other suspicious enhancement and no evidence of multifocal or multicentric disease; no Lymph node involvement.  RIGHT Lumpectomy on 23 with pathologic upstaging; invasive ductal carcinoma 3.5mm noted to be 1mm from incked margin; DCIS intermediate grade 1.8cm, LCIS noted at <1mm from inked margin; all margins clear of invasive disease and DCIS. ER 70%, PR70%, Her2 neg (0 by IHC)  After several discussions with plastic surgery, she opted to proceed with Right SLN Bx on 4/3/23 which unfortunately yielded no lymphatic tissue despite 2 lymph nodes being submitted.  She underwent additional RIGHT axillary dissection on 23 which revealed 3 LNs with no evidence of malignancy and ultimately Radiation Oncology evaluation as well as part of her Breast Conservation Therapy. Followed by adjuvant radiation therapy to the RIGHT breast with Dr. Kuhn 4240 cG and 1000cGy boost  --> 23.  While on Tamoxifen... she developed a new contralateral breast cancer as noted below:  MRI 2024- LEFT breast 1.6cm indeterminant focal nonmass enhancement upper central portion. Multiple foci in a clustered distribution seen extending posteriorly and inferiorly from the aforementioned span finding in the left breast spanning a distance of 2 cm.  New postlumpectomy and posttreatment changes right breast, with mild diffuse vague asymmetric enhancement of the right breast likely secondary to postsurgical and/post treatment change.  MRI guided biopsy LEFT breast on 24 - ADH and ALH After seeking second opinion at [facility] [place], underwent RIGHT lumpectomy with Dr. Reyes at [facility] on 3/4/24 with 1.5-2mm left breast Infiltrating ductal carcinoma Grade 1, ALH and 1.5 mm from inferior margin; ER+%, CO+40%, HER2 neg (IHC 0) Returned to the OR for SLN sampling of the left axilla; 2 negative lymph nodes  Completed radiation therapy with Dr. Kuhn to left breast 2024  Risk - , 1st full term pregnancy at age 33, no OCPs; no fertility therapy; maternal aunt with history of breast cancer; mother with lung cancer; father esophageal/throat cancer in his 50s.   [de-identified] : 3.5mm invasive; DCIS 1.8cm, LCIS [de-identified] : ER+ NC+ Her2 Negative [de-identified] : 8/16/24 Patient returns today to rule out progression of breast cancer and start treatment: Treatment Plan: Lupron 7.5 mg IM Q 4 weeks to add AI after 3rd injection as long as she is deemed post-menopausal;  received Lupron dose already today (#3)  Reports no menstrual periods; very minimal hot flushes with Lupron injections; Patient denies any SOB, CP, abdominal pain, bone pain, headache, or unexplained weight loss Patient denies any breast masses,  skin changes or nipple discharge. Patient denies arthralgias, vaginal dryness, vaginal bleeding, hair loss, muscle cramps.   Last Mammo/sono - 1/2024 JENN[facility]ER MRI - 7/1/24 - BIRADS 2 - JENNIFER Last BMD  - not done yet; will get baseline GI - last colonoscopy N/A GYN - Dr Brandy Marquez  - 10/2023 PCP -  Dr Qi Graham - had annual physical appointment in 11/2023  [FreeTextEntry7] : following with therapist

## 2024-08-18 NOTE — HISTORY OF PRESENT ILLNESS
Consult ko Hawkins  Date:1/2/2023,  Time:1:27 PM        Electronically signed by James Hein on 1/2/2023 at 1:27 PM [Disease: _____________________] : Disease: [unfilled] [T: ___] : T[unfilled] [N: ___] : N[unfilled] [M: ___] : M[unfilled] [Date: ____________] : Patient's last distress assessment performed on [unfilled]. [4 - Distress Level] : Distress Level: 4 [100: Normal, no complaints, no evidence of disease.] : 100: Normal, no complaints, no evidence of disease. [ECOG Performance Status: 0 - Fully active, able to carry on all pre-disease performance without restriction] : Performance Status: 0 - Fully active, able to carry on all pre-disease performance without restriction [de-identified] : 43 year old premenopausal woman with no personal of family history of breast or ovarian cancer who at the time of screening mammogram and US on 23 was noted to have right breast calcifications BIRADs 0.  Repeat diagnostic mammo/US rated BIRADs 4B for 3 suspicious groupings of calcification in the lower outer right breast.   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   She was seen by Breast Surgeon - Dr. Sylvia Reyes Pre-operative MRI 23 - Biopsy proven malignancy in right breast with no other suspicious enhancement and no evidence of multifocal or multicentric disease; no Lymph node involvement.  RIGHT Lumpectomy on 23 with pathologic upstaging; invasive ductal carcinoma 3.5mm noted to be 1mm from incked margin; DCIS intermediate grade 1.8cm, LCIS noted at <1mm from inked margin; all margins clear of invasive disease and DCIS. ER 70%, PR70%, Her2 neg (0 by IHC)  After several discussions with plastic surgery, she opted to proceed with Right SLN Bx on 4/3/23 which unfortunately yielded no lymphatic tissue despite 2 lymph nodes being submitted.  She underwent additional RIGHT axillary dissection on 23 which revealed 3 LNs with no evidence of malignancy and ultimately Radiation Oncology evaluation as well as part of her Breast Conservation Therapy. Followed by adjuvant radiation therapy to the RIGHT breast with Dr. Kuhn 4240 cG and 1000cGy boost  --> 23.  While on Tamoxifen... she developed a new contralateral breast cancer as noted below:  MRI 2024- LEFT breast 1.6cm indeterminant focal nonmass enhancement upper central portion. Multiple foci in a clustered distribution seen extending posteriorly and inferiorly from the aforementioned span finding in the left breast spanning a distance of 2 cm.  New postlumpectomy and posttreatment changes right breast, with mild diffuse vague asymmetric enhancement of the right breast likely secondary to postsurgical and/post treatment change.  MRI guided biopsy LEFT breast on 24 - ADH and ALH After seeking second opinion at [facility] [place], underwent RIGHT lumpectomy with Dr. Reyes at [facility] on 3/4/24 with 1.5-2mm left breast Infiltrating ductal carcinoma Grade 1, ALH and 1.5 mm from inferior margin; ER+%, NJ+40%, HER2 neg (IHC 0) Returned to the OR for SLN sampling of the left axilla; 2 negative lymph nodes  Completed radiation therapy with Dr. Kuhn to left breast 2024  Risk - , 1st full term pregnancy at age 33, no OCPs; no fertility therapy; maternal aunt with history of breast cancer; mother with lung cancer; father esophageal/throat cancer in his 50s.   [de-identified] : 3.5mm invasive; DCIS 1.8cm, LCIS [de-identified] : ER+ IA+ Her2 Negative [de-identified] : 8/16/24 Patient returns today to rule out progression of breast cancer and start treatment: Treatment Plan: Lupron 7.5 mg IM Q 4 weeks to add AI after 3rd injection as long as she is deemed post-menopausal;  received Lupron dose already today (#3)  Reports no menstrual periods; very minimal hot flushes with Lupron injections; Patient denies any SOB, CP, abdominal pain, bone pain, headache, or unexplained weight loss Patient denies any breast masses,  skin changes or nipple discharge. Patient denies arthralgias, vaginal dryness, vaginal bleeding, hair loss, muscle cramps.   Last Mammo/sono - 1/2024 [facility]ER MRI - 7/1/24 - BIRADS 2 - JENNIFER Last BMD  - not done yet; will get baseline GI - last colonoscopy N/A GYN - Dr Brandy Marquez  - 10/2023 PCP -  Dr Qi Graham - had annual physical appointment in 11/2023  [FreeTextEntry7] : following with therapist

## 2024-08-26 ENCOUNTER — APPOINTMENT (OUTPATIENT)
Dept: HEMATOLOGY ONCOLOGY | Facility: CLINIC | Age: 44
End: 2024-08-26

## 2024-09-08 ENCOUNTER — OUTPATIENT (OUTPATIENT)
Dept: OUTPATIENT SERVICES | Facility: HOSPITAL | Age: 44
LOS: 1 days | Discharge: HOME | End: 2024-09-08

## 2024-09-08 DIAGNOSIS — Z98.891 HISTORY OF UTERINE SCAR FROM PREVIOUS SURGERY: Chronic | ICD-10-CM

## 2024-09-08 DIAGNOSIS — Z98.890 OTHER SPECIFIED POSTPROCEDURAL STATES: Chronic | ICD-10-CM

## 2024-09-08 DIAGNOSIS — E34.9 ENDOCRINE DISORDER, UNSPECIFIED: ICD-10-CM

## 2024-09-13 ENCOUNTER — APPOINTMENT (OUTPATIENT)
Dept: INFUSION THERAPY | Facility: HOSPITAL | Age: 44
End: 2024-09-13

## 2024-09-16 DIAGNOSIS — C50.919 MALIGNANT NEOPLASM OF UNSPECIFIED SITE OF UNSPECIFIED FEMALE BREAST: ICD-10-CM

## 2024-09-17 RX ORDER — EXEMESTANE 25 MG/1
25 TABLET, FILM COATED ORAL DAILY
Qty: 30 | Refills: 2 | Status: ACTIVE | COMMUNITY
Start: 2024-09-17 | End: 1900-01-01

## 2024-10-11 ENCOUNTER — APPOINTMENT (OUTPATIENT)
Dept: INFUSION THERAPY | Facility: HOSPITAL | Age: 44
End: 2024-10-11

## 2024-10-14 DIAGNOSIS — Z51.11 ENCOUNTER FOR ANTINEOPLASTIC CHEMOTHERAPY: ICD-10-CM

## 2024-11-07 ENCOUNTER — OUTPATIENT (OUTPATIENT)
Dept: OUTPATIENT SERVICES | Facility: HOSPITAL | Age: 44
LOS: 1 days | Discharge: HOME | End: 2024-11-07

## 2024-11-07 DIAGNOSIS — Z98.891 HISTORY OF UTERINE SCAR FROM PREVIOUS SURGERY: Chronic | ICD-10-CM

## 2024-11-07 DIAGNOSIS — E34.9 ENDOCRINE DISORDER, UNSPECIFIED: ICD-10-CM

## 2024-11-07 DIAGNOSIS — Z98.890 OTHER SPECIFIED POSTPROCEDURAL STATES: Chronic | ICD-10-CM

## 2024-11-08 ENCOUNTER — APPOINTMENT (OUTPATIENT)
Dept: INFUSION THERAPY | Facility: HOSPITAL | Age: 44
End: 2024-11-08

## 2024-11-08 ENCOUNTER — APPOINTMENT (OUTPATIENT)
Dept: HEMATOLOGY ONCOLOGY | Facility: CLINIC | Age: 44
End: 2024-11-08
Payer: COMMERCIAL

## 2024-11-08 VITALS
TEMPERATURE: 98.1 F | BODY MASS INDEX: 27.51 KG/M2 | OXYGEN SATURATION: 99 % | SYSTOLIC BLOOD PRESSURE: 104 MMHG | DIASTOLIC BLOOD PRESSURE: 73 MMHG | RESPIRATION RATE: 16 BRPM | WEIGHT: 165.34 LBS | HEART RATE: 75 BPM

## 2024-11-08 DIAGNOSIS — C50.912 MALIGNANT NEOPLASM OF UNSPECIFIED SITE OF LEFT FEMALE BREAST: ICD-10-CM

## 2024-11-08 DIAGNOSIS — C50.911 MALIGNANT NEOPLASM OF UNSPECIFIED SITE OF RIGHT FEMALE BREAST: ICD-10-CM

## 2024-11-08 DIAGNOSIS — Z79.899 OTHER LONG TERM (CURRENT) DRUG THERAPY: ICD-10-CM

## 2024-11-08 PROCEDURE — 99214 OFFICE O/P EST MOD 30 MIN: CPT

## 2024-11-08 PROCEDURE — G2211 COMPLEX E/M VISIT ADD ON: CPT | Mod: NC

## 2024-12-13 ENCOUNTER — APPOINTMENT (OUTPATIENT)
Dept: INFUSION THERAPY | Facility: HOSPITAL | Age: 44
End: 2024-12-13

## 2024-12-17 ENCOUNTER — NON-APPOINTMENT (OUTPATIENT)
Age: 44
End: 2024-12-17

## 2025-01-07 ENCOUNTER — OUTPATIENT (OUTPATIENT)
Dept: OUTPATIENT SERVICES | Facility: HOSPITAL | Age: 45
LOS: 1 days | Discharge: HOME | End: 2025-01-07

## 2025-01-07 DIAGNOSIS — Z98.891 HISTORY OF UTERINE SCAR FROM PREVIOUS SURGERY: Chronic | ICD-10-CM

## 2025-01-07 DIAGNOSIS — Z98.890 OTHER SPECIFIED POSTPROCEDURAL STATES: Chronic | ICD-10-CM

## 2025-01-07 DIAGNOSIS — E34.9 ENDOCRINE DISORDER, UNSPECIFIED: ICD-10-CM

## 2025-01-10 ENCOUNTER — APPOINTMENT (OUTPATIENT)
Dept: INFUSION THERAPY | Facility: HOSPITAL | Age: 45
End: 2025-01-10

## 2025-01-10 ENCOUNTER — RESULT REVIEW (OUTPATIENT)
Age: 45
End: 2025-01-10

## 2025-01-10 ENCOUNTER — OUTPATIENT (OUTPATIENT)
Dept: OUTPATIENT SERVICES | Facility: HOSPITAL | Age: 45
LOS: 1 days | End: 2025-01-10

## 2025-01-10 ENCOUNTER — OUTPATIENT (OUTPATIENT)
Dept: OUTPATIENT SERVICES | Facility: HOSPITAL | Age: 45
LOS: 1 days | End: 2025-01-10
Payer: COMMERCIAL

## 2025-01-10 ENCOUNTER — APPOINTMENT (OUTPATIENT)
Dept: MAMMOGRAPHY | Facility: IMAGING CENTER | Age: 45
End: 2025-01-10
Payer: COMMERCIAL

## 2025-01-10 ENCOUNTER — APPOINTMENT (OUTPATIENT)
Dept: ULTRASOUND IMAGING | Facility: IMAGING CENTER | Age: 45
End: 2025-01-10

## 2025-01-10 DIAGNOSIS — Z98.890 OTHER SPECIFIED POSTPROCEDURAL STATES: Chronic | ICD-10-CM

## 2025-01-10 DIAGNOSIS — Z85.3 PERSONAL HISTORY OF MALIGNANT NEOPLASM OF BREAST: ICD-10-CM

## 2025-01-10 DIAGNOSIS — Z98.891 HISTORY OF UTERINE SCAR FROM PREVIOUS SURGERY: Chronic | ICD-10-CM

## 2025-01-10 DIAGNOSIS — Z00.8 ENCOUNTER FOR OTHER GENERAL EXAMINATION: ICD-10-CM

## 2025-01-10 PROCEDURE — 77066 DX MAMMO INCL CAD BI: CPT

## 2025-01-10 PROCEDURE — 76641 ULTRASOUND BREAST COMPLETE: CPT

## 2025-01-10 PROCEDURE — 76641 ULTRASOUND BREAST COMPLETE: CPT | Mod: 26,50

## 2025-01-10 PROCEDURE — G0279: CPT | Mod: 26

## 2025-01-10 PROCEDURE — 77066 DX MAMMO INCL CAD BI: CPT | Mod: 26

## 2025-01-10 PROCEDURE — G0279: CPT

## 2025-01-13 DIAGNOSIS — C50.919 MALIGNANT NEOPLASM OF UNSPECIFIED SITE OF UNSPECIFIED FEMALE BREAST: ICD-10-CM

## 2025-01-16 ENCOUNTER — APPOINTMENT (OUTPATIENT)
Dept: SURGICAL ONCOLOGY | Facility: CLINIC | Age: 45
End: 2025-01-16
Payer: COMMERCIAL

## 2025-01-16 VITALS
DIASTOLIC BLOOD PRESSURE: 70 MMHG | WEIGHT: 159 LBS | HEART RATE: 78 BPM | HEIGHT: 65 IN | SYSTOLIC BLOOD PRESSURE: 102 MMHG | OXYGEN SATURATION: 98 % | BODY MASS INDEX: 26.49 KG/M2

## 2025-01-16 DIAGNOSIS — Z85.3 PERSONAL HISTORY OF MALIGNANT NEOPLASM OF BREAST: ICD-10-CM

## 2025-01-16 PROCEDURE — 99212 OFFICE O/P EST SF 10 MIN: CPT

## 2025-01-30 NOTE — DISCHARGE NOTE ANTEPARTUM - YES
She has been waking up with pain and redness in her toes.  I will give her information on Raynaud's phenomenon although I am not certain that that is what the problem is.  She has good dorsalis pedis and posterior tibial pulses in both feet.  Toes are normal on exam.  Will monitor.  Suggested that she take a picture of her toes when they are red.   Statement Selected

## 2025-02-07 ENCOUNTER — APPOINTMENT (OUTPATIENT)
Dept: OBGYN | Facility: CLINIC | Age: 45
End: 2025-02-07
Payer: COMMERCIAL

## 2025-02-07 ENCOUNTER — APPOINTMENT (OUTPATIENT)
Dept: INFUSION THERAPY | Facility: HOSPITAL | Age: 45
End: 2025-02-07

## 2025-02-07 VITALS
HEIGHT: 65 IN | SYSTOLIC BLOOD PRESSURE: 104 MMHG | HEART RATE: 78 BPM | DIASTOLIC BLOOD PRESSURE: 70 MMHG | BODY MASS INDEX: 26.49 KG/M2 | WEIGHT: 159 LBS

## 2025-02-07 DIAGNOSIS — Z01.419 ENCOUNTER FOR GYNECOLOGICAL EXAMINATION (GENERAL) (ROUTINE) W/OUT ABNORMAL FINDINGS: ICD-10-CM

## 2025-02-07 PROCEDURE — 99396 PREV VISIT EST AGE 40-64: CPT

## 2025-02-10 ENCOUNTER — APPOINTMENT (OUTPATIENT)
Dept: INFUSION THERAPY | Facility: HOSPITAL | Age: 45
End: 2025-02-10

## 2025-02-10 LAB — HPV HIGH+LOW RISK DNA PNL CVX: NOT DETECTED

## 2025-02-12 ENCOUNTER — TRANSCRIPTION ENCOUNTER (OUTPATIENT)
Age: 45
End: 2025-02-12

## 2025-02-12 LAB — CYTOLOGY CVX/VAG DOC THIN PREP: ABNORMAL

## 2025-03-03 ENCOUNTER — APPOINTMENT (OUTPATIENT)
Dept: HEMATOLOGY ONCOLOGY | Facility: CLINIC | Age: 45
End: 2025-03-03

## 2025-03-05 ENCOUNTER — OUTPATIENT (OUTPATIENT)
Dept: OUTPATIENT SERVICES | Facility: HOSPITAL | Age: 45
LOS: 1 days | Discharge: HOME | End: 2025-03-05

## 2025-03-05 DIAGNOSIS — Z98.890 OTHER SPECIFIED POSTPROCEDURAL STATES: Chronic | ICD-10-CM

## 2025-03-05 DIAGNOSIS — Z98.891 HISTORY OF UTERINE SCAR FROM PREVIOUS SURGERY: Chronic | ICD-10-CM

## 2025-03-05 DIAGNOSIS — E34.9 ENDOCRINE DISORDER, UNSPECIFIED: ICD-10-CM

## 2025-03-10 ENCOUNTER — RESULT REVIEW (OUTPATIENT)
Age: 45
End: 2025-03-10

## 2025-03-10 ENCOUNTER — APPOINTMENT (OUTPATIENT)
Dept: INFUSION THERAPY | Facility: HOSPITAL | Age: 45
End: 2025-03-10

## 2025-03-10 ENCOUNTER — APPOINTMENT (OUTPATIENT)
Dept: HEMATOLOGY ONCOLOGY | Facility: CLINIC | Age: 45
End: 2025-03-10
Payer: COMMERCIAL

## 2025-03-10 VITALS
BODY MASS INDEX: 26.49 KG/M2 | DIASTOLIC BLOOD PRESSURE: 77 MMHG | SYSTOLIC BLOOD PRESSURE: 111 MMHG | RESPIRATION RATE: 16 BRPM | WEIGHT: 159.17 LBS | TEMPERATURE: 97 F | OXYGEN SATURATION: 97 % | HEART RATE: 75 BPM

## 2025-03-10 DIAGNOSIS — C50.912 MALIGNANT NEOPLASM OF UNSPECIFIED SITE OF LEFT FEMALE BREAST: ICD-10-CM

## 2025-03-10 DIAGNOSIS — C50.911 MALIGNANT NEOPLASM OF UNSPECIFIED SITE OF RIGHT FEMALE BREAST: ICD-10-CM

## 2025-03-10 LAB
ALBUMIN SERPL ELPH-MCNC: 4.6 G/DL
ALP BLD-CCNC: 66 U/L
ALT SERPL-CCNC: 18 U/L
ANION GAP SERPL CALC-SCNC: 10 MMOL/L
AST SERPL-CCNC: 34 U/L
BASOPHILS # BLD AUTO: 0.03 K/UL — SIGNIFICANT CHANGE UP (ref 0–0.2)
BASOPHILS NFR BLD AUTO: 0.4 % — SIGNIFICANT CHANGE UP (ref 0–2)
BILIRUB SERPL-MCNC: 0.3 MG/DL
BUN SERPL-MCNC: 10 MG/DL
CALCIUM SERPL-MCNC: 10 MG/DL
CHLORIDE SERPL-SCNC: 105 MMOL/L
CO2 SERPL-SCNC: 29 MMOL/L
CREAT SERPL-MCNC: 0.67 MG/DL
EGFRCR SERPLBLD CKD-EPI 2021: 112 ML/MIN/1.73M2
EOSINOPHIL # BLD AUTO: 0.05 K/UL — SIGNIFICANT CHANGE UP (ref 0–0.5)
EOSINOPHIL NFR BLD AUTO: 0.7 % — SIGNIFICANT CHANGE UP (ref 0–6)
GLUCOSE SERPL-MCNC: 73 MG/DL
HCT VFR BLD CALC: 42.1 % — SIGNIFICANT CHANGE UP (ref 34.5–45)
HGB BLD-MCNC: 14.2 G/DL — SIGNIFICANT CHANGE UP (ref 11.5–15.5)
IMM GRANULOCYTES NFR BLD AUTO: 0.3 % — SIGNIFICANT CHANGE UP (ref 0–0.9)
LYMPHOCYTES # BLD AUTO: 1.97 K/UL — SIGNIFICANT CHANGE UP (ref 1–3.3)
LYMPHOCYTES # BLD AUTO: 27 % — SIGNIFICANT CHANGE UP (ref 13–44)
MCHC RBC-ENTMCNC: 30.1 PG — SIGNIFICANT CHANGE UP (ref 27–34)
MCHC RBC-ENTMCNC: 33.7 G/DL — SIGNIFICANT CHANGE UP (ref 32–36)
MCV RBC AUTO: 89.2 FL — SIGNIFICANT CHANGE UP (ref 80–100)
MONOCYTES # BLD AUTO: 0.49 K/UL — SIGNIFICANT CHANGE UP (ref 0–0.9)
MONOCYTES NFR BLD AUTO: 6.7 % — SIGNIFICANT CHANGE UP (ref 2–14)
NEUTROPHILS # BLD AUTO: 4.73 K/UL — SIGNIFICANT CHANGE UP (ref 1.8–7.4)
NEUTROPHILS NFR BLD AUTO: 64.9 % — SIGNIFICANT CHANGE UP (ref 43–77)
NRBC BLD AUTO-RTO: 0 /100 WBCS — SIGNIFICANT CHANGE UP (ref 0–0)
PLATELET # BLD AUTO: 229 K/UL — SIGNIFICANT CHANGE UP (ref 150–400)
POTASSIUM SERPL-SCNC: 4.5 MMOL/L
PROT SERPL-MCNC: 7.6 G/DL
RBC # BLD: 4.72 M/UL — SIGNIFICANT CHANGE UP (ref 3.8–5.2)
RBC # FLD: 12.8 % — SIGNIFICANT CHANGE UP (ref 10.3–14.5)
SODIUM SERPL-SCNC: 143 MMOL/L
WBC # BLD: 7.29 K/UL — SIGNIFICANT CHANGE UP (ref 3.8–10.5)
WBC # FLD AUTO: 7.29 K/UL — SIGNIFICANT CHANGE UP (ref 3.8–10.5)

## 2025-03-10 PROCEDURE — G2211 COMPLEX E/M VISIT ADD ON: CPT | Mod: NC

## 2025-03-10 PROCEDURE — 99214 OFFICE O/P EST MOD 30 MIN: CPT

## 2025-03-10 NOTE — ASU PATIENT PROFILE, ADULT - NS PRO LAST MENSTRUAL
Called pt and notified Dr. Peterson aware and agrees to follow up with PCP.  Patient PCP ordered a urinalysis and will be going in am for testing. Pt will keep our office updated.   
Pt called office. Pt woke up Saturday night with co left ankle and hip pain. Pt states that she was on her feet all day at a . Pt feels the pain has now resolved, and feeling mucha better this morning. Pt denies any swelling or difficulty waling.     Pt also had some urinary frequency over the weekend, but that too has subsided today. Denies fever, chills, or burning. Advised her to contact her PCP for further evaluation. Reminded her if placed on antibiotics to hold Xelajnz until antibiotics completed. Pt voiced understanding.   
3/20/23

## 2025-03-16 LAB — ESTRADIOL ULTRASENSITIVE: <2.5 PG/ML

## 2025-04-07 ENCOUNTER — APPOINTMENT (OUTPATIENT)
Dept: INFUSION THERAPY | Facility: HOSPITAL | Age: 45
End: 2025-04-07

## 2025-04-29 ENCOUNTER — OUTPATIENT (OUTPATIENT)
Dept: OUTPATIENT SERVICES | Facility: HOSPITAL | Age: 45
LOS: 1 days | Discharge: HOME | End: 2025-04-29

## 2025-04-29 DIAGNOSIS — E34.9 ENDOCRINE DISORDER, UNSPECIFIED: ICD-10-CM

## 2025-04-29 DIAGNOSIS — Z98.891 HISTORY OF UTERINE SCAR FROM PREVIOUS SURGERY: Chronic | ICD-10-CM

## 2025-04-29 DIAGNOSIS — Z98.890 OTHER SPECIFIED POSTPROCEDURAL STATES: Chronic | ICD-10-CM

## 2025-05-08 ENCOUNTER — APPOINTMENT (OUTPATIENT)
Dept: INFUSION THERAPY | Facility: HOSPITAL | Age: 45
End: 2025-05-08

## 2025-05-09 DIAGNOSIS — C50.919 MALIGNANT NEOPLASM OF UNSPECIFIED SITE OF UNSPECIFIED FEMALE BREAST: ICD-10-CM

## 2025-05-19 NOTE — H&P PST ADULT - NSANTHOBSERVEDRD_ENT_A_CORE
Rx Refill Note  Requested Prescriptions     Pending Prescriptions Disp Refills    famotidine (PEPCID) 20 MG tablet [Pharmacy Med Name: Famotidine Oral Tablet 20 MG] 180 tablet 3     Sig: TAKE 1 TABLET TWICE DAILY      Last filled:  08/01/24 w/3  Last office visit with prescribing clinician: 10/17/2024      Next office visit with prescribing clinician: 7/18/2025     Marcelina Anderson MA  05/19/25, 13:12 EDT  
No

## 2025-06-02 ENCOUNTER — APPOINTMENT (OUTPATIENT)
Dept: INFUSION THERAPY | Facility: HOSPITAL | Age: 45
End: 2025-06-02

## 2025-06-02 ENCOUNTER — APPOINTMENT (OUTPATIENT)
Dept: HEMATOLOGY ONCOLOGY | Facility: CLINIC | Age: 45
End: 2025-06-02
Payer: COMMERCIAL

## 2025-06-02 VITALS
WEIGHT: 158.73 LBS | DIASTOLIC BLOOD PRESSURE: 65 MMHG | HEIGHT: 64.96 IN | TEMPERATURE: 97.9 F | HEART RATE: 76 BPM | SYSTOLIC BLOOD PRESSURE: 96 MMHG | RESPIRATION RATE: 16 BRPM | BODY MASS INDEX: 26.45 KG/M2 | OXYGEN SATURATION: 97 %

## 2025-06-02 DIAGNOSIS — C50.912 MALIGNANT NEOPLASM OF UNSPECIFIED SITE OF LEFT FEMALE BREAST: ICD-10-CM

## 2025-06-02 DIAGNOSIS — Z79.899 OTHER LONG TERM (CURRENT) DRUG THERAPY: ICD-10-CM

## 2025-06-02 PROCEDURE — 99214 OFFICE O/P EST MOD 30 MIN: CPT

## 2025-06-09 ENCOUNTER — APPOINTMENT (OUTPATIENT)
Dept: HEMATOLOGY ONCOLOGY | Facility: CLINIC | Age: 45
End: 2025-06-09

## 2025-06-26 ENCOUNTER — OUTPATIENT (OUTPATIENT)
Dept: OUTPATIENT SERVICES | Facility: HOSPITAL | Age: 45
LOS: 1 days | Discharge: HOME | End: 2025-06-26

## 2025-06-26 DIAGNOSIS — Z98.890 OTHER SPECIFIED POSTPROCEDURAL STATES: Chronic | ICD-10-CM

## 2025-06-26 DIAGNOSIS — Z98.891 HISTORY OF UTERINE SCAR FROM PREVIOUS SURGERY: Chronic | ICD-10-CM

## 2025-06-26 DIAGNOSIS — E34.9 ENDOCRINE DISORDER, UNSPECIFIED: ICD-10-CM

## 2025-06-30 ENCOUNTER — APPOINTMENT (OUTPATIENT)
Dept: INFUSION THERAPY | Facility: HOSPITAL | Age: 45
End: 2025-06-30

## 2025-07-01 DIAGNOSIS — C50.919 MALIGNANT NEOPLASM OF UNSPECIFIED SITE OF UNSPECIFIED FEMALE BREAST: ICD-10-CM

## 2025-07-07 ENCOUNTER — APPOINTMENT (OUTPATIENT)
Dept: MRI IMAGING | Facility: CLINIC | Age: 45
End: 2025-07-07
Payer: COMMERCIAL

## 2025-07-07 PROCEDURE — 77049 MRI BREAST C-+ W/CAD BI: CPT

## 2025-07-07 PROCEDURE — A9585: CPT | Mod: JZ

## 2025-07-15 ENCOUNTER — APPOINTMENT (OUTPATIENT)
Dept: SURGICAL ONCOLOGY | Facility: CLINIC | Age: 45
End: 2025-07-15

## 2025-07-15 VITALS
SYSTOLIC BLOOD PRESSURE: 128 MMHG | HEART RATE: 93 BPM | DIASTOLIC BLOOD PRESSURE: 87 MMHG | OXYGEN SATURATION: 97 % | WEIGHT: 160 LBS | BODY MASS INDEX: 26.66 KG/M2 | HEIGHT: 64.96 IN

## 2025-07-15 PROCEDURE — 99213 OFFICE O/P EST LOW 20 MIN: CPT

## 2025-07-28 ENCOUNTER — APPOINTMENT (OUTPATIENT)
Dept: INFUSION THERAPY | Facility: HOSPITAL | Age: 45
End: 2025-07-28

## 2025-08-25 ENCOUNTER — APPOINTMENT (OUTPATIENT)
Dept: INFUSION THERAPY | Facility: HOSPITAL | Age: 45
End: 2025-08-25

## 2025-09-08 ENCOUNTER — APPOINTMENT (OUTPATIENT)
Dept: HEMATOLOGY ONCOLOGY | Facility: CLINIC | Age: 45
End: 2025-09-08

## 2025-09-19 ENCOUNTER — RESULT REVIEW (OUTPATIENT)
Age: 45
End: 2025-09-19

## 2025-09-19 ENCOUNTER — APPOINTMENT (OUTPATIENT)
Dept: HEMATOLOGY ONCOLOGY | Facility: CLINIC | Age: 45
End: 2025-09-19

## 2025-09-19 ENCOUNTER — APPOINTMENT (OUTPATIENT)
Dept: INFUSION THERAPY | Facility: HOSPITAL | Age: 45
End: 2025-09-19

## 2025-09-19 VITALS
WEIGHT: 159.84 LBS | DIASTOLIC BLOOD PRESSURE: 76 MMHG | SYSTOLIC BLOOD PRESSURE: 128 MMHG | OXYGEN SATURATION: 99 % | TEMPERATURE: 97.8 F | BODY MASS INDEX: 26.63 KG/M2 | RESPIRATION RATE: 16 BRPM | HEART RATE: 51 BPM

## 2025-09-19 DIAGNOSIS — C50.912 MALIGNANT NEOPLASM OF UNSPECIFIED SITE OF LEFT FEMALE BREAST: ICD-10-CM

## 2025-09-19 DIAGNOSIS — D05.01 LOBULAR CARCINOMA IN SITU OF RIGHT BREAST: ICD-10-CM

## 2025-09-19 DIAGNOSIS — C50.911 MALIGNANT NEOPLASM OF UNSPECIFIED SITE OF RIGHT FEMALE BREAST: ICD-10-CM

## 2025-09-19 DIAGNOSIS — C50.919 MALIGNANT NEOPLASM OF UNSPECIFIED SITE OF UNSPECIFIED FEMALE BREAST: ICD-10-CM

## 2025-09-19 LAB
ALBUMIN SERPL ELPH-MCNC: 4.6 G/DL
ALP BLD-CCNC: 71 U/L
ALT SERPL-CCNC: 15 U/L
ANION GAP SERPL CALC-SCNC: 13 MMOL/L
AST SERPL-CCNC: 13 U/L
BILIRUB SERPL-MCNC: 0.3 MG/DL
BUN SERPL-MCNC: 11 MG/DL
CALCIUM SERPL-MCNC: 9.9 MG/DL
CHLORIDE SERPL-SCNC: 108 MMOL/L
CO2 SERPL-SCNC: 26 MMOL/L
CREAT SERPL-MCNC: 0.74 MG/DL
EGFRCR SERPLBLD CKD-EPI 2021: 104 ML/MIN/1.73M2
GLUCOSE SERPL-MCNC: 71 MG/DL
POTASSIUM SERPL-SCNC: 4.3 MMOL/L
PROT SERPL-MCNC: 7.2 G/DL
SODIUM SERPL-SCNC: 146 MMOL/L

## 2025-09-25 LAB — ESTRADIOL ULTRASENSITIVE: <2.5 PG/ML

## (undated) DEVICE — GLV 7.5 PROTEXIS (WHITE)

## (undated) DEVICE — BLADE SCALPEL SAFETYLOCK #10

## (undated) DEVICE — SUT SOFSILK 2-0 18" C-23

## (undated) DEVICE — GLV 6.5 PROTEXIS W HYDROGEL

## (undated) DEVICE — DRSG STERISTRIPS 0.5 X 4"

## (undated) DEVICE — ELCTR ROCKER SWITCH PENCIL BLUE 10FT

## (undated) DEVICE — DRAPE 1/2 SHEET 40X57"

## (undated) DEVICE — POSITIONER FOAM EGG CRATE ULNAR 2PCS (PINK)

## (undated) DEVICE — DRSG BENZOIN 0.6CC

## (undated) DEVICE — VENODYNE/SCD SLEEVE CALF MEDIUM

## (undated) DEVICE — MARKING PEN W RULER

## (undated) DEVICE — ELCTR BOVIE PENCIL SMOKE EVACUATION

## (undated) DEVICE — WARMING BLANKET LOWER ADULT

## (undated) DEVICE — ELCTR GROUNDING PAD ADULT COVIDIEN

## (undated) DEVICE — BLADE SCALPEL SAFETYLOCK #15

## (undated) DEVICE — SYR SAFE TB 1CC 27G X 0.5

## (undated) DEVICE — DRSG TEGADERM 2.5X3"

## (undated) DEVICE — POSITIONER PATIENT SAFETY STRAP 3X60"

## (undated) DEVICE — DRAPE 3/4 SHEET 52X76"

## (undated) DEVICE — DRAPE COVER SLEEVE GAMMA FINDER III

## (undated) DEVICE — ELCTR BOVIE TIP BLADE INSULATED 2.75" EDGE

## (undated) DEVICE — SUT VICRYL 4-0 27" SH UNDYED

## (undated) DEVICE — DRSG DERMABOND 0.7ML

## (undated) DEVICE — DRSG CURITY GAUZE SPONGE 4 X 4" 12-PLY

## (undated) DEVICE — LIGASURE SMALL JAW

## (undated) DEVICE — FOLEY TRAY 16FR 5CC LTX UMETER CLOSED

## (undated) DEVICE — SUT POLYSORB 2-0 30" V-20 UNDYED

## (undated) DEVICE — DRAPE IOBAN 23" X 17"

## (undated) DEVICE — DRAIN RESERVOIR FOR JACKSON PRATT 100CC CARDINAL

## (undated) DEVICE — SPONGE PEANUT AUTO COUNT

## (undated) DEVICE — SUT MONOCRYL 4-0 27" PS-2 UNDYED

## (undated) DEVICE — SAVI SCOUT HANDPIECE

## (undated) DEVICE — WARMING BLANKET FULL UNDERBODY

## (undated) DEVICE — SUT VICRYL 3-0 27" SH UNDYED

## (undated) DEVICE — ELCTR BOVIE TIP BLADE INSULATED 4" EDGE

## (undated) DEVICE — DRAPE MAYO STAND 30"

## (undated) DEVICE — GLV 8 PROTEXIS (WHITE)

## (undated) DEVICE — DRAPE MAGNETIC INSTRUMENT MEDIUM

## (undated) DEVICE — ELCTR STRYKER NEPTUNE SMOKE EVACUATION PENCIL (GREEN)

## (undated) DEVICE — GAMMA SLEEVE DISPOSABLE

## (undated) DEVICE — DRSG MAMMARY SUPPORT MED SIZE 3

## (undated) DEVICE — DRAPE INSTRUMENT POUCH 6.75" X 11"

## (undated) DEVICE — DRSG MAMMARY SUPPORT SM SIZE 1

## (undated) DEVICE — GOWN LG

## (undated) DEVICE — DRSG OPSITE 2.5 X 2"

## (undated) DEVICE — SOL IRR POUR NS 0.9% 500ML

## (undated) DEVICE — PREP CHLORAPREP HI-LITE ORANGE 26ML

## (undated) DEVICE — DRSG MAMMARY SUPPORT LG SIZE 4

## (undated) DEVICE — SPECIMEN CONTAINER 100ML

## (undated) DEVICE — PACK GENERAL MINOR

## (undated) DEVICE — DRSG OPSITE 13.75 X 4"

## (undated) DEVICE — DRAIN JACKSON PRATT 10MM FLAT FULL NO TROCAR

## (undated) DEVICE — SYR LUER LOK 10CC

## (undated) DEVICE — DRSG MAMMARY SUPPORT XL SIZE 5

## (undated) DEVICE — NDL HYPO SAFE 25G X 1" (ORANGE)

## (undated) DEVICE — DRAPE GENERAL ENDOSCOPY

## (undated) DEVICE — SUT SILK 2-0 30" SH

## (undated) DEVICE — DRAPE CHEST BREAST 106" X 122"

## (undated) DEVICE — MEDICATION LABELS W MARKER

## (undated) DEVICE — SUCTION YANKAUER NO CONTROL VENT

## (undated) DEVICE — POSITIONER STRAP ARMBOARD VELCRO TS-30

## (undated) DEVICE — DRAPE LAPAROTOMY TRANSVERSE

## (undated) DEVICE — PACK MAJOR ABDOMINAL WITH LAP

## (undated) DEVICE — SOL IRR POUR H2O 500ML

## (undated) DEVICE — DURABLE MEDICAL EQUIPMENT: Type: DURABLE MEDICAL EQUIPMENT

## (undated) DEVICE — SUT BIOSYN 4-0 18" P-12

## (undated) DEVICE — PACK MINOR NO DRAPE

## (undated) DEVICE — ONETRAC LIGHTED RETRACTOR 135 X 30MM DISP

## (undated) DEVICE — DRSG MAMMARY SUPPORT MED SIZE 2

## (undated) DEVICE — NDL HYPO SAFE 25G X 1.5" (ORANGE)

## (undated) DEVICE — SYR ASEPTO

## (undated) DEVICE — LAP PAD W RING 18 X 18"

## (undated) DEVICE — SOL IRR POUR H2O 250ML

## (undated) DEVICE — SUT POLYSORB 3-0 30" V-20 UNDYED

## (undated) DEVICE — DRAIN BLAKE 15FR BARD CHANNEL

## (undated) DEVICE — POSITIONER FOAM HEADREST (PINK)

## (undated) DEVICE — DRAPE TOWEL BLUE 17" X 24"

## (undated) DEVICE — RADIOGRAPHY DVC SPEC TRANSPEC

## (undated) DEVICE — STAPLER SKIN VISI-STAT 35 WIDE

## (undated) DEVICE — PREP BETADINE SPONGE STICKS

## (undated) DEVICE — DRSG TEGADERM 4X4.75"

## (undated) DEVICE — DRSG TEGADERM 6"X8"

## (undated) DEVICE — PACK MINOR

## (undated) DEVICE — DRAPE LIGHT HANDLE COVER (BLUE)